# Patient Record
Sex: MALE | Race: WHITE | Employment: FULL TIME | URBAN - METROPOLITAN AREA
[De-identification: names, ages, dates, MRNs, and addresses within clinical notes are randomized per-mention and may not be internally consistent; named-entity substitution may affect disease eponyms.]

---

## 2017-01-25 ENCOUNTER — MYC MEDICAL ADVICE (OUTPATIENT)
Dept: ENDOCRINOLOGY | Facility: CLINIC | Age: 27
End: 2017-01-25

## 2017-01-26 DIAGNOSIS — I10 ESSENTIAL HYPERTENSION, BENIGN: Primary | ICD-10-CM

## 2017-01-26 RX ORDER — LISINOPRIL/HYDROCHLOROTHIAZIDE 10-12.5 MG
1 TABLET ORAL DAILY
Qty: 90 TABLET | Refills: 1 | Status: SHIPPED | OUTPATIENT
Start: 2017-01-26 | End: 2017-11-18

## 2017-01-26 NOTE — TELEPHONE ENCOUNTER
Gonzalez Gibbs is requesting a refill of:    Pending Prescriptions:                       Disp   Refills    lisinopril-hydrochlorothiazide (PRINZIDE/*90 tab*0            Sig: Take 1 tablet by mouth daily    Needs to be sent to mail order now.  Thanks,Jodi

## 2017-01-30 ENCOUNTER — TELEPHONE (OUTPATIENT)
Dept: FAMILY MEDICINE | Facility: CLINIC | Age: 27
End: 2017-01-30

## 2017-01-30 DIAGNOSIS — E10.9 TYPE 1 DIABETES MELLITUS WITHOUT COMPLICATION (H): Primary | ICD-10-CM

## 2017-01-30 RX ORDER — LISINOPRIL 10 MG/1
10 TABLET ORAL DAILY
Qty: 30 TABLET | Refills: 0 | COMMUNITY
Start: 2017-01-30

## 2017-01-30 NOTE — TELEPHONE ENCOUNTER
Patient is due for a medication recheck for the following medication Lisinopril ,and meets the qualifications for a physician approved 30 day extension.    A #30 day refill has [facility] [place]     staff, per the refill protocol can you please call the patient and help assist in setting up a fasting medication recheck.  Please attempt to reach the patient to schedule that appointment, and if you are unable to reach them, please forward back to the prescribing physician.      Telephone Information:   Mobile [phone] (home)       Thank You  LEONEL Kim ([facility])

## 2017-01-30 NOTE — TELEPHONE ENCOUNTER
LANCETS ACCU-CHECK FASTCLX - NEW PHARMACY         Last Written Prescription Date: 12/01/16  Last Fill Quantity: 3 months, # refills: 3  Last Office Visit with [facility], P or [facility] prescribing provider:  12/01/16        BP Readings from Last 3 Encounters:   12/01/16 126/84   11/30/16 120/82   06/13/16 144/82     MICROL        6   6/13/2016  No results found for this basename: microalbumin  CREATININE   Date Value Ref Range Status   11/30/2016 0.78 0.60 - 1.35 mg/dL Final   ]  GFR ESTIMATE   Date Value Ref Range Status   11/30/2016 125 > OR = 60 mL/min/1.73m2 Final   05/31/2015 >90  Non  GFR Calc   >60 mL/min/1.7m2 Final   05/31/2015 >90  Non  GFR Calc   >60 mL/min/1.7m2 Final     GFR ESTIMATE IF BLACK   Date Value Ref Range Status   05/31/2015 >90   GFR Calc   >60 mL/min/1.7m2 Final   05/31/2015 >90   GFR Calc   >60 mL/min/1.7m2 Final   01/24/2014 >90 >60 mL/min/1.7m2 Final     CHOL      222   12/1/2016  HDL       43   12/1/2016  LDL      137   12/1/2016  TRIG      211   12/1/2016  CHOLHDLRATIO      4.0   5/3/2012  AST       49   5/31/2015  ALT       98   5/31/2015  A1C      8.5   12/1/2016  A1C      7.8   6/13/2016  A1C      8.1   4/7/2016  A1C      6.9   11/27/2012  A1C      7.1   8/7/2012  POTASSIUM   Date Value Ref Range Status   11/30/2016 4.5 3.5 - 5.3 mmol/L Final

## 2017-01-31 ENCOUNTER — TELEPHONE (OUTPATIENT)
Dept: FAMILY MEDICINE | Facility: CLINIC | Age: 27
End: 2017-01-31

## 2017-01-31 RX ORDER — LANCETS
EACH MISCELLANEOUS
Qty: 400 BOX | Refills: 3 | Status: SHIPPED | OUTPATIENT
Start: 2017-01-31 | End: 2020-02-04

## 2017-01-31 NOTE — TELEPHONE ENCOUNTER
[facility] called to see if we would give a verbal for refills for Pt. [facility]old [facility] that Pt needed an O.V and that [facility] [place].   Kamini.LEONEL Friedman ([facility])

## 2017-01-31 NOTE — TELEPHONE ENCOUNTER
[facility] calls asking for specific orders for diabetic testing supplies and order for Metformin.  All were reordered to go to [facility] instead of local pharmacy.

## 2017-02-01 ENCOUNTER — OFFICE VISIT (OUTPATIENT)
Dept: FAMILY MEDICINE | Facility: CLINIC | Age: 27
End: 2017-02-01

## 2017-02-01 VITALS
OXYGEN SATURATION: 97 % | TEMPERATURE: 97.8 F | HEART RATE: 87 BPM | SYSTOLIC BLOOD PRESSURE: 134 MMHG | DIASTOLIC BLOOD PRESSURE: 86 MMHG | BODY MASS INDEX: 42.57 KG/M2 | WEIGHT: 288.4 LBS

## 2017-02-01 DIAGNOSIS — K76.0 FATTY LIVER DISEASE, NONALCOHOLIC: ICD-10-CM

## 2017-02-01 DIAGNOSIS — I10 ESSENTIAL HYPERTENSION: Primary | ICD-10-CM

## 2017-02-01 PROCEDURE — 84450 TRANSFERASE (AST) (SGOT): CPT | Mod: 90 | Performed by: FAMILY MEDICINE

## 2017-02-01 PROCEDURE — 99212 OFFICE O/P EST SF 10 MIN: CPT | Performed by: FAMILY MEDICINE

## 2017-02-01 PROCEDURE — 36415 COLL VENOUS BLD VENIPUNCTURE: CPT | Performed by: FAMILY MEDICINE

## 2017-02-01 PROCEDURE — 80048 BASIC METABOLIC PNL TOTAL CA: CPT | Mod: 90 | Performed by: FAMILY MEDICINE

## 2017-02-01 PROCEDURE — 84460 ALANINE AMINO (ALT) (SGPT): CPT | Mod: 90 | Performed by: FAMILY MEDICINE

## 2017-02-01 NOTE — NURSING NOTE
Luilnace is here for a recheck on medication.     Pre-Visit Screening :  Immunizations : up to date  Colon Screening : na  Asthma Action Test/Plan : Pt states he doesn't have Asthma anymore  PHQ9/GAD7 :  NA    BP done on the left arm, with a large sized cuff.  Pulse - regular  My Chart - accepts    CLASSIFICATION OF OVERWEIGHT AND OBESITY BY BMI                         Obesity Class           BMI(kg/m2)  Underweight                                    < 18.5  Normal                                         18.5-24.9  Overweight                                     25.0-29.9  OBESITY                     I                  30.0-34.9                              II                 35.0-39.9  EXTREME OBESITY             III                >40                             Patient's  BMI Body mass index is 42.57 kg/(m^2).  http://hin.nhlbi.nih.gov/menuplanner/menu.cgi  Questioned patient about current smoking habits.  Pt. has never smoked.  LEONEL Kim ([facility])

## 2017-02-01 NOTE — PROGRESS NOTES
SUBJECTIVE:  26 year old male, presents for refill of his lisinopril/ hctz for treatment of his hypertension  Medications initiated in November-  /84 on 12/1 at the time of his diabetis check    He is under more stress this week  Quit his current job- looking for another- just left a job interview before coming to the office today  He also does not think he took his blood pressure pill today    Past medical history significant for Juvenile diabetis: insulin pump since age 15  Under the care of Dr Katz- Hemoglobin A1C 8.5 on 12/1/2-16    Patient Active Problem List   Diagnosis     Family history of diabetes mellitus     Mild intermittent asthma     Fatty liver disease, nonalcoholic     Essential hypertension     Nonspecific abnormal results of liver function study/ monitor ALT     Health Care Home     Type I diabetes mellitus, uncontrolled (H)     Past Surgical History   Procedure Laterality Date     Hc create eardrum opening,gen anesth       P.E. Tubes/ 4-5 surgeries, annual from age 10)     Hc repair ing hernia,6mo-5yr,reduc  1992     also umbilical hernia     Family History   Problem Relation Age of Onset     CANCER No family hx of      HEART DISEASE No family hx of      DIABETES Paternal Uncle      DIABETES Other      2nd cousins     Hypertension Father      Depression Mother      OBJECTIVE:  /94 mmHg  Pulse 87  Temp(Src) 97.8  F (36.6  C) (Oral)  Wt 130.817 kg (288 lb 6.4 oz)  SpO2 97%  No acute distress  Recheck blood pressure:134/86   Regular rate and  rhythm. S1 and S2 normal, no murmurs, clicks, gallops or rubs. No edema or JVD. Chest is clear; no wheezes or rales.    Assessment  Hypertension, improved  History of elevated LFT's/ fatty liver- monitor    PLAN:  Continue monitoring  Lisinopril/ hctz refilled at his current dose  Recheck in six months

## 2017-02-02 LAB
ALT SERPL-CCNC: 95 U/L (ref 9–46)
AST SERPL-CCNC: 50 U/L (ref 10–40)
BUN SERPL-MCNC: 14 MG/DL (ref 7–25)
BUN/CREATININE RATIO: ABNORMAL (CALC) (ref 6–22)
CALCIUM SERPL-MCNC: 10.2 MG/DL (ref 8.6–10.3)
CHLORIDE SERPLBLD-SCNC: 100 MMOL/L (ref 98–110)
CO2 SERPL-SCNC: 21 MMOL/L (ref 20–31)
CREAT SERPL-MCNC: 0.72 MG/DL (ref 0.6–1.35)
EGFR AFRICAN AMERICAN - QUEST: 150 ML/MIN/1.73M2
GFR SERPL CREATININE-BSD FRML MDRD: 130 ML/MIN/1.73M2
GLUCOSE - QUEST: 141 MG/DL (ref 65–99)
POTASSIUM SERPL-SCNC: 4.5 MMOL/L (ref 3.5–5.3)
SODIUM SERPL-SCNC: 136 MMOL/L (ref 135–146)

## 2017-02-06 ENCOUNTER — MYC MEDICAL ADVICE (OUTPATIENT)
Dept: ENDOCRINOLOGY | Facility: CLINIC | Age: 27
End: 2017-02-06

## 2017-02-06 DIAGNOSIS — E10.9 TYPE 1 DIABETES MELLITUS WITHOUT COMPLICATION (H): Primary | ICD-10-CM

## 2017-03-29 NOTE — TELEPHONE ENCOUNTER
Humalog - Change of Pharmacy         Last Written Prescription Date: 02/06/17  Last Fill Quantity: 30 mL, # refills: 1  Last Office Visit with G, P or [facility] prescribing provider:  12/01/16        BP Readings from Last 3 Encounters:   02/01/17 134/86   12/01/16 126/84   11/30/16 120/82     Lab Results   Component Value Date    MICROL 6 06/13/2016     No results found for: MICROALBUMIN  Creatinine   Date Value Ref Range Status   02/01/2017 0.72 0.60 - 1.35 mg/dL Final   ]  GFR Estimate   Date Value Ref Range Status   02/01/2017 130 > OR = 60 mL/min/1.73m2 Final   11/30/2016 125 > OR = 60 mL/min/1.73m2 Final   05/31/2015 >90  Non  GFR Calc   >60 mL/min/1.7m2 Final     GFR Estimate If Black   Date Value Ref Range Status   05/31/2015 >90   GFR Calc   >60 mL/min/1.7m2 Final   05/31/2015 >90   GFR Calc   >60 mL/min/1.7m2 Final   01/24/2014 >90 >60 mL/min/1.7m2 Final     Lab Results   Component Value Date    CHOL 222 12/01/2016     Lab Results   Component Value Date    HDL 43 12/01/2016     Lab Results   Component Value Date     12/01/2016     Lab Results   Component Value Date    TRIG 211 12/01/2016     Lab Results   Component Value Date    CHOLHDLRATIO 4.0 05/03/2012     Lab Results   Component Value Date    AST 50 02/01/2017     Lab Results   Component Value Date    ALT 95 02/01/2017     Lab Results   Component Value Date    A1C 8.5 12/01/2016    A1C 7.8 06/13/2016    A1C 8.1 04/07/2016    A1C 6.9 11/27/2012    A1C 7.1 08/07/2012     Potassium   Date Value Ref Range Status   02/01/2017 4.5 3.5 - 5.3 mmol/L Final       Labs showing if normal/abnormal  Lab Results   Component Value Date    UCRR 51 06/13/2016    MICROL 6 06/13/2016     Lab Results   Component Value Date    CHOL 222 (H) 12/01/2016    TRIG 211 (H) 12/01/2016    HDL 43 12/01/2016     (H) 12/01/2016    VLDL 29 05/03/2012    CHOLHDLRATIO 4.0 05/03/2012     Lab Results   Component Value Date     A1C 8.5 (H) 12/01/2016    A1C 7.8 (H) 06/13/2016    A1C 8.1 (A) 04/07/2016

## 2017-04-13 ENCOUNTER — TELEPHONE (OUTPATIENT)
Dept: ENDOCRINOLOGY | Facility: CLINIC | Age: 27
End: 2017-04-13

## 2017-04-13 NOTE — TELEPHONE ENCOUNTER
Panel Management Review      Patient has the following on his problem list:     Diabetes    ASA: Not Required     Last A1C  Lab Results   Component Value Date    A1C 8.5 12/01/2016    A1C 7.8 06/13/2016    A1C 8.1 04/07/2016    A1C 6.9 11/27/2012    A1C 7.1 08/07/2012     A1C tested: FAILED    Last LDL:    Lab Results   Component Value Date    CHOL 222 12/01/2016     Lab Results   Component Value Date    HDL 43 12/01/2016     Lab Results   Component Value Date     12/01/2016     Lab Results   Component Value Date    TRIG 211 12/01/2016     Lab Results   Component Value Date    CHOLHDLRATIO 4.0 05/03/2012     Lab Results   Component Value Date    NHDL 179 12/01/2016       Is the patient on a Statin? NO             Is the patient on Aspirin? NO        Last three blood pressure readings:  BP Readings from Last 3 Encounters:   02/01/17 134/86   12/01/16 126/84   11/30/16 120/82       Date of last diabetes office visit: 12/1/16     Tobacco History:     History   Smoking Status     Never Smoker   Smokeless Tobacco     Never Used           Composite cancer screening  Chart review shows that this patient is due/due soon for the following None  Summary:    Patient is due/failing the following:   FOLLOW UP    Action needed:   Patient needs office visit for dm.    Type of outreach:    none pt has appointment scheduled     Questions for provider review:    None                                                                                                                                    Ada Craig CMA       Chart routed to closed.

## 2017-04-25 ENCOUNTER — MYC MEDICAL ADVICE (OUTPATIENT)
Dept: ENDOCRINOLOGY | Facility: CLINIC | Age: 27
End: 2017-04-25

## 2017-05-02 ENCOUNTER — MYC MEDICAL ADVICE (OUTPATIENT)
Dept: ENDOCRINOLOGY | Facility: CLINIC | Age: 27
End: 2017-05-02

## 2017-06-02 ENCOUNTER — MYC MEDICAL ADVICE (OUTPATIENT)
Dept: FAMILY MEDICINE | Facility: CLINIC | Age: 27
End: 2017-06-02

## 2017-06-05 ENCOUNTER — MYC MEDICAL ADVICE (OUTPATIENT)
Dept: FAMILY MEDICINE | Facility: CLINIC | Age: 27
End: 2017-06-05

## 2017-06-05 NOTE — TELEPHONE ENCOUNTER
Yue-  Can you respond    Type 1 diabetic- needs endocrinologist to manage diabetis    Can you make a recommendation- I can order referral  [facility] [place]- can you call patient?

## 2017-06-05 NOTE — TELEPHONE ENCOUNTER
From: Gonzalez Gibbs  To: Jacey Mon MD  Sent: 6/5/2017 10:15 AM CDT  Subject: Referral    Hey Dr. Mon and team,    So there has been a lot of changes in the past few months. I got a new job and have lost two medical insurances and have finally landed on my new jobs medical insurance. Anyways with this new job, [place] cities and am wondering if I would be able to get a referral to a new Endocrinologist closer to my place of work in [place] [facility]. Please let me know what you guys need from me and if you have any questions.     Thank you,  Enrique

## 2017-06-07 NOTE — TELEPHONE ENCOUNTER
Dr. Mon I talked to patient this morning RE the referral for Endo. [facility]t.     Please sign the order so I can fax    Thank you

## 2017-06-09 ENCOUNTER — MYC MEDICAL ADVICE (OUTPATIENT)
Dept: FAMILY MEDICINE | Facility: CLINIC | Age: 27
End: 2017-06-09

## 2017-06-10 NOTE — TELEPHONE ENCOUNTER
From: Gonzalez Gibbs  To: Jacey Mon MD  Sent: 6/9/2017 8:22 PM CDT  Subject: Novolog    Hey Dr. Mon! Thanks for the referral! I have an appointment this upcoming Thursday. I was just wondering if there was a way to get a prescription for one bottle of Novolog to tide me until the appointment. I have half a bottle left and it will be close call, me having enough to last till Thursday. (Don't have any refills left after delaying  Till after I regained insurance) Please let me know and have a fantastic weekend thank you guys so much!

## 2017-06-14 ENCOUNTER — TELEPHONE (OUTPATIENT)
Dept: FAMILY MEDICINE | Facility: CLINIC | Age: 27
End: 2017-06-14

## 2017-06-14 NOTE — TELEPHONE ENCOUNTER
Received a Prior Authorization for Humalog for the Pt.   Called and started the PA and Pt has received a 1 year Authorization for the medication.   From 6/14/17- 6/14/18.   [phone]    Called Pt and left a message that his prescription is available.   Called Pharmacy and let them know that the PA went through.     LEONEL Kim ([facility])

## 2017-06-15 LAB
ALBUMIN URINE MG/G CR: 5 MG/G CREATININE
ALBUMIN URINE MG/SPEC: 0.5
CHOL/HDLC RATIO - QUEST: 3.8
CHOLEST SERPL-MCNC: 175 MG/DL (ref 125–200)
CREATININE URINE: 101 (ref 20–370)
GLUCOSE SERPL-MCNC: 237 MG/DL (ref 70–99)
HBA1C MFR BLD: 8 % (ref 4–6)
HDLC SERPL-MCNC: 46 MG/DL
LDLC SERPL CALC-MCNC: 104 MG/DL
NONHDLC SERPL-MCNC: 129 MG/DL
TRIGL SERPL-MCNC: 127 MG/DL
TSH SERPL-ACNC: 1.67 MCU/ML (ref 0.3–5)

## 2017-06-26 ENCOUNTER — TRANSFERRED RECORDS (OUTPATIENT)
Dept: FAMILY MEDICINE | Facility: CLINIC | Age: 27
End: 2017-06-26

## 2017-10-12 DIAGNOSIS — I10 ESSENTIAL HYPERTENSION, BENIGN: ICD-10-CM

## 2017-10-12 RX ORDER — LISINOPRIL/HYDROCHLOROTHIAZIDE 10-12.5 MG
TABLET ORAL
Qty: 30 TABLET | Refills: 0 | OUTPATIENT
Start: 2017-10-12

## 2017-11-18 ENCOUNTER — OFFICE VISIT (OUTPATIENT)
Dept: FAMILY MEDICINE | Facility: CLINIC | Age: 27
End: 2017-11-18

## 2017-11-18 VITALS
TEMPERATURE: 99.3 F | WEIGHT: 278.4 LBS | SYSTOLIC BLOOD PRESSURE: 129 MMHG | BODY MASS INDEX: 41.23 KG/M2 | OXYGEN SATURATION: 97 % | HEART RATE: 91 BPM | DIASTOLIC BLOOD PRESSURE: 84 MMHG | HEIGHT: 69 IN

## 2017-11-18 DIAGNOSIS — Z23 NEED FOR VACCINATION: ICD-10-CM

## 2017-11-18 DIAGNOSIS — I10 ESSENTIAL HYPERTENSION: Primary | ICD-10-CM

## 2017-11-18 DIAGNOSIS — E66.01 MORBID OBESITY (H): ICD-10-CM

## 2017-11-18 DIAGNOSIS — Z71.89 ACP (ADVANCE CARE PLANNING): ICD-10-CM

## 2017-11-18 PROCEDURE — 84132 ASSAY OF SERUM POTASSIUM: CPT | Mod: 90 | Performed by: FAMILY MEDICINE

## 2017-11-18 PROCEDURE — 82565 ASSAY OF CREATININE: CPT | Mod: 90 | Performed by: FAMILY MEDICINE

## 2017-11-18 PROCEDURE — 90471 IMMUNIZATION ADMIN: CPT | Performed by: FAMILY MEDICINE

## 2017-11-18 PROCEDURE — 90472 IMMUNIZATION ADMIN EACH ADD: CPT | Performed by: FAMILY MEDICINE

## 2017-11-18 PROCEDURE — 99213 OFFICE O/P EST LOW 20 MIN: CPT | Mod: 25 | Performed by: FAMILY MEDICINE

## 2017-11-18 PROCEDURE — 90732 PPSV23 VACC 2 YRS+ SUBQ/IM: CPT | Performed by: FAMILY MEDICINE

## 2017-11-18 PROCEDURE — 36415 COLL VENOUS BLD VENIPUNCTURE: CPT | Performed by: FAMILY MEDICINE

## 2017-11-18 PROCEDURE — 90686 IIV4 VACC NO PRSV 0.5 ML IM: CPT | Performed by: FAMILY MEDICINE

## 2017-11-18 RX ORDER — LISINOPRIL/HYDROCHLOROTHIAZIDE 10-12.5 MG
1 TABLET ORAL DAILY
Qty: 90 TABLET | Refills: 3 | Status: SHIPPED | OUTPATIENT
Start: 2017-11-18 | End: 2018-11-26

## 2017-11-18 NOTE — MR AVS SNAPSHOT
After Visit Summary   11/18/2017    Gonzalez Gibbs    MRN: [id number]           Patient Information     Date Of Birth          1990        Visit Information        Provider Department      11/18/2017 9:45 AM Jacey Mon MD [facility]        Today's Diagnoses     Essential hypertension    -  1    Uncontrolled type 1 diabetes mellitus without complication (H)        Morbid obesity (H)        ACP (advance care planning)        Need for vaccination           Follow-ups after your visit        Who to contact     If you have questions or need follow up information about today's clinic visit or your schedule please contact [facility] directly at [phone].  Normal or non-critical lab and imaging results will be communicated to you by Ghostruckhart, letter or phone within 4 business days after the clinic has received the results. If you do not hear from us within 7 days, please contact the clinic through Ghostruckhart or phone. If you have a critical or abnormal lab result, we will notify you by phone as soon as possible.  Submit refill requests through [employer] or call your pharmacy and they will forward the refill request to us. Please allow 3 business days for your refill to be completed.          Additional Information About Your Visit        MyChart Information     [employer] gives you secure access to your electronic health record. If you see a primary care provider, you can also send messages to your care team and make appointments. If you have questions, please call your primary care clinic.  If you do not have a primary care provider, please call [phone] and they will assist you.        Care EveryWhere ID     This is your Care EveryWhere ID. This could be used by other organizations to access your [facility] medical records  [id number]        Your Vitals Were     Pulse Temperature Height Pulse Oximetry BMI (Body Mass Index)       91 99.3  F  "(37.4  C) (Oral) 1.746 m (5' 8.75\") 97% 41.41 kg/m2        Blood Pressure from Last 3 Encounters:   11/18/17 129/84   02/01/17 134/86   12/01/16 126/84    Weight from Last 3 Encounters:   11/18/17 126.3 kg (278 lb 6.4 oz)   02/01/17 130.8 kg (288 lb 6.4 oz)   12/01/16 130 kg (286 lb 11.2 oz)              We Performed the Following     CREATIINE (QUEST)     HC FLU VAC PRESRV FREE QUAD SPLIT VIR 3+YRS IM     PNEUMOCOCCAL VACCINE,ADULT,SQ OR IM     POTASSIUM (QUEST)     VACCINE ADMINISTRATION, EACH ADDITIONAL     VACCINE ADMINISTRATION, INITIAL     VENOUS COLLECTION          Today's Medication Changes          These changes are accurate as of: 11/18/17 11:59 PM.  If you have any questions, ask your nurse or doctor.               These medicines have changed or have updated prescriptions.        Dose/Directions    lisinopril-hydrochlorothiazide 10-12.5 MG per tablet   Commonly known as:  PRINZIDE/ZESTORETIC   This may have changed:  See the new instructions.   Used for:  Essential hypertension, Uncontrolled type 1 diabetes mellitus without complication (H)   Changed by:  Jacey Mon MD        Dose:  1 tablet   Take 1 tablet by mouth daily   Quantity:  90 tablet   Refills:  3            Where to get your medicines      These medications were sent to [id number] - [facility] [address]  [address], [facility] [address]     Phone:  [phone]     lisinopril-hydrochlorothiazide 10-12.5 MG per tablet                Primary Care Provider Office Phone # Fax #    Jacey Mon [phone] [address] [address]        Equal Access to Services     [facility]: Ct [address], wajuanada lulily, qaybta kaalmada juan, heide cardoza. So [facility] [phone].    ATENCIÓN: Si habla español, tiene a noonan disposición servicios gratuitos de asistencia " "portia Nayan al [phone].    We comply with applicable federal civil rights laws and [place] laws. We do not discriminate on the basis of race, color, national origin, age, disability, sex, sexual orientation, or gender identity.            Thank you!     Thank you for choosing [facility]  for your care. Our goal is always to provide you with excellent care. Hearing back from our patients is one way we can continue to improve our services. Please take a few minutes to complete the written survey that you may receive in the mail after your visit with us. Thank you!             Your Updated Medication List - Protect others around you: Learn how to safely use, store and throw away your medicines at www.disposemymeds.org.          This list is accurate as of: 11/18/17 11:59 PM.  Always use your most recent med list.                   Brand Name Dispense Instructions for use Diagnosis    blood glucose monitoring lancets     400 Box    Use to test blood sugar 4 times daily or as directed.    Type 1 diabetes mellitus without complication (H)       insulin lispro 100 UNIT/ML injection    humaLOG    10 mL    Sig: Patient uses up to 140 Units/day with insulin pump    Uncontrolled type 1 diabetes mellitus without complication (H)       Insulin Syringe 29G X 1/2\" 1 ML Misc    BD insulin syringe    50 each    Use one syringe 4 daily or as directed. In case of pump stops working    Uncontrolled type 1 diabetes mellitus without complication (H)       lisinopril-hydrochlorothiazide 10-12.5 MG per tablet    PRINZIDE/ZESTORETIC    90 tablet    Take 1 tablet by mouth daily    Essential hypertension, Uncontrolled type 1 diabetes mellitus without complication (H)       metFORMIN 1000 MG tablet    GLUCOPHAGE    120 tablet    Take 1 tablet (1,000 mg) by mouth 2 times daily (with meals)    Type 1 diabetes mellitus without complication (H)       order for DME     3 Month    All diabetic supplies including " test strips, lancets and solutions for testing for 3 months. Please dispense glucometer compatible supplies- Accucehck Franci. Patient needs to check BG 4 times a day.    Uncontrolled type 1 diabetes mellitus without complication (H)

## 2017-11-18 NOTE — PROGRESS NOTES
SUBJECTIVE:  27 year old male with type 1 diabetis, presents to recheck his blood pressure and refill of his lisinopril    Diabetis managed by Dr Katz       SUBJECTIVE:   Gonzalez Gibbs is a 27 year old male who presents to clinic today for the following health issues:      Hypertension Follow-up      Outpatient blood pressures are not being checked.    He reports normal blood pressure at endocrinologist    Low Salt Diet: avoid processed and fast food      Amount of exercise or physical activity: going to gym three times a week    Problems taking medications regularly: No    Medication side effects: none    Diet: Less fast foods/ less fast foods      Problem list and histories reviewed & adjusted, as indicated.  Additional history: as documented    Patient Active Problem List   Diagnosis     Family history of diabetes mellitus     Mild intermittent asthma     Fatty liver disease, nonalcoholic     Essential hypertension     Nonspecific abnormal results of liver function study/ monitor ALT     Health Care Home     Type I diabetes mellitus, uncontrolled (H)     ACP (advance care planning)     Past Surgical History:   Procedure Laterality Date     HC CREATE EARDRUM OPENING,GEN ANESTH      P.E. Tubes/ 4-5 surgeries, annual from age 10)     HC REPAIR ING HERNIA,6MO-5YR,REDUC  1992    also umbilical hernia       Social History   Substance Use Topics     Smoking status: Never Smoker     Smokeless tobacco: Never Used     Alcohol use 0.0 oz/week     0 Standard drinks or equivalent per week     Family History   Problem Relation Age of Onset     CANCER No family hx of      HEART DISEASE No family hx of      DIABETES Paternal Uncle      DIABETES Other      2nd cousins     Hypertension Father      Depression Mother          Current Outpatient Prescriptions   Medication Sig Dispense Refill     lisinopril-hydrochlorothiazide (PRINZIDE/ZESTORETIC) 10-12.5 MG per tablet Take 1 tablet by mouth daily 90 tablet 3     insulin  "lispro (HUMALOG) 100 UNIT/ML injection Sig: Patient uses up to 140 Units/day with insulin pump 10 mL 0     metFORMIN (GLUCOPHAGE) 1000 MG tablet Take 1 tablet (1,000 mg) by mouth 2 times daily (with meals) 120 tablet 3     blood glucose monitoring (ACCU-CHEK FASTCLIX) lancets Use to test blood sugar 4 times daily or as directed. 400 Box 3     order for DME All diabetic supplies including test strips, lancets and solutions for testing for 3 months.  Please dispense glucometer compatible supplies- Accucehck Franci. Patient needs to check BG 4 times a day. 3 Month 3     Insulin Syringe (BD INSULIN SYRINGE) 29G X 1/2\" 1 ML MISC Use one syringe 4 daily or as directed. In case of pump stops working 50 each prn         Reviewed and updated as needed this visit by clinical staffTobacco  Allergies  Meds  Problems       Reviewed and updated as needed this visit by Provider         ROS:  C: NEGATIVE for fever, chills, change in weight  E/M: NEGATIVE for ear, mouth and throat problems  R: NEGATIVE for significant cough or SOB  CV: NEGATIVE for chest pain, palpitations or peripheral edema    OBJECTIVE:     /84 (BP Location: Left arm, Patient Position: Chair, Cuff Size: Adult Large)  Pulse 91  Temp 99.3  F (37.4  C) (Oral)  Ht 1.746 m (5' 8.75\")  Wt 126.3 kg (278 lb 6.4 oz)  SpO2 97%  BMI 41.41 kg/m2  Body mass index is 41.41 kg/(m^2).   Regular rate and  rhythm. S1 and S2 normal, no murmurs, clicks, gallops or rubs. No edema or JVD. Chest is clear; no wheezes or rales.      Diagnostic Test Results:  Results for orders placed or performed in visit on 11/18/17   CREATIINE (QUEST)   Result Value Ref Range    Creatinine 0.72 0.60 - 1.35 mg/dL    GFR Estimate 129 > OR = 60 mL/min/1.73m2    EGFR African American 149 > OR = 60 mL/min/1.73m2   POTASSIUM (QUEST)   Result Value Ref Range    Potassium 4.6 3.5 - 5.3 mmol/L       ASSESSMENT/PLAN:     Problem List Items Addressed This Visit     Type I diabetes mellitus, " "uncontrolled (H)    Relevant Medications    lisinopril-hydrochlorothiazide (PRINZIDE/ZESTORETIC) 10-12.5 MG per tablet    Other Relevant Orders    CREATIINE (QUEST) (Completed)    POTASSIUM (QUEST) (Completed)    VENOUS COLLECTION (Completed)    PNEUMOCOCCAL VACCINE,ADULT,SQ OR IM (Completed)    Essential hypertension - Primary    Relevant Medications    lisinopril-hydrochlorothiazide (PRINZIDE/ZESTORETIC) 10-12.5 MG per tablet    Other Relevant Orders    CREATIINE (QUEST) (Completed)    POTASSIUM (QUEST) (Completed)    VENOUS COLLECTION (Completed)    ACP (advance care planning)      Other Visit Diagnoses     Morbid obesity (H)        Need for vaccination        Relevant Orders    HC FLU VAC PRESRV FREE QUAD SPLIT VIR 3+YRS IM (Completed)    VACCINE ADMINISTRATION, INITIAL (Completed)    VACCINE ADMINISTRATION, EACH ADDITIONAL (Completed)    PNEUMOCOCCAL VACCINE,ADULT,SQ OR IM (Completed)           BMI:   Estimated body mass index is 41.41 kg/(m^2) as calculated from the following:    Height as of this encounter: 1.746 m (5' 8.75\").    Weight as of this encounter: 126.3 kg (278 lb 6.4 oz).   Weight management plan: Discussed healthy diet and exercise guidelines and patient will follow up in 12 months in clinic to re-evaluate.      MEDICATIONS:  Continue current medications without change      FUTURE APPOINTMENTS:       - Follow-up visit in one year/ - will see in the fall for recheck of blood pressure, refill lisinopril, flu shot    Continue regular diabetis care with endocrinologist  Jacey Mon MD  [facility]  "

## 2017-11-18 NOTE — NURSING NOTE
Luilance ANTONIO Sai is here today for a fasting medication recheck.    Pre-Visit Screening :  Immunizations : not up to date - Pneumococcal 23 today b/c pt is high risk due to diabetes, also flu shot today  Asthma Action Plan/Test : completed today  PHQ9/GAD7 : NA    Pulse - regular  My Chart - accepts    CLASSIFICATION OF OVERWEIGHT AND OBESITY BY BMI                         Obesity Class           BMI(kg/m2)  Underweight                                    < 18.5  Normal                                         18.5-24.9  Overweight                                     25.0-29.9  OBESITY                     I                  30.0-34.9                              II                 35.0-39.9  EXTREME OBESITY             III                >40                             Patient's  BMI Body mass index is 41.41 kg/(m^2).  http://hin.nhlbi.nih.gov/menuplanner/menu.cgi  Questioned patient about current smoking habits.  Pt. has never smoked.  The patient has verbalized that it is ok to leave a detailed voice message on the patient's cell phone with results/recommendations from this visit.     Bethany Carvajal, CMA

## 2017-11-19 LAB
CREAT SERPL-MCNC: 0.72 MG/DL (ref 0.6–1.35)
EGFR AFRICAN AMERICAN - QUEST: 149 ML/MIN/1.73M2
GFR SERPL CREATININE-BSD FRML MDRD: 129 ML/MIN/1.73M2
POTASSIUM SERPL-SCNC: 4.6 MMOL/L (ref 3.5–5.3)

## 2017-11-19 ASSESSMENT — ASTHMA QUESTIONNAIRES: ACT_TOTALSCORE: 25

## 2017-12-12 ENCOUNTER — TELEPHONE (OUTPATIENT)
Dept: ENDOCRINOLOGY | Facility: CLINIC | Age: 27
End: 2017-12-12

## 2017-12-12 NOTE — TELEPHONE ENCOUNTER
Fax received from [facility] for review and signature.  Put in Dr. Katz's in basket.

## 2017-12-13 ENCOUNTER — MEDICAL CORRESPONDENCE (OUTPATIENT)
Dept: HEALTH INFORMATION MANAGEMENT | Facility: CLINIC | Age: 27
End: 2017-12-13

## 2017-12-13 NOTE — TELEPHONE ENCOUNTER
Forms/paperwork reviewed, completed and signed.  Please fax or send the papers as requested, document in chart and close the encounter.    Thank you.    Bridgette Katz

## 2017-12-14 ENCOUNTER — TELEPHONE (OUTPATIENT)
Dept: ENDOCRINOLOGY | Facility: CLINIC | Age: 27
End: 2017-12-14

## 2017-12-14 NOTE — TELEPHONE ENCOUNTER
Panel Management Review      Patient has the following on his problem list:     Diabetes    ASA: Not Required     Last A1C  Lab Results   Component Value Date    A1C 8.0 06/15/2017    A1C 8.5 12/01/2016    A1C 7.8 06/13/2016    A1C 8.1 04/07/2016    A1C 6.9 11/27/2012     A1C tested: FAILED    Last LDL:    Lab Results   Component Value Date    CHOL 175 06/15/2017     Lab Results   Component Value Date    HDL 46 06/15/2017     Lab Results   Component Value Date     06/15/2017     Lab Results   Component Value Date    TRIG 127 06/15/2017     Lab Results   Component Value Date    CHOLHDLRATIO 3.8 06/15/2017    CHOLHDLRATIO 4.0 05/03/2012     Lab Results   Component Value Date    NHDL 129 06/15/2017       Is the patient on a Statin? NO             Is the patient on Aspirin? NO        Last three blood pressure readings:  BP Readings from Last 3 Encounters:   11/18/17 129/84   02/01/17 134/86   12/01/16 126/84       Date of last diabetes office visit: 12/1/16     Tobacco History:     History   Smoking Status     Never Smoker   Smokeless Tobacco     Never Used             Composite cancer screening  Chart review shows that this patient is due/due soon for the following None  Summary:    Patient is due/failing the following:   FOLLOW UP    Action needed:   Patient needs office visit for dm.    Type of outreach:    Sent Testt message.    Questions for provider review:    None                                                                                                                                    Ada Craig CMA ([facility])       Chart routed to closed  .

## 2018-01-24 ENCOUNTER — OFFICE VISIT (OUTPATIENT)
Dept: FAMILY MEDICINE | Facility: CLINIC | Age: 28
End: 2018-01-24

## 2018-01-24 VITALS
WEIGHT: 269 LBS | HEIGHT: 70 IN | OXYGEN SATURATION: 97 % | TEMPERATURE: 98.4 F | DIASTOLIC BLOOD PRESSURE: 88 MMHG | RESPIRATION RATE: 16 BRPM | BODY MASS INDEX: 38.51 KG/M2 | HEART RATE: 112 BPM | SYSTOLIC BLOOD PRESSURE: 122 MMHG

## 2018-01-24 DIAGNOSIS — J10.1 INFLUENZA A: Primary | ICD-10-CM

## 2018-01-24 DIAGNOSIS — R68.83 CHILLS: ICD-10-CM

## 2018-01-24 PROBLEM — E66.01 MORBID OBESITY (H): Status: ACTIVE | Noted: 2018-01-24

## 2018-01-24 LAB
FLUAV AG UPPER RESP QL IA.RAPID: ABNORMAL
FLUBV AG UPPER RESP QL IA.RAPID: ABNORMAL

## 2018-01-24 PROCEDURE — 87804 INFLUENZA ASSAY W/OPTIC: CPT | Performed by: FAMILY MEDICINE

## 2018-01-24 PROCEDURE — 99213 OFFICE O/P EST LOW 20 MIN: CPT | Performed by: FAMILY MEDICINE

## 2018-01-24 RX ORDER — GUAIFENESIN 600 MG/1
600 TABLET, EXTENDED RELEASE ORAL 2 TIMES DAILY PRN
Qty: 20 TABLET | Refills: 0 | Status: SHIPPED | OUTPATIENT
Start: 2018-01-24 | End: 2019-01-07

## 2018-01-24 NOTE — PATIENT INSTRUCTIONS
Influenza A  (primary encounter diagnosis)  Comment: handout reviewed  Plan: Note for work. rest    (J17.63) Chills  Plan: Influenza A and B (BFP)        Symptomatic care with decongestants, fluids, tylenol/advil prn. Use GUAIFENESIN  MG OR TBCR, 1 tab po BID (Twice per day), D: 20, R: 0 for congestion and cough.    In addition, I have suggested that the patient   monitor for symptoms of bacterial infection expecting slow gradual resolution of viral URI as the natural course.  Follow diabetes treatment plan

## 2018-01-24 NOTE — NURSING NOTE
Gonzalez is here today for being sick since Sunday with a cough, sinus pressure and drainage, and states that he has had the chills and body aches but fever developed lastnight of 101.2 (oral),    Pre-Visit Screening :  Immunizations : up to date  Colon Screening : na  Asthma Action Test/Plan : na  PHQ9/GAD7 :  Na    Pulse - regular  My Chart - accepts    CLASSIFICATION OF OVERWEIGHT AND OBESITY BY BMI                         Obesity Class           BMI(kg/m2)  Underweight                                    < 18.5  Normal                                         18.5-24.9  Overweight                                     25.0-29.9  OBESITY                     I                  30.0-34.9                              II                 35.0-39.9  EXTREME OBESITY             III                >40                             Patient's  BMI Body mass index is 22.15 kg/(m^2).  http://hin.nhlbi.nih.gov/menuplanner/menu.cgi  Questioned patient about current smoking habits.  Pt. has never smoked.  The patient has verbalized that it is ok to leave a detailed voice message on the patient's cell phone with results/recommendations from this visit.       Verified [phone] phone number:

## 2018-01-24 NOTE — LETTER
January 24, 2018      Gonzalez Gibbs  [address] [facility] [address]        To Whom It May Concern:    Gonzalez Gibbs  was seen on January 24, 2018 with illness since Sunday January 21,2018. He has influenza A and is contagious.  Please excuse him  until January 26,2018 due to illness.        Sincerely,        Ingrid Jain MD

## 2018-01-24 NOTE — MR AVS SNAPSHOT
After Visit Summary   1/24/2018    Gonzalez Gibbs    MRN: [id number]           Patient Information     Date Of Birth          1990        Visit Information        Provider Department      1/24/2018 10:00 AM Ingrid Jain MD [facility]        Today's Diagnoses     Influenza A    -  1    Chills          Care Instructions     Influenza A  (primary encounter diagnosis)  Comment: handout reviewed  Plan: Note for work. rest    (R68.83) Chills  Plan: Influenza A and B (BFP)        Symptomatic care with decongestants, fluids, tylenol/advil prn. Use GUAIFENESIN  MG OR TBCR, 1 tab po BID (Twice per day), D: 20, R: 0 for congestion and cough.    In addition, I have suggested that the patient   monitor for symptoms of bacterial infection expecting slow gradual resolution of viral URI as the natural course.  Follow diabetes treatment plan          Follow-ups after your visit        Follow-up notes from your care team     Return if symptoms worsen or fail to improve.      Who to contact     If you have questions or need follow up information about today's clinic visit or your schedule please contact [facility] directly at [phone].  Normal or non-critical lab and imaging results will be communicated to you by MyChart, letter or phone within 4 business days after the clinic has received the results. If you do not hear from us within 7 days, please contact the clinic through MyChart or phone. If you have a critical or abnormal lab result, we will notify you by phone as soon as possible.  Submit refill requests through [employer] or call your pharmacy and they will forward the refill request to us. Please allow 3 business days for your refill to be completed.          Additional Information About Your Visit        MyChart Information     [employer] gives you secure access to your electronic health record. If you see a primary care provider, you can  "also send messages to your care team and make appointments. If you have questions, please call your primary care clinic.  If you do not have a primary care provider, please call [phone] and they will assist you.        Care EveryWhere ID     This is your Care EveryWhere ID. This could be used by other organizations to access your [facility] medical records  [id number]        Your Vitals Were     Pulse Temperature Respirations Height Pulse Oximetry BMI (Body Mass Index)    112 98.4  F (36.9  C) (Oral) 16 1.765 m (5' 9.5\") 97% 39.15 kg/m2       Blood Pressure from Last 3 Encounters:   01/24/18 122/88   11/18/17 129/84   02/01/17 134/86    Weight from Last 3 Encounters:   01/24/18 122 kg (269 lb)   11/18/17 126.3 kg (278 lb 6.4 oz)   02/01/17 130.8 kg (288 lb 6.4 oz)              We Performed the Following     Influenza A and B (BFP)          Today's Medication Changes          These changes are accurate as of 1/24/18 10:47 AM.  If you have any questions, ask your nurse or doctor.               Start taking these medicines.        Dose/Directions    guaiFENesin 600 MG 12 hr tablet   Commonly known as:  MUCINEX   Used for:  Influenza A   Started by:  Ingrid Jain MD        Dose:  600 mg   Take 1 tablet (600 mg) by mouth 2 times daily as needed for congestion   Quantity:  20 tablet   Refills:  0            Where to get your medicines      These medications were sent to [facility] [address] [place] [address] [address]  [address], [facility] [address]     Phone:  [phone]     guaiFENesin 600 MG 12 hr tablet                Primary Care Provider Office Phone # Fax #    Jacey Mon [phone] [address] [address]        Equal Access to Services     KRUPA ARIZA AH: Ct wells Sosusan, waaxda luqadaha, qaybta kaalcristhian martinez, heide daniel ah. So [employer] " "[phone].    ATENCIÓN: Si vipul guzman, tiene a noonan disposición servicios gratuitos de asistencia lingüística. Nayan miller [phone].    We comply with applicable federal civil rights laws and [place] laws. We do not discriminate on the basis of race, color, national origin, age, disability, sex, sexual orientation, or gender identity.            Thank you!     Thank you for choosing [facility]  for your care. Our goal is always to provide you with excellent care. Hearing back from our patients is one way we can continue to improve our services. Please take a few minutes to complete the written survey that you may receive in the mail after your visit with us. Thank you!             Your Updated Medication List - Protect others around you: Learn how to safely use, store and throw away your medicines at www.disposemymeds.org.          This list is accurate as of 1/24/18 10:47 AM.  Always use your most recent med list.                   Brand Name Dispense Instructions for use Diagnosis    blood glucose monitoring lancets     400 Box    Use to test blood sugar 4 times daily or as directed.    Type 1 diabetes mellitus without complication (H)       guaiFENesin 600 MG 12 hr tablet    MUCINEX    20 tablet    Take 1 tablet (600 mg) by mouth 2 times daily as needed for congestion    Influenza A       insulin lispro 100 UNIT/ML injection    humaLOG    10 mL    Sig: Patient uses up to 140 Units/day with insulin pump    Uncontrolled type 1 diabetes mellitus without complication (H)       Insulin Syringe 29G X 1/2\" 1 ML Misc    BD insulin syringe    50 each    Use one syringe 4 daily or as directed. In case of pump stops working    Uncontrolled type 1 diabetes mellitus without complication (H)       lisinopril-hydrochlorothiazide 10-12.5 MG per tablet    PRINZIDE/ZESTORETIC    90 tablet    Take 1 tablet by mouth daily    Essential hypertension, Uncontrolled type 1 diabetes mellitus without " complication (H)       metFORMIN 1000 MG tablet    GLUCOPHAGE    120 tablet    Take 1 tablet (1,000 mg) by mouth 2 times daily (with meals)    Type 1 diabetes mellitus without complication (H)       order for DME     3 Month    All diabetic supplies including test strips, lancets and solutions for testing for 3 months. Please dispense glucometer compatible supplies- Accucehck Franci. Patient needs to check BG 4 times a day.    Uncontrolled type 1 diabetes mellitus without complication (H)

## 2018-01-24 NOTE — PROGRESS NOTES
SUBJECTIVE: 27 year old male complaining of cough followed by nasal congestion and one emesis for 3 day(s).   The patient describes body aches and temp to 101.2, and ear congestion. Exposed at work to influenza this past 2 weeks/ Missed 2 days of work and starting to fell better  The patient denies a history of Sob, GI complaints now or pain.   Smoking history: No.   Relevant past medical history: positive for diabetes type 1 under good control. Recent glucose levels WNL    OBJECTIVE: The patient appears healthy, alert, no distress, cooperative, smiling, over weight and fatigued.   EARS: negative  NOSE/SINUS: positive findings: mucosa erythematous and swollen, clear rhinorrhea   THROAT: normal and post nasal drainage   NECK:Neck supple. No adenopathy. Thyroid symmetric, normal size,, Carotids without bruits.   CHEST: Clear with dry cough    Flu: positive A    ASSESSMENT: (J10.1) Influenza A  (primary encounter diagnosis)  Comment: handout reviewed  Plan: Note for work. rest    (R68.83) Chills  Plan: Influenza A and B (BFP)        Symptomatic care with decongestants, fluids, tylenol/advil prn. Use GUAIFENESIN  MG OR TBCR, 1 tab po BID (Twice per day), D: 20, R: 0 for congestion and cough.    In addition, I have suggested that the patient   monitor for symptoms of bacterial infection expecting slow gradual resolution of viral URI as the natural course.

## 2018-03-20 ENCOUNTER — TRANSFERRED RECORDS (OUTPATIENT)
Dept: FAMILY MEDICINE | Facility: CLINIC | Age: 28
End: 2018-03-20

## 2018-03-20 LAB — HBA1C MFR BLD: 7.6 % (ref 4–6)

## 2018-03-21 ENCOUNTER — TRANSFERRED RECORDS (OUTPATIENT)
Dept: FAMILY MEDICINE | Facility: CLINIC | Age: 28
End: 2018-03-21

## 2018-11-24 DIAGNOSIS — I10 ESSENTIAL HYPERTENSION: ICD-10-CM

## 2018-11-26 RX ORDER — LISINOPRIL/HYDROCHLOROTHIAZIDE 10-12.5 MG
TABLET ORAL
Qty: 90 TABLET | Refills: 0 | OUTPATIENT
Start: 2018-11-26

## 2018-11-26 RX ORDER — LISINOPRIL/HYDROCHLOROTHIAZIDE 10-12.5 MG
1 TABLET ORAL DAILY
Qty: 30 TABLET | Refills: 0 | COMMUNITY
Start: 2018-11-26 | End: 2019-01-07

## 2018-11-26 NOTE — TELEPHONE ENCOUNTER
lisinopril-hydrochlorothiazide (PRINZIDE/ZESTORETIC) 30 days  Samanta in [facility]  Pt is due for a FASTING yearly ov  Eves  [phone]

## 2019-01-07 ENCOUNTER — OFFICE VISIT (OUTPATIENT)
Dept: FAMILY MEDICINE | Facility: CLINIC | Age: 29
End: 2019-01-07

## 2019-01-07 VITALS
OXYGEN SATURATION: 97 % | DIASTOLIC BLOOD PRESSURE: 86 MMHG | WEIGHT: 275 LBS | TEMPERATURE: 98.1 F | BODY MASS INDEX: 39.37 KG/M2 | SYSTOLIC BLOOD PRESSURE: 118 MMHG | HEIGHT: 70 IN | HEART RATE: 91 BPM

## 2019-01-07 DIAGNOSIS — I10 BENIGN ESSENTIAL HYPERTENSION: Primary | ICD-10-CM

## 2019-01-07 DIAGNOSIS — E10.65 UNCONTROLLED TYPE 1 DIABETES MELLITUS WITH HYPERGLYCEMIA (H): ICD-10-CM

## 2019-01-07 PROCEDURE — 90686 IIV4 VACC NO PRSV 0.5 ML IM: CPT | Performed by: FAMILY MEDICINE

## 2019-01-07 PROCEDURE — 99213 OFFICE O/P EST LOW 20 MIN: CPT | Mod: 25 | Performed by: FAMILY MEDICINE

## 2019-01-07 PROCEDURE — 90471 IMMUNIZATION ADMIN: CPT | Performed by: FAMILY MEDICINE

## 2019-01-07 PROCEDURE — 36415 COLL VENOUS BLD VENIPUNCTURE: CPT | Performed by: FAMILY MEDICINE

## 2019-01-07 RX ORDER — LISINOPRIL AND HYDROCHLOROTHIAZIDE 12.5; 2 MG/1; MG/1
1 TABLET ORAL DAILY
Qty: 90 TABLET | Refills: 0 | Status: SHIPPED | OUTPATIENT
Start: 2019-01-07 | End: 2019-06-03

## 2019-01-07 ASSESSMENT — MIFFLIN-ST. JEOR: SCORE: 2228.64

## 2019-01-07 NOTE — NURSING NOTE
Patient is here for medication check today for blood pressure.    Pre-visit Screening:  Immunizations:  up to date  Colonoscopy:  NA  Mammogram: NA  Asthma Action Test/Plan:  ACT given today   PHQ9:  PHQ-2 done today   GAD7:  No concerns  Questioned patient about current smoking habits Pt. has never smoked.  Ok to leave detailed message on voice mail for today's visit only Yes, phone # [phone]

## 2019-01-07 NOTE — PROGRESS NOTES
SUBJECTIVE:  28 year old male, with type 1 diabetis, presents for recheck of his blood pressure    Hypertension Follow-up      Outpatient blood pressures are not being checked.    Low Salt Diet: not monitoring salt      Amount of exercise or physical activity:active job- lost 30 pounds with job change    Problems taking medications regularly: No    Medication side effects: none    Diet: diabetic    7.0 last Hemoglobin A1C- under the care of endocrinologist          PROBLEMS TO ADD ON...  Type 1 Diabetis- managed by endocrinology- insulin pump but insurance does not pay for continuous glucose monitor    Morbid obesity: tying to be more active- avoiding unhealthy snack foods    Problem list and histories reviewed & adjusted, as indicated.  Additional history: as documented    Patient Active Problem List   Diagnosis     Family history of diabetes mellitus     Mild intermittent asthma     Fatty liver disease, nonalcoholic     Essential hypertension     Nonspecific abnormal results of liver function study/ monitor ALT     Health Care Home     Type I diabetes mellitus, uncontrolled (H)     ACP (advance care planning)     Morbid obesity (H)     Past Surgical History:   Procedure Laterality Date     HC CREATE EARDRUM OPENING,GEN ANESTH      P.E. Tubes/ 4-5 surgeries, annual from age 10)     HC REPAIR ING HERNIA,6MO-5YR,REDUC  1992    also umbilical hernia       Social History     Tobacco Use     Smoking status: Never Smoker     Smokeless tobacco: Never Used   Substance Use Topics     Alcohol use: Yes     Alcohol/week: 0.0 oz     Family History   Problem Relation Age of Onset     Hypertension Father      Depression Mother      Diabetes Paternal Uncle      Diabetes Other         2nd cousins     Cancer No family hx of      Heart Disease No family hx of          Current Outpatient Medications   Medication Sig Dispense Refill     blood glucose monitoring (ACCU-CHEK FASTCLIX) lancets Use to test blood sugar 4 times daily or as  "directed. 400 Box 3     insulin lispro (HUMALOG) 100 UNIT/ML injection Sig: Patient uses up to 140 Units/day with insulin pump 10 mL 0     Insulin Syringe (BD INSULIN SYRINGE) 29G X 1/2\" 1 ML MISC Use one syringe 4 daily or as directed. In case of pump stops working 50 each prn     lisinopril-hydrochlorothiazide (PRINZIDE/ZESTORETIC) 20-12.5 MG tablet Take 1 tablet by mouth daily 90 tablet 0     order for DME All diabetic supplies including test strips, lancets and solutions for testing for 3 months.  Please dispense glucometer compatible supplies- Accucehck Franci. Patient needs to check BG 4 times a day. 3 Month 3     metFORMIN (GLUCOPHAGE) 1000 MG tablet Take 1 tablet (1,000 mg) by mouth 2 times daily (with meals) 120 tablet 3       Reviewed and updated as needed this visit by clinical staff  Tobacco  Allergies  Meds       Reviewed and updated as needed this visit by Provider    No recorded blood pressures          ROS:  Constitutional, HEENT, cardiovascular, pulmonary, gi and gu systems are negative, except as otherwise noted.    OBJECTIVE:     /86 (BP Location: Left arm, Patient Position: Sitting, Cuff Size: Adult Large)   Pulse 91   Temp 98.1  F (36.7  C) (Oral)   Ht 1.778 m (5' 10\")   Wt 124.7 kg (275 lb)   SpO2 97%   BMI 39.46 kg/m    Body mass index is 39.46 kg/m .   128/90 with recheck  Regular rate and  rhythm. S1 and S2 normal, no murmurs, clicks, gallops or rubs. No edema or JVD. Chest is clear; no wheezes or rales.      Diagnostic Test Results:  No results found for this or any previous visit (from the past 24 hour(s)).    ASSESSMENT/PLAN:      (I10) Benign essential hypertension  (primary encounter diagnosis)  Comment: diastolic blood pressure above goal- lisinopril dose increased to 20 mg   Plan: BASIC METABOLIC PANEL (QUEST), VENOUS         COLLECTION, lisinopril-hydrochlorothiazide         (PRINZIDE/ZESTORETIC) 20-12.5 MG tablet            Recheck blood pressure and renal function in " "thirty days    (E10.65) Uncontrolled type 1 diabetes mellitus with hyperglycemia (H)  Comment:   Plan:  Doing well- managed by endocrinology        BMI:   Estimated body mass index is 39.46 kg/m  as calculated from the following:    Height as of this encounter: 1.778 m (5' 10\").    Weight as of this encounter: 124.7 kg (275 lb).   Weight management plan: Discussed healthy diet and exercise guidelines           Jacey Mon MD  [facility]    "

## 2019-01-09 LAB
BUN SERPL-MCNC: 12 MG/DL (ref 7–25)
BUN/CREATININE RATIO: ABNORMAL (CALC) (ref 6–22)
CALCIUM SERPL-MCNC: 9.9 MG/DL (ref 8.6–10.3)
CHLORIDE SERPLBLD-SCNC: 100 MMOL/L (ref 98–110)
CO2 SERPL-SCNC: 22 MMOL/L (ref 20–32)
CREAT SERPL-MCNC: 0.64 MG/DL (ref 0.6–1.35)
EGFR AFRICAN AMERICAN - QUEST: 156 ML/MIN/1.73M2
GFR SERPL CREATININE-BSD FRML MDRD: 134 ML/MIN/1.73M2
GLUCOSE - QUEST: 178 MG/DL (ref 65–99)
POTASSIUM SERPL-SCNC: 3.8 MMOL/L (ref 3.5–5.3)
SODIUM SERPL-SCNC: 135 MMOL/L (ref 135–146)

## 2019-01-09 ASSESSMENT — ASTHMA QUESTIONNAIRES: ACT_TOTALSCORE: 25

## 2019-05-21 DIAGNOSIS — I10 BENIGN ESSENTIAL HYPERTENSION: ICD-10-CM

## 2019-05-22 RX ORDER — LISINOPRIL AND HYDROCHLOROTHIAZIDE 12.5; 2 MG/1; MG/1
1 TABLET ORAL DAILY
Qty: 30 TABLET | Refills: 0 | COMMUNITY
Start: 2019-05-22

## 2019-05-22 NOTE — TELEPHONE ENCOUNTER
[facility]  lisinopril-hydrochlorothiazide (PRINZIDE/ZESTORETIC) 20-12.5 MG tablet  Called in 30 pt has an appt on 6-4-2019  Geraldine  [phone] ([place])

## 2019-06-03 ENCOUNTER — OFFICE VISIT (OUTPATIENT)
Dept: FAMILY MEDICINE | Facility: CLINIC | Age: 29
End: 2019-06-03

## 2019-06-03 VITALS
WEIGHT: 281 LBS | BODY MASS INDEX: 40.23 KG/M2 | DIASTOLIC BLOOD PRESSURE: 70 MMHG | HEART RATE: 79 BPM | OXYGEN SATURATION: 97 % | RESPIRATION RATE: 20 BRPM | HEIGHT: 70 IN | SYSTOLIC BLOOD PRESSURE: 118 MMHG

## 2019-06-03 DIAGNOSIS — Z23 NEED FOR VACCINATION: ICD-10-CM

## 2019-06-03 DIAGNOSIS — I10 BENIGN ESSENTIAL HYPERTENSION: ICD-10-CM

## 2019-06-03 DIAGNOSIS — E66.01 MORBID OBESITY (H): ICD-10-CM

## 2019-06-03 PROCEDURE — 99213 OFFICE O/P EST LOW 20 MIN: CPT | Mod: 25 | Performed by: FAMILY MEDICINE

## 2019-06-03 PROCEDURE — 90632 HEPA VACCINE ADULT IM: CPT | Performed by: FAMILY MEDICINE

## 2019-06-03 PROCEDURE — 90471 IMMUNIZATION ADMIN: CPT | Performed by: FAMILY MEDICINE

## 2019-06-03 RX ORDER — LISINOPRIL AND HYDROCHLOROTHIAZIDE 12.5; 2 MG/1; MG/1
1 TABLET ORAL DAILY
Qty: 90 TABLET | Refills: 1 | Status: SHIPPED | OUTPATIENT
Start: 2019-06-03 | End: 2019-11-07

## 2019-06-03 ASSESSMENT — MIFFLIN-ST. JEOR: SCORE: 2250.86

## 2019-06-03 NOTE — PROGRESS NOTES
Gonzalez Gibbs is a 29 year old male who presents to clinic today for the following health issues:    HPI   Hypertension Follow-up      Do you check your blood pressure regularly outside of the clinic? Endocrinology office     Are you following a low salt diet? Trying to improve his diet with myfitnesspal ap- goal is less salt and calories    Are your blood pressures ever more than 140 on the top number (systolic) OR more   than 90 on the bottom number (diastolic), for example 140/90? No    Amount of exercise or physical activity: trying to become more active outside of work- walking (new job will be sitting at desk)    Problems taking medications regularly: No    Medication side effects: none    Other problems  Insulin dependent diabetis  Insulin pump- under care of endocrinology    Patient Active Problem List   Diagnosis     Family history of diabetes mellitus     Mild intermittent asthma     Fatty liver disease, nonalcoholic     Essential hypertension     Nonspecific abnormal results of liver function study/ monitor ALT     Health Care Home     Type I diabetes mellitus, uncontrolled (H)     ACP (advance care planning)     Morbid obesity (H)     Past Surgical History:   Procedure Laterality Date     HC CREATE EARDRUM OPENING,GEN ANESTH      P.E. Tubes/ 4-5 surgeries, annual from age 10)     HC REPAIR ING HERNIA,6MO-5YR,REDUC  1992    also umbilical hernia       Social History     Tobacco Use     Smoking status: Never Smoker     Smokeless tobacco: Never Used   Substance Use Topics     Alcohol use: Yes     Alcohol/week: 0.0 oz     Family History   Problem Relation Age of Onset     Hypertension Father      Depression Mother      Diabetes Paternal Uncle      Diabetes Other         2nd cousins     Cancer No family hx of      Heart Disease No family hx of          Current Outpatient Medications   Medication Sig Dispense Refill     blood glucose monitoring (ACCU-CHEK FASTCLIX) lancets Use to test blood sugar 4  "times daily or as directed. 400 Box 3     Insulin Syringe (BD INSULIN SYRINGE) 29G X 1/2\" 1 ML MISC Use one syringe 4 daily or as directed. In case of pump stops working 50 each prn     lisinopril-hydrochlorothiazide (PRINZIDE/ZESTORETIC) 20-12.5 MG tablet Take 1 tablet by mouth daily 90 tablet 1     metFORMIN (GLUCOPHAGE) 1000 MG tablet Take 1 tablet (1,000 mg) by mouth 2 times daily (with meals) 120 tablet 3     NOVOLOG VIAL 100 UNIT/ML soln USE UP  UNITS D IN INSULIN PUMP UTD  11     order for DME All diabetic supplies including test strips, lancets and solutions for testing for 3 months.  Please dispense glucometer compatible supplies- Accucehck Franci. Patient needs to check BG 4 times a day. 3 Month 3       PROBLEMS TO ADD ON...  MORBID OBESITY  Reviewed and updated as needed this visit by Provider    Asthma is no longer an active problem  Has not used an asthma inhaler for a decade  Removed from problem list  ACT scores 25         Review of Systems   ROS COMP: CONSTITUTIONAL:NEGATIVE for fever, chills, change in weight  INTEGUMENTARY/SKIN: NEGATIVE for worrisome rashes, moles or lesions  ENT/MOUTH: NEGATIVE for ear, mouth and throat problems  RESP:NEGATIVE for significant cough or SOB  CV: NEGATIVE for chest pain, palpitations or peripheral edema  GI: NEGATIVE for nausea, abdominal pain, heartburn, or change in bowel habits  ENDOCRINE: insulin dependent diabetis- compliant with follow up      Objective    /70 (BP Location: Right arm, Patient Position: Sitting, Cuff Size: Adult Large)   Pulse 79   Resp 20   Ht 1.778 m (5' 10\")   Wt 127.5 kg (281 lb)   SpO2 97%   BMI 40.32 kg/m    Body mass index is 40.32 kg/m .  Physical Exam   Regular rate and  rhythm. S1 and S2 normal, no murmurs, clicks, gallops or rubs. No edema or JVD. Chest is clear; no wheezes or rales.    Diagnostic Test Results:  none     Patient defers labs- has an appointment with endocrinology this week  I requested labs be " "forwarded to our office        Assessment & Plan   Problem List Items Addressed This Visit     Morbid obesity (H)    Relevant Medications    NOVOLOG VIAL 100 UNIT/ML soln      Other Visit Diagnoses     Need for vaccination        Relevant Orders    C HEP A VAC ADULT 2 DOSE IM (Completed)    Benign essential hypertension        Relevant Medications    lisinopril-hydrochlorothiazide (PRINZIDE/ZESTORETIC) 20-12.5 MG tablet             BMI:   Estimated body mass index is 40.32 kg/m  as calculated from the following:    Height as of this encounter: 1.778 m (5' 10\").    Weight as of this encounter: 127.5 kg (281 lb).   Weight management plan: Discussed healthy diet and exercise guidelines      FUTURE APPOINTMENTS:       - Follow-up visit in one year for recheck of blood pressure  Current medications refilled for one year    Continue regular follow up with endocrinology  Work on weight loss  Regular exercise  Hep A booster completed  No follow-ups on file.    Jacey Mon MD  [facility]                            "

## 2019-06-03 NOTE — NURSING NOTE
Chief Complaint   Patient presents with     Patient Request     wants to know what a good endocrinologist that Dr. Mon can refer him to, he recently moved     Recheck Medication     medication check and review     Pre-visit Screening:  Immunizations:  up to date  Colonoscopy:  NA  Mammogram: NA  Asthma Action Test/Plan:  ACT given today   PHQ9:  NA  GAD7:  NA  Questioned patient about current smoking habits Pt. has never smoked.  Ok to leave detailed message on voice mail for today's visit only Yes, phone # [phone]

## 2019-06-04 ASSESSMENT — ASTHMA QUESTIONNAIRES: ACT_TOTALSCORE: 25

## 2019-11-07 ENCOUNTER — OFFICE VISIT (OUTPATIENT)
Dept: FAMILY MEDICINE | Facility: CLINIC | Age: 29
End: 2019-11-07

## 2019-11-07 ENCOUNTER — MYC MEDICAL ADVICE (OUTPATIENT)
Dept: FAMILY MEDICINE | Facility: CLINIC | Age: 29
End: 2019-11-07

## 2019-11-07 VITALS
OXYGEN SATURATION: 98 % | WEIGHT: 278 LBS | SYSTOLIC BLOOD PRESSURE: 128 MMHG | HEART RATE: 95 BPM | BODY MASS INDEX: 39.89 KG/M2 | DIASTOLIC BLOOD PRESSURE: 88 MMHG | TEMPERATURE: 98.2 F

## 2019-11-07 DIAGNOSIS — E10.65 UNCONTROLLED TYPE 1 DIABETES MELLITUS WITH HYPERGLYCEMIA (H): ICD-10-CM

## 2019-11-07 DIAGNOSIS — I10 BENIGN ESSENTIAL HYPERTENSION: ICD-10-CM

## 2019-11-07 DIAGNOSIS — F41.1 GENERALIZED ANXIETY DISORDER WITH PANIC ATTACKS: Primary | ICD-10-CM

## 2019-11-07 DIAGNOSIS — F41.0 GENERALIZED ANXIETY DISORDER WITH PANIC ATTACKS: Primary | ICD-10-CM

## 2019-11-07 DIAGNOSIS — E66.01 MORBID OBESITY (H): ICD-10-CM

## 2019-11-07 DIAGNOSIS — J35.1 TONSILLAR HYPERTROPHY: ICD-10-CM

## 2019-11-07 LAB
BUN SERPL-MCNC: 10 MG/DL (ref 7–25)
BUN/CREATININE RATIO: 11.8 (ref 6–22)
CALCIUM SERPL-MCNC: 10.3 MG/DL (ref 8.6–10.3)
CHLORIDE SERPLBLD-SCNC: 103.9 MMOL/L (ref 98–110)
CO2 SERPL-SCNC: 23.9 MMOL/L (ref 20–32)
CREAT SERPL-MCNC: 0.85 MG/DL (ref 0.7–1.18)
GLUCOSE SERPL-MCNC: 168 MG/DL (ref 60–99)
POTASSIUM SERPL-SCNC: 5.03 MMOL/L (ref 3.5–5.3)
SODIUM SERPL-SCNC: 138.8 MMOL/L (ref 135–146)

## 2019-11-07 PROCEDURE — 80048 BASIC METABOLIC PNL TOTAL CA: CPT | Performed by: FAMILY MEDICINE

## 2019-11-07 PROCEDURE — 36415 COLL VENOUS BLD VENIPUNCTURE: CPT | Performed by: FAMILY MEDICINE

## 2019-11-07 PROCEDURE — 99214 OFFICE O/P EST MOD 30 MIN: CPT | Performed by: FAMILY MEDICINE

## 2019-11-07 RX ORDER — LISINOPRIL AND HYDROCHLOROTHIAZIDE 12.5; 2 MG/1; MG/1
1 TABLET ORAL DAILY
Qty: 90 TABLET | Refills: 1 | Status: SHIPPED | OUTPATIENT
Start: 2019-11-07 | End: 2020-06-13

## 2019-11-07 ASSESSMENT — ANXIETY QUESTIONNAIRES
2. NOT BEING ABLE TO STOP OR CONTROL WORRYING: MORE THAN HALF THE DAYS
6. BECOMING EASILY ANNOYED OR IRRITABLE: SEVERAL DAYS
GAD7 TOTAL SCORE: 9
7. FEELING AFRAID AS IF SOMETHING AWFUL MIGHT HAPPEN: SEVERAL DAYS
3. WORRYING TOO MUCH ABOUT DIFFERENT THINGS: MORE THAN HALF THE DAYS
IF YOU CHECKED OFF ANY PROBLEMS ON THIS QUESTIONNAIRE, HOW DIFFICULT HAVE THESE PROBLEMS MADE IT FOR YOU TO DO YOUR WORK, TAKE CARE OF THINGS AT HOME, OR GET ALONG WITH OTHER PEOPLE: SOMEWHAT DIFFICULT
1. FEELING NERVOUS, ANXIOUS, OR ON EDGE: MORE THAN HALF THE DAYS
5. BEING SO RESTLESS THAT IT IS HARD TO SIT STILL: NOT AT ALL

## 2019-11-07 ASSESSMENT — PATIENT HEALTH QUESTIONNAIRE - PHQ9
SUM OF ALL RESPONSES TO PHQ QUESTIONS 1-9: 8
5. POOR APPETITE OR OVEREATING: SEVERAL DAYS

## 2019-11-07 NOTE — PROGRESS NOTES
Subjective     Gonzalez Gibbs is a 29 year old male who presents to clinic today for the following health issues:    HPI   Hypertension Follow-up      Do you check your blood pressure regularly outside of the clinic? Yes :home pressures 130/80    Are you following a low salt diet? No-    Are your blood pressures ever more than 140 on the top number (systolic) OR more   than 90 on the bottom number (diastolic), for example 140/90? No        How many days per week do you miss taking your medication? 0    PROBLEMS TO ADD ON...    TYPE 1 DIABETIS- still trying to transition care to an endocrinologist closer to home  Has an insulin pump- unable to afford continuous glucose monitoring      PROBLEMS TO ADD ON...    Went to urgent care yesterday for rapid heart rate, tingling in his arm- felt dizzy - came in waves   : normal ekg  He was diagnosed with a panic attack    Reviewed and updated as needed this visit by Provider       Review of Systems   ROS COMP: CONSTITUTIONAL: NEGATIVE for fever, chills, change in weight  INTEGUMENTARY/SKIN: NEGATIVE for worrisome rashes, moles or lesions  EYES: NEGATIVE for vision changes or irritation  ENT/MOUTH: NEGATIVE for ear, mouth and throat problems  RESP: NEGATIVE for significant cough or SOB  BREAST: NEGATIVE for masses, tenderness or discharge  CV: POSITIVE for palpitations- dizziness. See above  Feels better when he exercises  GI: NEGATIVE for nausea, abdominal pain, heartburn, or change in bowel habits  : NEGATIVE for frequency, dysuria, or hematuria  MUSCULOSKELETAL: NEGATIVE for significant arthralgias or myalgia  NEURO:POSITIVE for dizziness  ENDOCRINE: NEGATIVE for temperature intolerance, skin/hair changes  HEME: NEGATIVE for bleeding problems  PSYCHIATRIC: Positive for anxiety   Has been seeing a therapist- feels bad about himself. He feels he should be further along financially and with a career at age 29.  Currently working three jobs in order to move from his  father's house and live with his girlfriend.  Binge eating        Objective    /88 (BP Location: Right arm, Patient Position: Sitting, Cuff Size: Adult Large)   Pulse 95   Temp 98.2  F (36.8  C) (Oral)   Wt 126.1 kg (278 lb)   SpO2 98%   BMI 39.89 kg/m    Body mass index is 39.89 kg/m .  Physical Exam   GENERAL: healthy, alert and no distress  External ears  and canals clear bilaterally. TM's normal bilaterally. Nose normal without lesions or discharge. Oropharynx: tonsillar hypertrophy. Neck supple without palpable adenopathy.    RESP: lungs clear to auscultation - no rales, rhonchi or wheezes  CV: regular rate and rhythm,  no murmur, click or rub, no peripheral edema and peripheral pulses strong  MS: no gross musculoskeletal defects noted, no edema  NEURO: Normal strength and tone, mentation intact and speech normal  PSYCH:  PHQ9: scores 8 (two for feeling tired)  ANTONETTE 7  Scores 9    Diagnostic Test Results:  Labs reviewed in Epic  Results for orders placed or performed in visit on 11/07/19   Basic Metabolic Panel (BFP)     Status: Abnormal   Result Value Ref Range    Carbon Dioxide 23.9 20 - 32 mmol/L    Creatinine 0.85 0.70 - 1.18 mg/dL    Glucose 168 (A) 60 - 99 mg/dL    Sodium 138.8 135 - 146 mmol/L    Potassium 5.03 3.5 - 5.3 mmol/L    Chloride 103.9 98 - 110 mmol/L    Urea Nitrogen 10 7 - 25 mg/dL    Calcium 10.3 8.6 - 10.3 mg/dL    BUN/Creatinine Ratio 11.8 6 - 22           Assessment & Plan   (F41.1,  F41.0) Generalized anxiety disorder with panic attacks  (primary encounter diagnosis)  Comment:   Plan: continue to see therapist  (established)      (I10) Benign essential hypertension  Comment: controlled  Plan: Basic Metabolic Panel (BFP), VENOUS COLLECTION,        lisinopril-hydrochlorothiazide         (PRINZIDE/ZESTORETIC) 20-12.5 MG tablet            (J35.1) Tonsillar hypertrophy  Comment: at risk for sleep apnea- (morbid obesity, compromised airway)  Plan: OTOLARYNGOLOGY REFERRAL         "    (E66.01) Morbid obesity (H)  Comment:    Plan:   BMI:   Estimated body mass index is 39.89 kg/m  as calculated from the following:    Height as of 6/3/19: 1.778 m (5' 10\").    Weight as of this encounter: 126.1 kg (278 lb).   Weight management plan: Discussed healthy diet and exercise guidelines          (E10.65) Uncontrolled type 1 diabetes mellitus with hyperglycemia (H)  Comment:    Plan: schedule appointment with endocrinology to transition care-           BMI:   Estimated body mass index is 39.89 kg/m  as calculated from the following:    Height as of 6/3/19: 1.778 m (5' 10\").    Weight as of this encounter: 126.1 kg (278 lb).   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    Jacey Mon MD  [facility]            "

## 2019-11-08 ASSESSMENT — ANXIETY QUESTIONNAIRES: GAD7 TOTAL SCORE: 9

## 2019-11-12 ENCOUNTER — MYC MEDICAL ADVICE (OUTPATIENT)
Dept: FAMILY MEDICINE | Facility: CLINIC | Age: 29
End: 2019-11-12

## 2019-11-14 ENCOUNTER — MYC MEDICAL ADVICE (OUTPATIENT)
Dept: FAMILY MEDICINE | Facility: CLINIC | Age: 29
End: 2019-11-14

## 2019-11-18 ENCOUNTER — MYC MEDICAL ADVICE (OUTPATIENT)
Dept: FAMILY MEDICINE | Facility: CLINIC | Age: 29
End: 2019-11-18

## 2019-11-18 DIAGNOSIS — E10.9 TYPE 1 DIABETES MELLITUS WITHOUT COMPLICATION (H): ICD-10-CM

## 2019-11-18 PROBLEM — F41.0 GENERALIZED ANXIETY DISORDER WITH PANIC ATTACKS: Status: ACTIVE | Noted: 2019-11-18

## 2019-11-18 PROBLEM — F41.1 GENERALIZED ANXIETY DISORDER WITH PANIC ATTACKS: Status: ACTIVE | Noted: 2019-11-18

## 2019-11-18 NOTE — TELEPHONE ENCOUNTER
Gonzalez Gibbs is requesting a refill of:    Pending Prescriptions:                       Disp   Refills    metFORMIN (GLUCOPHAGE) 1000 MG tablet     120 ta*3            Sig: Take 1 tablet (1,000 mg) by mouth 2 times daily           (with meals)    NOVOLOG VIAL 100 UNIT/ML soln                    11           Sig: USE UP  UNITS D IN INSULIN PUMP UTD    Sent Pt my chart. When he responds, please send to Jodi Belle

## 2019-11-19 NOTE — TELEPHONE ENCOUNTER
Still don't know how to order- quantity of insulin  Please call pharmacist (140 units in pump does not translate to number of vials of insulin)

## 2019-11-23 ENCOUNTER — OFFICE VISIT (OUTPATIENT)
Dept: FAMILY MEDICINE | Facility: CLINIC | Age: 29
End: 2019-11-23

## 2019-11-23 VITALS
OXYGEN SATURATION: 97 % | HEART RATE: 97 BPM | DIASTOLIC BLOOD PRESSURE: 80 MMHG | TEMPERATURE: 97.9 F | SYSTOLIC BLOOD PRESSURE: 124 MMHG | BODY MASS INDEX: 39.46 KG/M2 | WEIGHT: 275 LBS

## 2019-11-23 DIAGNOSIS — F41.1 GENERALIZED ANXIETY DISORDER: Primary | ICD-10-CM

## 2019-11-23 DIAGNOSIS — E10.65 UNCONTROLLED TYPE 1 DIABETES MELLITUS WITH HYPERGLYCEMIA (H): ICD-10-CM

## 2019-11-23 PROCEDURE — 99213 OFFICE O/P EST LOW 20 MIN: CPT | Performed by: FAMILY MEDICINE

## 2019-11-23 RX ORDER — ESCITALOPRAM OXALATE 10 MG/1
10 TABLET ORAL DAILY
Qty: 30 TABLET | Refills: 1 | Status: SHIPPED | OUTPATIENT
Start: 2019-11-23 | End: 2020-01-15

## 2019-11-23 RX ORDER — ACETAMINOPHEN 325 MG/1
325-650 TABLET ORAL EVERY 6 HOURS PRN
COMMUNITY
End: 2024-03-19

## 2019-11-23 ASSESSMENT — ANXIETY QUESTIONNAIRES
1. FEELING NERVOUS, ANXIOUS, OR ON EDGE: NEARLY EVERY DAY
IF YOU CHECKED OFF ANY PROBLEMS ON THIS QUESTIONNAIRE, HOW DIFFICULT HAVE THESE PROBLEMS MADE IT FOR YOU TO DO YOUR WORK, TAKE CARE OF THINGS AT HOME, OR GET ALONG WITH OTHER PEOPLE: VERY DIFFICULT
GAD7 TOTAL SCORE: 17
2. NOT BEING ABLE TO STOP OR CONTROL WORRYING: MORE THAN HALF THE DAYS
7. FEELING AFRAID AS IF SOMETHING AWFUL MIGHT HAPPEN: NEARLY EVERY DAY
5. BEING SO RESTLESS THAT IT IS HARD TO SIT STILL: SEVERAL DAYS
6. BECOMING EASILY ANNOYED OR IRRITABLE: MORE THAN HALF THE DAYS
3. WORRYING TOO MUCH ABOUT DIFFERENT THINGS: NEARLY EVERY DAY

## 2019-11-23 ASSESSMENT — PATIENT HEALTH QUESTIONNAIRE - PHQ9
5. POOR APPETITE OR OVEREATING: NEARLY EVERY DAY
SUM OF ALL RESPONSES TO PHQ QUESTIONS 1-9: 18

## 2019-11-23 NOTE — PROGRESS NOTES
"SUBJECTIVE:  I asked to see patient regarding treatment for his anxiety  He would like a trial of medication after talking with his therapist  His mother committed suicide when he was a teenager and he is frightened of the consequence of untreated anxiety and depression.  It worries him \"I am a lot like my mother\"  When asked in what way- he says, his mother did not take good care of herself    He is taking steps to do better with self care  Social: New relationship with a girlfriend- this has been positive for him  He is seeing his therapist regularly  Scheduled to see endocrinology for diabetis care (can not get in until Feb)  Eating better, exercising  Trying to find life balance-working three jobs to try and have enough financial security to move out of the house     Assessment   (F41.1) Generalized anxiety disorder  (primary encounter diagnosis)  Comment:    Plan: VENOUS COLLECTION, escitalopram (LEXAPRO) 10 MG        tablet, CANCELED: Hemoglobin A1c (BFP)  Warned of increased risk of  of self harm- in first 8 weeks-              (E10.65) Uncontrolled type 1 diabetes mellitus with hyperglycemia (H)  Comment:   Plan: Hemoglobin A1c (BFP)           "

## 2019-11-23 NOTE — NURSING NOTE
Enrique is here for an anxiety med check.          Pre-visit Screening:  Immunizations:  up to date  Colonoscopy:  NA  Mammogram:  NA  Asthma Action Test/Plan:  NA  PHQ9:  Done today  GAD7:  Done today  Questioned patient about current smoking habits Pt. has never smoked.  Ok to leave detailed message on voice mail for today's visit only Yes, phone # [phone]

## 2019-11-24 ASSESSMENT — ANXIETY QUESTIONNAIRES: GAD7 TOTAL SCORE: 17

## 2019-12-05 ENCOUNTER — TRANSFERRED RECORDS (OUTPATIENT)
Dept: FAMILY MEDICINE | Facility: CLINIC | Age: 29
End: 2019-12-05

## 2019-12-08 ENCOUNTER — HEALTH MAINTENANCE LETTER (OUTPATIENT)
Age: 29
End: 2019-12-08

## 2019-12-20 ENCOUNTER — MEDICAL CORRESPONDENCE (OUTPATIENT)
Dept: HEALTH INFORMATION MANAGEMENT | Facility: CLINIC | Age: 29
End: 2019-12-20

## 2019-12-27 ENCOUNTER — TELEPHONE (OUTPATIENT)
Dept: ENDOCRINOLOGY | Facility: CLINIC | Age: 29
End: 2019-12-27

## 2019-12-27 NOTE — TELEPHONE ENCOUNTER
Statement of medical necessity for insulin pump received via fax. Form in your mailbox to be signed.

## 2019-12-30 NOTE — TELEPHONE ENCOUNTER
Will address in 2/2020 visit. Last visit 2016.    Lab Results   Component Value Date    A1C 7.6 03/20/2018    A1C 8.0 06/15/2017    A1C 8.5 12/01/2016    A1C 7.8 06/13/2016    A1C 8.1 04/07/2016     Due for labs.

## 2019-12-30 NOTE — TELEPHONE ENCOUNTER
LAST OFFICE/VIRTUAL VISIT:  12/01/16    FUTURE OFFICE/VIRTUAL VISIT:  02/04/20    Lab Results   Component Value Date    A1C 7.6 03/20/2018    A1C 8.0 06/15/2017    A1C 8.5 12/01/2016    A1C 7.8 06/13/2016    A1C 8.1 04/07/2016         Estrella Laura CMA  [facility]  [place]

## 2020-01-02 NOTE — TELEPHONE ENCOUNTER
Form was faxed, copied, sent to scanning.      Estrella Laura, [facility]  [place]

## 2020-01-15 ENCOUNTER — OFFICE VISIT (OUTPATIENT)
Dept: FAMILY MEDICINE | Facility: CLINIC | Age: 30
End: 2020-01-15

## 2020-01-15 VITALS
TEMPERATURE: 97.6 F | WEIGHT: 280.6 LBS | HEART RATE: 78 BPM | OXYGEN SATURATION: 98 % | DIASTOLIC BLOOD PRESSURE: 74 MMHG | SYSTOLIC BLOOD PRESSURE: 120 MMHG | HEIGHT: 69 IN | BODY MASS INDEX: 41.56 KG/M2

## 2020-01-15 DIAGNOSIS — F41.1 GENERALIZED ANXIETY DISORDER: ICD-10-CM

## 2020-01-15 PROCEDURE — 99213 OFFICE O/P EST LOW 20 MIN: CPT | Performed by: FAMILY MEDICINE

## 2020-01-15 RX ORDER — ESCITALOPRAM OXALATE 10 MG/1
10 TABLET ORAL DAILY
Qty: 90 TABLET | Refills: 1 | Status: SHIPPED | OUTPATIENT
Start: 2020-01-15 | End: 2020-06-17

## 2020-01-15 ASSESSMENT — ANXIETY QUESTIONNAIRES
7. FEELING AFRAID AS IF SOMETHING AWFUL MIGHT HAPPEN: SEVERAL DAYS
IF YOU CHECKED OFF ANY PROBLEMS ON THIS QUESTIONNAIRE, HOW DIFFICULT HAVE THESE PROBLEMS MADE IT FOR YOU TO DO YOUR WORK, TAKE CARE OF THINGS AT HOME, OR GET ALONG WITH OTHER PEOPLE: NOT DIFFICULT AT ALL
1. FEELING NERVOUS, ANXIOUS, OR ON EDGE: SEVERAL DAYS
5. BEING SO RESTLESS THAT IT IS HARD TO SIT STILL: NOT AT ALL
2. NOT BEING ABLE TO STOP OR CONTROL WORRYING: SEVERAL DAYS
6. BECOMING EASILY ANNOYED OR IRRITABLE: SEVERAL DAYS
GAD7 TOTAL SCORE: 6
3. WORRYING TOO MUCH ABOUT DIFFERENT THINGS: SEVERAL DAYS

## 2020-01-15 ASSESSMENT — PATIENT HEALTH QUESTIONNAIRE - PHQ9
SUM OF ALL RESPONSES TO PHQ QUESTIONS 1-9: 5
5. POOR APPETITE OR OVEREATING: SEVERAL DAYS

## 2020-01-15 ASSESSMENT — MIFFLIN-ST. JEOR: SCORE: 2229.2

## 2020-01-15 NOTE — PROGRESS NOTES
Subjective     Gonzalez Gibbs is a 29 year old male who presents to clinic today for the following health issues:    HPI   Depression and Anxiety Follow-Up    How are you doing with your depression since your last visit? Improved      How are you doing with your anxiety since your last visit?  Improved      Are you having other symptoms that might be associated with depression or anxiety? Yes:  palpitations- panic    Have you had a significant life event? Grief or Loss- lilibeth remains a difficult holiday after his mother's death     Do you have any concerns with your use of alcohol or other drugs? No      Side effects: more tired initially, heart racing initially- these symptoms have resolved or greatly improved  Dosing at night time  Weight gain: did not work out much of December-  He quit his third job- he and his girlfriend are trying to commit to a more active lifestyle  He sees his therapist regularly  Coping better -     Insulin dependent diabetic- on a pump but not a continous glucose monitor  Transferring care to a new endocrinologist  Dr Lezama: appt Feb 4        Social History     Tobacco Use     Smoking status: Never Smoker     Smokeless tobacco: Never Used   Substance Use Topics     Alcohol use: Yes     Alcohol/week: 0.0 standard drinks     Drug use: No     PHQ 11/7/2019 11/23/2019   PHQ-9 Total Score 8 18   Q9: Thoughts of better off dead/self-harm past 2 weeks Not at all Not at all     ANTONETTE-7 SCORE 11/7/2019 11/23/2019   Total Score 9 17     Last PHQ-9 1/15/2020   1.  Little interest or pleasure in doing things 1   2.  Feeling down, depressed, or hopeless 1   3.  Trouble falling or staying asleep, or sleeping too much 0   4.  Feeling tired or having little energy 1   5.  Poor appetite or overeating 1   6.  Feeling bad about yourself 1   7.  Trouble concentrating 0   8.  Moving slowly or restless 0   Q9: Thoughts of better off dead/self-harm past 2 weeks 0   PHQ-9 Total Score 5   Difficulty  at work, home, or with people Not difficult at all     ANTONETTE-7  1/15/2020   1. Feeling nervous, anxious, or on edge 1   2. Not being able to stop or control worrying 1   3. Worrying too much about different things 1   4. Trouble relaxing 1   5. Being so restless that it is hard to sit still 0   6. Becoming easily annoyed or irritable 1   7. Feeling afraid, as if something awful might happen 1   ANTONETTE-7 Total Score 6   If you checked any problems, how difficult have they made it for you to do your work, take care of things at home, or get along with other people? Not difficult at all        How many days per week do you miss taking your medication? 0       Patient Active Problem List   Diagnosis     Family history of diabetes mellitus     Fatty liver disease, nonalcoholic     Essential hypertension     Nonspecific abnormal results of liver function study/ monitor ALT     Health Care Home     Type I diabetes mellitus, uncontrolled (H)     ACP (advance care planning)     Morbid obesity (H)     Generalized anxiety disorder with panic attacks     Past Surgical History:   Procedure Laterality Date     HC CREATE EARDRUM OPENING,GEN ANESTH      P.E. Tubes/ 4-5 surgeries, annual from age 10)     HC REPAIR ING HERNIA,6MO-5YR,REDUC  1992    also umbilical hernia       Social History     Tobacco Use     Smoking status: Never Smoker     Smokeless tobacco: Never Used   Substance Use Topics     Alcohol use: Yes     Alcohol/week: 0.0 standard drinks     Family History   Problem Relation Age of Onset     Hypertension Father      Depression Mother      Diabetes Paternal Uncle      Diabetes Other         2nd cousins     Cancer No family hx of      Heart Disease No family hx of          Current Outpatient Medications   Medication Sig Dispense Refill     acetaminophen (TYLENOL) 325 MG tablet Take 325-650 mg by mouth every 6 hours as needed for mild pain       blood glucose monitoring (ACCU-CHEK FASTCLIX) lancets Use to test blood sugar 4  "times daily or as directed. 400 Box 3     escitalopram (LEXAPRO) 10 MG tablet Take 1 tablet (10 mg) by mouth daily 90 tablet 1     Insulin Syringe (BD INSULIN SYRINGE) 29G X 1/2\" 1 ML MISC Use one syringe 4 daily or as directed. In case of pump stops working 50 each prn     lisinopril-hydrochlorothiazide (PRINZIDE/ZESTORETIC) 20-12.5 MG tablet Take 1 tablet by mouth daily 90 tablet 1     metFORMIN (GLUCOPHAGE) 1000 MG tablet Take 1 tablet (1,000 mg) by mouth 2 times daily (with meals) 180 tablet 1     NOVOLOG VIAL 100 UNIT/ML soln USE UP  UNITS D IN INSULIN PUMP UTD 40 mL 1     order for DME All diabetic supplies including test strips, lancets and solutions for testing for 3 months.  Please dispense glucometer compatible supplies- [employer] Franci. Patient needs to check BG 4 times a day. 3 Month 3     BP Readings from Last 3 Encounters:   01/15/20 120/74   11/23/19 124/80   11/07/19 128/88    Wt Readings from Last 3 Encounters:   01/15/20 127.3 kg (280 lb 9.6 oz)   11/23/19 124.7 kg (275 lb)   11/07/19 126.1 kg (278 lb)            Problems Reviewed and updated as needed this visit by Provider         Review of Systems   ROS COMP: Constitutional, HEENT, cardiovascular, pulmonary, gi and gu systems are negative, except as otherwise noted.      Objective    /74 (BP Location: Left arm, Patient Position: Sitting, Cuff Size: Adult Large)   Pulse 78   Temp 97.6  F (36.4  C) (Oral)   Ht 1.746 m (5' 8.75\")   Wt 127.3 kg (280 lb 9.6 oz)   SpO2 98%   BMI 41.74 kg/m    There is no height or weight on file to calculate BMI.  Physical Exam   ALert and oriented times 3; Coherent speech, normal rate and volume, able to articulate, logical thoughts, able to abstract reason, no tangential thoughts, no hallucinations or delusions. Affect is pleasant      Diagnostic Test Results:  Labs reviewed in Epic  none         Assessment & Plan   Problem List Items Addressed This Visit     None      Visit Diagnoses     " Generalized anxiety disorder        Relevant Medications    escitalopram (LEXAPRO) 10 MG tablet         Continue current dose  Focus on lifestyle : diet and exercise  Continue regular visits with his therapist  Establish care with endocrinology      FUTURE APPOINTMENTS:       - Follow-up visit in six months with new provider  No follow-ups on file.    Jacey Mon MD  [facility]

## 2020-01-15 NOTE — NURSING NOTE
Enrique is here to recheck meds    Pre-visit Screening:  Immunizations:  up to date  Colonoscopy:  NA  Mammogram: NA  Asthma Action Test/Plan:  NA  PHQ9:  Done today  GAD7:  Done today  Questioned patient about current smoking habits Pt. has never smoked.  Ok to leave detailed message on voice mail for today's visit only Yes, phone # [phone]

## 2020-01-16 ASSESSMENT — ANXIETY QUESTIONNAIRES: GAD7 TOTAL SCORE: 6

## 2020-01-20 DIAGNOSIS — E10.65 UNCONTROLLED TYPE 1 DIABETES MELLITUS WITH HYPERGLYCEMIA (H): Primary | ICD-10-CM

## 2020-01-31 DIAGNOSIS — E10.9 TYPE 1 DIABETES MELLITUS WITHOUT COMPLICATION (H): ICD-10-CM

## 2020-01-31 NOTE — TELEPHONE ENCOUNTER
Gonzalez Gibbs is requesting a refill of:    Pending Prescriptions:                       Disp   Refills    NOVOLOG VIAL 100 UNIT/ML soln [Pharmacy M*40 mL  0            Sig: ADMINISTER UP  UNITS VIA INSULIN PUMP EVERY           DAY AS DIRECTED    Pt has OV on 2/4/20 with Cyndy

## 2020-02-03 DIAGNOSIS — E10.65 UNCONTROLLED TYPE 1 DIABETES MELLITUS WITH HYPERGLYCEMIA (H): ICD-10-CM

## 2020-02-03 LAB — HBA1C MFR BLD: 7 % (ref 0–5.6)

## 2020-02-03 PROCEDURE — 82043 UR ALBUMIN QUANTITATIVE: CPT | Performed by: INTERNAL MEDICINE

## 2020-02-03 PROCEDURE — 36415 COLL VENOUS BLD VENIPUNCTURE: CPT | Performed by: INTERNAL MEDICINE

## 2020-02-03 PROCEDURE — 83036 HEMOGLOBIN GLYCOSYLATED A1C: CPT | Performed by: INTERNAL MEDICINE

## 2020-02-04 ENCOUNTER — OFFICE VISIT (OUTPATIENT)
Dept: ENDOCRINOLOGY | Facility: CLINIC | Age: 30
End: 2020-02-04
Payer: COMMERCIAL

## 2020-02-04 ENCOUNTER — ALLIED HEALTH/NURSE VISIT (OUTPATIENT)
Dept: EDUCATION SERVICES | Facility: CLINIC | Age: 30
End: 2020-02-04
Payer: COMMERCIAL

## 2020-02-04 VITALS
DIASTOLIC BLOOD PRESSURE: 79 MMHG | WEIGHT: 281.9 LBS | HEART RATE: 80 BPM | SYSTOLIC BLOOD PRESSURE: 133 MMHG | HEIGHT: 69 IN | OXYGEN SATURATION: 98 % | BODY MASS INDEX: 41.75 KG/M2 | RESPIRATION RATE: 16 BRPM | TEMPERATURE: 97.6 F

## 2020-02-04 DIAGNOSIS — E10.9 TYPE 1 DIABETES MELLITUS WITHOUT COMPLICATION (H): ICD-10-CM

## 2020-02-04 DIAGNOSIS — E10.65 UNCONTROLLED TYPE 1 DIABETES MELLITUS WITH HYPERGLYCEMIA (H): Primary | ICD-10-CM

## 2020-02-04 LAB
CREAT UR-MCNC: 13 MG/DL
MICROALBUMIN UR-MCNC: <5 MG/L
MICROALBUMIN/CREAT UR: NORMAL MG/G CR (ref 0–17)

## 2020-02-04 PROCEDURE — 99207 HC GLUCOSE MONITORING CONTINUOUS, >=72 HR: CPT

## 2020-02-04 PROCEDURE — 99214 OFFICE O/P EST MOD 30 MIN: CPT | Performed by: INTERNAL MEDICINE

## 2020-02-04 RX ORDER — LANCETS
EACH MISCELLANEOUS
Qty: 300 EACH | Refills: 1 | Status: SHIPPED | OUTPATIENT
Start: 2020-02-04

## 2020-02-04 RX ORDER — INSULIN GLARGINE 100 [IU]/ML
INJECTION, SOLUTION SUBCUTANEOUS
COMMUNITY
Start: 2020-02-04 | End: 2021-10-14

## 2020-02-04 RX ORDER — IBUPROFEN 200 MG
TABLET ORAL
Qty: 50 EACH | Status: SHIPPED | OUTPATIENT
Start: 2020-02-04 | End: 2022-04-26

## 2020-02-04 ASSESSMENT — MIFFLIN-ST. JEOR: SCORE: 2235.1

## 2020-02-04 NOTE — PATIENT INSTRUCTIONS
[facility] & [place] locations   Dr Katz, Endocrinology Department      [facility]   [address]  [place] [address]  Appointment [phone]  Fax: [phone]  [place]: Monday and Tuesday         [facility]   [address]  [place] [address]  Appointment [phone]  Fax: [phone]  [place]: Wednesday and Thursday            Please check the cost coverage and copay with insurance before recommended tests, services and medications (especially if new medications are prescribed).     If ordered, please get blood work done 1 week prior to your next appointment so they will be available to Dr. Katz at your visit.    To provide the best diabetic care, please bring your blood glucose meter to each and every visit with your  Endocrinologist. Your blood glucose meter/insulin pump will be downloaded at every appointment.  Please arrive 15 minutes before your scheduled appointment. This will allow for your blood glucose meter/insulin pump  to be downloaded.  If you are wearing DEXCOM please bring  or sharing code from the Dexcom Clarity Appt so that it can be downloaded.  If you are using freestyle steve personal sensors please bring the reader.  If you are using TANDEM insulin pump please have your username and password to get info from Tandem website.    Continue pump settings.  Your provider has referred you to Diabetes Education: For all [facility]:  Phone [phone]; Fax [phone]  Please call and make the appointment.-->continous glucose monitor (dexom, ipro) (diagnostic)  Consider Medtronic 670 G pump.  Also check cost of DEXCOM with insurance.    Fasting labs in 3 months.  Please make a lab appointment for blood work and follow up clinic appointment in 1 week after that to discuss results.

## 2020-02-04 NOTE — PATIENT INSTRUCTIONS
Call if sensor falls off before Friday for replacement. Can remove on 2/18 and place in baggie for follow-up appt.    1. Plan to wear the LibrePro sensor for 14 days. It is okay to shower, bathe, and swim (up to 3 feet deep for 30 minutes)    2. Continue with your usual diabetes care plan - check blood sugars and take medicines, as prescribed.    3. Keep a log of what you eat and drink, when you take your medications and how much you take, and exercise you do while you are wearing the sensor.    3. Do not cover the sensor with extra adhesive (the small hole in the center of the sensor must remain uncovered)    4. Use a little extra care, especially when getting dressed or exercising, to avoid accidentally loosening or removing the sensor.     5. Remove the sensor if you need to have an MRI or CT scan.     If the LibrePro sensor comes off early, place it in a plastic bag or envelope and call your diabetes educator or bring it with you to your follow-up visit.     Return the sensor to the [facility] on 2-.    Follow-up appointment: 2- at 1pm    [facility] for the [facility] Area:  For Your Diabetes Education and Nutrition Appointments Call:  [phone]   For Diabetes Education or Nutrition Related Questions:   Phone: [phone]  E-mail: [email or web address]  Fax: [phone]   If you need a medication refill please contact your pharmacy. Please allow 3 business days for your refills to be completed.

## 2020-02-04 NOTE — PROGRESS NOTES
Endocrinology Clinic Note:  Name: Gonzalez Gibbs  Seen for Diabetes f/u. LAST VISIT 2016.   HPI:  Gonzalez Gibbs is a 29 year old male who presents for the evaluation/management of type 1 diabetes.  Was getting care at endocrinology clinic of [place] since last 2016 visit. Records requested.  On insulin pump for last 10-12 years.  Using TANDEM insulin pump. Using Novolog in pump.  On this pump X 4 years.    Has questions about newer insulin pump as well as CGM.     1. Type 1 DM:  Orginally diagnosed at the age of: 13 years   Hospitalization for DKA: None  Current Regimen: Insulin pump and metformin 1000 mg BID  BS checks: 3-4 times a day  Average Meter Download: 169 with Sd  70. Not able to download meter.  Blood sugars are mostly in acceptable range.  No major episodes of hypoglycemia noted or reported.    Exercise: Few times a week  Last A1c: 7.0%  Episodes of hypoglycemia (low blood sugar): Occasional.  Gets symptoms  Fixed meal pattern: Yes  Patient counting carbs: Yes  Using PUMP: Yes.   Current Regimen:   Insulin pump -   Time Rate (U/hr)   0000-  2.6   0700  2.4   1800  2.5   2100  2.5     Carbohydrate Ratio -    Time Ratio   0000-  3   0700  1800  2100  2   2   3     Sensitivity   18   Active Insulin Time   hours   Basal    52% (54.8 Units)   Bolus     48% (51 Units)   Total Carbohydrates/day    205   Total Insulin/day     105 + 30   Average Blood Sugar                  DM Complications:   Nephropathy: No  Retinopathy: No   Neuropathy: No  Microalbuminuria: No  CAD/PAD: No  Gastroparesis: No  Hypoglycemia unawareness: No     2. Hypertension: Blood Pressure today:   BP Readings from Last 3 Encounters:   02/04/20 133/79   01/15/20 120/74   11/23/19 124/80      Blood pressure medications include lisinopril 10 mg.    3. Hyperlipidemia:  Not on medication.     PMH/PSH:  Past Medical History:   Diagnosis Date     Family history of diabetes mellitus      Mild intermittent asthma     EIA     Mild  intermittent asthma     EIA     Type I diabetes mellitus, uncontrolled (H) 6/13/2016     Past Surgical History:   Procedure Laterality Date     HC CREATE EARDRUM OPENING,GEN ANESTH      P.E. Tubes/ 4-5 surgeries, annual from age 10)     HC REPAIR ING HERNIA,6MO-5YR,REDUC  1992    also umbilical hernia     Family Hx:  Family History   Problem Relation Age of Onset     Hypertension Father      Depression Mother      Diabetes Paternal Uncle      Diabetes Other         2nd cousins     Cancer No family hx of      Heart Disease No family hx of            DM2:             Social Hx:  Social History     Socioeconomic History     Marital status: Single     Spouse name: Not on file     Number of children: Not on file     Years of education: Not on file     Highest education level: Not on file   Occupational History     Occupation: [employer]   Social Needs     Financial resource strain: Not on file     Food insecurity:     Worry: Not on file     Inability: Not on file     Transportation needs:     Medical: Not on file     Non-medical: Not on file   Tobacco Use     Smoking status: Never Smoker     Smokeless tobacco: Never Used   Substance and Sexual Activity     Alcohol use: Yes     Alcohol/week: 0.0 standard drinks     Drug use: No     Sexual activity: Not Currently   Lifestyle     Physical activity:     Days per week: Not on file     Minutes per session: Not on file     Stress: Not on file   Relationships     Social connections:     Talks on phone: Not on file     Gets together: Not on file     Attends Rastafari service: Not on file     Active member of club or organization: Not on file     Attends meetings of clubs or organizations: Not on file     Relationship status: Not on file     Intimate partner violence:     Fear of current or ex partner: Not on file     Emotionally abused: Not on file     Physically abused: Not on file     Forced sexual activity: Not on file   Other Topics Concern      Service Not Asked      "Blood Transfusions Not Asked     Caffeine Concern Not Asked     Occupational Exposure Not Asked     Hobby Hazards Not Asked     Sleep Concern Not Asked     Stress Concern Not Asked     Weight Concern Not Asked     Special Diet Not Asked     Back Care Not Asked     Exercise Yes     Bike Helmet Not Asked     Seat Belt Yes     Self-Exams Not Asked     Parent/sibling w/ CABG, MI or angioplasty before 65F 55M? Not Asked   Social History Narrative     Not on file          MEDICATIONS:  has a current medication list which includes the following prescription(s): acetaminophen, blood glucose monitoring, escitalopram, insulin syringe-needle u-100, lisinopril-hydrochlorothiazide, metformin, novolog vial, and order for dme.    ROS     ROS: 10 point ROS neg other than the symptoms noted above in the HPI.    Physical Exam   VS: /79 (BP Location: Right arm, Patient Position: Chair, Cuff Size: Adult Large)   Pulse 80   Temp 97.6  F (36.4  C) (Oral)   Resp 16   Ht 1.746 m (5' 8.75\")   Wt 127.9 kg (281 lb 14.4 oz)   SpO2 98%   BMI 41.93 kg/m     GENERAL: AXOX3, NAD, well dressed, answering questions appropriately, appears stated age.  HEENT: No exopthalmous, no proptosis, EOMI, no lig lag, no retraction  NECK: Thyroid normal in size, non tender, no nodules were palpated.  CV: RRR  LUNGS: CTAB  ABDOMEN: +BS  NEUROLOGY: CN grossly intact, no tremors  PSYCH: normal affect and mood      LABS:  A1c:  Lab Results   Component Value Date    A1C 7.0 02/03/2020    A1C 7.6 03/20/2018    A1C 8.0 06/15/2017    A1C 8.5 12/01/2016    A1C 7.8 06/13/2016     BMP:  Creatinine   Date Value Ref Range Status   11/07/2019 0.85 0.70 - 1.18 mg/dL Final       Urine Micro:  Lab Results   Component Value Date    UMALCR 5 06/15/2017        LFTs/Lipids:  Recent Labs   Lab Test 06/15/17 12/01/16  0845 05/03/12  1046   CHOL 175 222* 204*   HDL 46 43 51    137* 124   TRIG 127 211* 143   CHOLHDLRATIO 3.8  --  4.0       TFTs:  Lab Results "   Component Value Date    TSH 1.67 06/15/2017       Blood Glucose and pump data/ Meter reviewed.     All pertinent notes, labs, and images personally reviewed by me.     A/P  Mr.Christopher ANTONIO Gibbs is a 26 year old here for the evaluation/management of diabetes:    1. DM1 - Under Poor control.  A1c 7.0%.  No complications associated with diabetes.  In general blood sugars are in acceptable range though limited blood sugar data is available.  No major episodes of hypoglycemia.  Plan:  Need more data- recommend diagnostic CGM which was placed by CDE.  Briefly discussed Medtronic 670 G pump  Also discussed Dexcom if he wants to continue with tandem  He will check cost with insurance  Referral to diabetic educator for further education about the newer pumps  Repeat labs and follow-up in 3 months.      2. Hypertension - Under Fair  control.  Takes lisinopril 10 mg      3. Hyperlipidemia - not on medication.  No recent data to review.  -- get lipid panel in next labs.  4. Prevention  Opthalmology: Due for eye examination  ASA-no 2/2 to age  Smoking- no    Most Recent Immunizations   Administered Date(s) Administered     Flu, Unspecified 10/12/2019     HEPA 11/01/2008     HepA-Adult 06/03/2019     HepB 01/09/2004     Hib (PRP-T) 05/27/1992     Historical DTP/aP 01/01/1996     Influenza (H1N1) 12/08/2009     Influenza (IIV3) PF 10/17/2012     Influenza Vaccine IM > 6 months Valent IIV4 01/07/2019     MMR 11/14/2003     OPV, trivalent, live 12/13/1995     Pneumococcal 23 valent 11/18/2017     TD (ADULT, 7+) 11/14/2003     Tdap (Adacel,Boostrix) 11/11/2014     Recommend checking blood sugars before meals and at bedtime.    If Blood glucose are low more often-> 2-3 times/week- give us a call.  The patient is advised to Make better food choices: reduce carbs, Reduce portion size, weight loss and exercise 3-4 times a week.  Discussed hypoglycemia signs and symptoms as well as management in detail.      Follow-up:  Follow up  in 3 month(s)    Bridgette Katz MD  Endocrinology   [facility]  February 4, 2020  CC: Jacey Mon    All questions were answered.  The patient indicates understanding of the above issues and agrees with the plan set forth.     Disclaimer: This note consists of symbols derived from keyboarding, dictation and/or voice recognition software. As a result, there may be errors in the script that have gone undetected. Please consider this when interpreting information found in this chart.

## 2020-02-04 NOTE — PROGRESS NOTES
Diabetes Self-Management Education & Support    Professional Continuous Glucose Monitor Insertion    SUBJECTIVE/OBJECTIVE:     Gonzalez Gibbs presents for professional Continuous Glucose Monitor Insertion.     Patient comments/concerns:   Fluctuating BG's. Help with pump dose changes. Would like personal CGM.    Lab Results:  Lab Results   Component Value Date    A1C 7.0 02/03/2020      Lab Results   Component Value Date     11/07/2019       Medication:  Diabetes Medication(s)     Biguanides       metFORMIN (GLUCOPHAGE) 1000 MG tablet    Take 1 tablet (1,000 mg) by mouth 2 times daily (with meals)    Diabetic Other       glucagon (GLUCAGON EMERGENCY) 1 MG kit    Inject 1 mg Subcutaneous as needed for low blood sugar    Insulin       insulin glargine (LANTUS VIAL) 100 UNIT/ML vial    In case of pump malfunction     NOVOLOG VIAL 100 UNIT/ML soln    ADMINISTER UP  UNITS VIA INSULIN PUMP EVERY DAY AS DIRECTED          ASSESSMENT:    CGM study indicated for: Unexplained fluctuations in glucose values     INTERVENTION:   Sensor started today.     Sensor Type: LibrePro  Lot #: [id number]  Serial #: [id number]  Expiration Date: 05/31/20    Sensor was inserted with no resistance or bleeding at insertion site.      Pt will plan to wear the sensor through 2-.    WRITTEN AND VERBAL INFORMATION GIVEN TO SUPPORT UNDERSTANDING OF:  LibrePro CGM: Sensor insertion, intention of monitoring for 14 days. Keep records of BG, food intake, exercise, and medication dosing during wear.       Patient verbalizes understanding of how to remove sensor, if needed, and all instructions provided.     Educational and other materials:  Food/exercise/medication log sheets  Contact information    PLAN:  Pt was given instructions for tracking BG, medications, food intake and activity.  Patient to return all items associated with the professional Continuous Glucose Monitor System.  See Patient Instructions, AVS printed and  provided to patient today.    Follow-up:    Follow up on 2-.    Samina Worthington RN, CDE    Time Spent: 15 minutes  Encounter Type: Individual

## 2020-02-04 NOTE — LETTER
2/4/2020         RE: Gonzalez Gibbs  [address]  [place] [address]        Dear Colleague,    Thank you for referring your patient, Gonzalez Gibbs, to the [facility]. Please see a copy of my visit note below.    Endocrinology Clinic Note:  Name: Gonzalez Gibbs  Seen for Diabetes f/u. LAST VISIT 2016.   HPI:  Gonzalez Gibbs is a 29 year old male who presents for the evaluation/management of type 1 diabetes.  Was getting care at endocrinology clinic of [place] since last 2016 visit. Records requested.  On insulin pump for last 10-12 years.  Using TANDEM insulin pump. Using Novolog in pump.  On this pump X 4 years.    Has questions about newer insulin pump as well as CGM.     1. Type 1 DM:  Orginally diagnosed at the age of: 13 years   Hospitalization for DKA: None  Current Regimen: Insulin pump and metformin 1000 mg BID  BS checks: 3-4 times a day  Average Meter Download: 169 with Sd  70. Not able to download meter.  Blood sugars are mostly in acceptable range.  No major episodes of hypoglycemia noted or reported.    Exercise: Few times a week  Last A1c: 7.0%  Episodes of hypoglycemia (low blood sugar): Occasional.  Gets symptoms  Fixed meal pattern: Yes  Patient counting carbs: Yes  Using PUMP: Yes.   Current Regimen:   Insulin pump -   Time Rate (U/hr)   0000-  2.6   0700  2.4   1800  2.5   2100  2.5     Carbohydrate Ratio -    Time Ratio   0000-  3   0700  1800  2100  2   2   3     Sensitivity   18   Active Insulin Time   hours   Basal    52% (54.8 Units)   Bolus     48% (51 Units)   Total Carbohydrates/day    205   Total Insulin/day     105 + 30   Average Blood Sugar                  DM Complications:   Nephropathy: No  Retinopathy: No   Neuropathy: No  Microalbuminuria: No  CAD/PAD: No  Gastroparesis: No  Hypoglycemia unawareness: No     2. Hypertension: Blood Pressure today:   BP Readings from Last 3 Encounters:   02/04/20 133/79   01/15/20  120/74   11/23/19 124/80      Blood pressure medications include lisinopril 10 mg.    3. Hyperlipidemia:  Not on medication.     PMH/PSH:  Past Medical History:   Diagnosis Date     Family history of diabetes mellitus      Mild intermittent asthma     EIA     Mild intermittent asthma     EIA     Type I diabetes mellitus, uncontrolled (H) 6/13/2016     Past Surgical History:   Procedure Laterality Date     HC CREATE EARDRUM OPENING,GEN ANESTH      P.E. Tubes/ 4-5 surgeries, annual from age 10)     HC REPAIR ING HERNIA,6MO-5YR,REDUC  1992    also umbilical hernia     Family Hx:  Family History   Problem Relation Age of Onset     Hypertension Father      Depression Mother      Diabetes Paternal Uncle      Diabetes Other         2nd cousins     Cancer No family hx of      Heart Disease No family hx of            DM2:             Social Hx:  Social History     Socioeconomic History     Marital status: Single     Spouse name: Not on file     Number of children: Not on file     Years of education: Not on file     Highest education level: Not on file   Occupational History     Occupation: [employer]   Social Needs     Financial resource strain: Not on file     Food insecurity:     Worry: Not on file     Inability: Not on file     Transportation needs:     Medical: Not on file     Non-medical: Not on file   Tobacco Use     Smoking status: Never Smoker     Smokeless tobacco: Never Used   Substance and Sexual Activity     Alcohol use: Yes     Alcohol/week: 0.0 standard drinks     Drug use: No     Sexual activity: Not Currently   Lifestyle     Physical activity:     Days per week: Not on file     Minutes per session: Not on file     Stress: Not on file   Relationships     Social connections:     Talks on phone: Not on file     Gets together: Not on file     Attends Hindu service: Not on file     Active member of club or organization: Not on file     Attends meetings of clubs or organizations: Not on file     Relationship  "status: Not on file     Intimate partner violence:     Fear of current or ex partner: Not on file     Emotionally abused: Not on file     Physically abused: Not on file     Forced sexual activity: Not on file   Other Topics Concern      Service Not Asked     Blood Transfusions Not Asked     Caffeine Concern Not Asked     Occupational Exposure Not Asked     Hobby Hazards Not Asked     Sleep Concern Not Asked     Stress Concern Not Asked     Weight Concern Not Asked     Special Diet Not Asked     Back Care Not Asked     Exercise Yes     Bike Helmet Not Asked     Seat Belt Yes     Self-Exams Not Asked     Parent/sibling w/ CABG, MI or angioplasty before 65F 55M? Not Asked   Social History Narrative     Not on file          MEDICATIONS:  has a current medication list which includes the following prescription(s): acetaminophen, blood glucose monitoring, escitalopram, insulin syringe-needle u-100, lisinopril-hydrochlorothiazide, metformin, novolog vial, and order for dme.    ROS     ROS: 10 point ROS neg other than the symptoms noted above in the HPI.    Physical Exam   VS: /79 (BP Location: Right arm, Patient Position: Chair, Cuff Size: Adult Large)   Pulse 80   Temp 97.6  F (36.4  C) (Oral)   Resp 16   Ht 1.746 m (5' 8.75\")   Wt 127.9 kg (281 lb 14.4 oz)   SpO2 98%   BMI 41.93 kg/m      GENERAL: AXOX3, NAD, well dressed, answering questions appropriately, appears stated age.  HEENT: No exopthalmous, no proptosis, EOMI, no lig lag, no retraction  NECK: Thyroid normal in size, non tender, no nodules were palpated.  CV: RRR  LUNGS: CTAB  ABDOMEN: +BS  NEUROLOGY: CN grossly intact, no tremors  PSYCH: normal affect and mood      LABS:  A1c:  Lab Results   Component Value Date    A1C 7.0 02/03/2020    A1C 7.6 03/20/2018    A1C 8.0 06/15/2017    A1C 8.5 12/01/2016    A1C 7.8 06/13/2016     BMP:  Creatinine   Date Value Ref Range Status   11/07/2019 0.85 0.70 - 1.18 mg/dL Final       Urine Micro:  Lab " Results   Component Value Date    UMALCR 5 06/15/2017        LFTs/Lipids:  Recent Labs   Lab Test 06/15/17 12/01/16  0845 05/03/12  1046   CHOL 175 222* 204*   HDL 46 43 51    137* 124   TRIG 127 211* 143   CHOLHDLRATIO 3.8  --  4.0       TFTs:  Lab Results   Component Value Date    TSH 1.67 06/15/2017       Blood Glucose and pump data/ Meter reviewed.     All pertinent notes, labs, and images personally reviewed by me.     A/P  Mr.Christopher ANTONIO Gibbs is a 26 year old here for the evaluation/management of diabetes:    1. DM1 - Under Poor control.  A1c 7.0%.  No complications associated with diabetes.  In general blood sugars are in acceptable range though limited blood sugar data is available.  No major episodes of hypoglycemia.  Plan:  Need more data- recommend diagnostic CGM which was placed by CDE.  Briefly discussed Medtronic 670 G pump  Also discussed Dexcom if he wants to continue with tandem  He will check cost with insurance  Referral to diabetic educator for further education about the newer pumps  Repeat labs and follow-up in 3 months.      2. Hypertension - Under Fair  control.  Takes lisinopril 10 mg      3. Hyperlipidemia - not on medication.  No recent data to review.  -- get lipid panel in next labs.  4. Prevention  Opthalmology: Due for eye examination  ASA-no 2/2 to age  Smoking- no    Most Recent Immunizations   Administered Date(s) Administered     Flu, Unspecified 10/12/2019     HEPA 11/01/2008     HepA-Adult 06/03/2019     HepB 01/09/2004     Hib (PRP-T) 05/27/1992     Historical DTP/aP 01/01/1996     Influenza (H1N1) 12/08/2009     Influenza (IIV3) PF 10/17/2012     Influenza Vaccine IM > 6 months Valent IIV4 01/07/2019     MMR 11/14/2003     OPV, trivalent, live 12/13/1995     Pneumococcal 23 valent 11/18/2017     TD (ADULT, 7+) 11/14/2003     Tdap (Adacel,Boostrix) 11/11/2014     Recommend checking blood sugars before meals and at bedtime.    If Blood glucose are low more often->  2-3 times/week- give us a call.  The patient is advised to Make better food choices: reduce carbs, Reduce portion size, weight loss and exercise 3-4 times a week.  Discussed hypoglycemia signs and symptoms as well as management in detail.      Follow-up:  Follow up in 3 month(s)    Bridgette Katz MD  Endocrinology   [facility]  February 4, 2020  CC: Jacey Mon    All questions were answered.  The patient indicates understanding of the above issues and agrees with the plan set forth.     Disclaimer: This note consists of symbols derived from keyboarding, dictation and/or voice recognition software. As a result, there may be errors in the script that have gone undetected. Please consider this when interpreting information found in this chart.        Again, thank you for allowing me to participate in the care of your patient.        Sincerely,        Bridgette Katz MD

## 2020-02-04 NOTE — NURSING NOTE
ENDOCRINOLOGY INTAKE FORM    Patient Name:  Gonzalez Gibbs  :  1990    Is patient Diabetic?   Yes: type 1  Does patient have non-diabetic or other endocrine issues?  Yes: obesity    Vitals: There were no vitals taken for this visit.  BMI= There is no height or weight on file to calculate BMI.    Flu vaccine:  Yes: 10/12/19  Pneumonia vaccine:  Yes: pneumonvax    Smoking and Alcohol use:  Social History     Tobacco Use     Smoking status: Never Smoker     Smokeless tobacco: Never Used   Substance Use Topics     Alcohol use: Yes     Alcohol/week: 0.0 standard drinks     Drug use: No       Foot Exam: No  Eye Exam:  No  Dental Exam:  Yes: 6 mos  Aspirin Use:  No    Lab Results   Component Value Date    A1C 7.0 2020    A1C 7.6 2018    A1C 8.0 06/15/2017    A1C 8.5 2016    A1C 7.8 2016       Lab Results   Component Value Date    MICROL 6 2016     No results found for: MICROALBUMIN    LEONEL Ferraro Endocrinology  [place]

## 2020-02-10 ENCOUNTER — TELEPHONE (OUTPATIENT)
Dept: ENDOCRINOLOGY | Facility: CLINIC | Age: 30
End: 2020-02-10

## 2020-02-10 NOTE — TELEPHONE ENCOUNTER
Received records from [facility].    Estrella Laura, LEONEL  [facility]  [place]

## 2020-02-27 ENCOUNTER — MYC MEDICAL ADVICE (OUTPATIENT)
Dept: ENDOCRINOLOGY | Facility: CLINIC | Age: 30
End: 2020-02-27

## 2020-02-28 ENCOUNTER — TELEPHONE (OUTPATIENT)
Dept: ENDOCRINOLOGY | Facility: CLINIC | Age: 30
End: 2020-02-28

## 2020-02-28 NOTE — TELEPHONE ENCOUNTER
Patient sent a [employer] message stating a medical necessity form was sent by [employer] and requesting the form to be sent so patient can get insulin supplies sent to him.   There is a telephone encounter dated 12/27/19 for a medical necessity form sent from [employer]. The form was faxed faxed and sent to scanning 1/2/20. This writer is not able to see the form as being scanned in. Message sent to Endo MA to see if there is a file with the form in it.   Bette Jaime RN

## 2020-02-28 NOTE — TELEPHONE ENCOUNTER
Patient sent a [employer] message stating that the form was marked as urgent and sent on 2/27/20.    Routed message to Cyndy FLOWERS

## 2020-03-02 ENCOUNTER — MEDICAL CORRESPONDENCE (OUTPATIENT)
Dept: HEALTH INFORMATION MANAGEMENT | Facility: CLINIC | Age: 30
End: 2020-03-02

## 2020-03-02 NOTE — TELEPHONE ENCOUNTER
Form was faxed, copied, sent to scanning.      Estrella Laura, [facility]  [place]

## 2020-03-02 NOTE — TELEPHONE ENCOUNTER
Form was faxed, copied, sent to scanning.      Estrella Laura, [facility]  [place]/[place]

## 2020-03-02 NOTE — TELEPHONE ENCOUNTER
LAST OFFICE/VIRTUAL VISIT:  02/04/20    FUTURE OFFICE/VIRTUAL VISIT:  05/05/20    Lab Results   Component Value Date    A1C 7.0 02/03/2020    A1C 7.6 03/20/2018    A1C 8.0 06/15/2017    A1C 8.5 12/01/2016    A1C 7.8 06/13/2016         Estrella Laura CMA  [place] [facility]  [place]

## 2020-03-02 NOTE — TELEPHONE ENCOUNTER
Forms/paperwork reviewed, completed and signed.  Please fax or send the papers as requested, document in chart and close the encounter.    Thank you.    Bridgette Katz MD

## 2020-03-04 ENCOUNTER — MYC MEDICAL ADVICE (OUTPATIENT)
Dept: ENDOCRINOLOGY | Facility: CLINIC | Age: 30
End: 2020-03-04

## 2020-03-04 NOTE — TELEPHONE ENCOUNTER
Per patient's MyChart message is wondering when form was faxed per MyChart encounter and telephone encounter 2/27/20-2/28/20 Form was faxed March 2, at 420pm.

## 2020-03-05 ENCOUNTER — MEDICAL CORRESPONDENCE (OUTPATIENT)
Dept: HEALTH INFORMATION MANAGEMENT | Facility: CLINIC | Age: 30
End: 2020-03-05

## 2020-03-05 NOTE — TELEPHONE ENCOUNTER
SAQIB Russ received a call that [employer] hasn't received the form.  I asked that they send another form for signature.    Form resigned and refaxed.    Form was faxed, copied, sent to scanning.      Estrella Laura, [facility]  [place]

## 2020-03-07 DIAGNOSIS — E10.9 TYPE 1 DIABETES MELLITUS WITHOUT COMPLICATION (H): ICD-10-CM

## 2020-03-10 NOTE — TELEPHONE ENCOUNTER
Received incoming refill request for   Pending Prescriptions:                       Disp   Refills    NOVOLOG VIAL 100 UNIT/ML soln [Pharmacy M*40 mL  3            Sig: ADMINISTER UP  UNITS VIA INSULIN PUMP EVERY           DAY AS DIRECTED    Patient last had a refill of this medication on 2/4/2020. Patient sees endocrinology for this and refill requests should go to Dr. Katz.

## 2020-03-19 ENCOUNTER — VIRTUAL VISIT (OUTPATIENT)
Dept: FAMILY MEDICINE | Facility: OTHER | Age: 30
End: 2020-03-19

## 2020-03-19 NOTE — PROGRESS NOTES
"Date: 2020 13:24:55  Clinician: Ada Alatorre  Clinician NPI: [id number]  Patient: Gonzalez Gibbs  Patient : 1990  Patient Address: [address] [place] [address]  Patient Phone: [phone]  Visit Protocol: URI  Patient Summary:  Gonzalez is a 30 year old ( : 1990 ) male who initiated a Visit for COVID-19 (Coronavirus) evaluation and screening. When asked the question \"Please sign me up to receive news, health information and promotions. \", Gonzalez responded \"No\".    Gonzalez states his symptoms started gradually 2-3 weeks ago.   His symptoms consist of a cough, nasal congestion, rhinitis, and facial pain or pressure.   Symptom details     Nasal secretions: The color of his mucus is clear.    Cough: Gonzalez coughs a few times an hour and his cough is more bothersome at night. Phlegm comes into his throat when he coughs. He believes his cough is caused by post-nasal drip. The color of the phlegm is clear.     Facial pain or pressure: The facial pain or pressure feels worse when bending over or leaning forward.      Gonzalez denies having sore throat, malaise, headache, fever, ear pain, enlarged lymph nodes, myalgias, chills, teeth pain, and wheezing. He also denies having recent facial or sinus surgery in the past 60 days, double sickening (worsening symptoms after initial improvement), and taking antibiotic medication for the symptoms. He is not experiencing dyspnea.   Precipitating events  He has not recently been exposed to someone with influenza. Gonzalez has been in close contact with the following high risk individuals: people with asthma, heart disease or diabetes and adults 65 or older.   Pertinent COVID-19 (Coronavirus) information  Gonzalez has not traveled internationally or to the areas where COVID-19 (Coronavirus) is widespread, including cruise ship travel in the last 14 days before the start of his symptoms.   Gonzalez has not " had a close contact with a laboratory-confirmed COVID-19 patient within 14 days of symptom onset. He also has not had a close contact with a suspected COVID-19 patient within 14 days of symptom onset.   Gonzalez is not a healthcare worker and does not work in a healthcare facility.   Pertinent medical history  Gonzalez does not need a return to work/school note.   Weight: 270 lbs   Gonzalez does not smoke or use smokeless tobacco.   Additional information as reported by the patient (free text): I have been monitoring my temp. Always between 97.1 and 98.5. The facial pressure feels like full sinuses. Had what I suspect as a sinus infection about a month ago and it went away for a little bit but I had a slight cough the whole time. The cough has become more pronounced and my nose is more congested.   Weight: 270 lbs    MEDICATIONS: metformin oral, escitalopram oxalate oral, lisinopril-hydrochlorothiazide oral, Novolog U-100 Insulin aspart subcutaneous, ALLERGIES: Sulfa (Sulfonamide Antibiotics)  Clinician Response:  Dear Gonzalez,    Based on the information you have provided, you do have symptoms that are consistent with Coronavirus (COVID-19).  The coronavirus causes mild to severe respiratory illness with the most common symptoms including fever, cough and difficulty breathing. Unfortunately, many viruses cause similar symptoms and it can be difficult to distinguish between viruses, especially in mild cases, so we are presuming that anyone with cough or fever has coronavirus at this time.  Coronavirus/COVID-19 has reached the point of community spread in [place], meaning that we are finding the virus in people with no known exposure risk for jere the virus. Given the increasing commonness of coronavirus in the community we are no longer testing patients who are not critically ill.  If you are a health care worker, you should refer to your employee health office for instructions about returning  to work.  For everyone else who has cough or fever, you should assume you are infected with coronavirus. Accordingly, you should self-quarantine for seven days from the first day your symptoms started OR 72 hours after your cough and fever completely resolve - WHICHEVER is LONGER. You should call if you find increasing shortness of breath, wheezing or sustained fever above 101.5. If you are significantly short of breath or experience chest pain you should call 911 or report to the nearest emergency department for urgent evaluation.    Isolate yourself at home.   Do Not allow any visitors  Do Not go to work or school  Do Not go to [employer],  centers, shopping, or other public places.  Do Not shake hands.  Avoid close contact with others (hugging, kissing).   Protect Others:    Cover Your Mouth and Nose with a mask, disposable tissue or wash cloth to avoid spreading germs to others.  Wash your hands and face frequently with soap and water.   If you develop significant shortness of breath that prevents you from doing normal activities, please call 911 or proceed to the nearest emergency room and alert them immediately that you have been in self-isolation for possible coronavirus.   For more information about COVID19 and options for caring for yourself at home, please visit the CDC website at https://www.cdc.gov/coronavirus/2019-ncov/about/steps-when-sick.htmlFor more options for care at [facility], please visit our website at [email or web address]       Diagnosis: Cough  Diagnosis ICD: R05  Prescription: fluticasone propionate (Flonase Allergy Relief) 50 mcg/actuation nasal spray,suspension 1 60 spray aerosol with adapter, 14 days supply. Inhale 1 spray in each nostril intranasally 1 time per day for 14 days as needed. Refills: 0, Refill as needed: no, Allow substitutions: yes  Prescription: doxycycline hyclate (Vibramycin) 100 mg oral capsule 20 capsule, 10 days supply. Take 1  capsule by mouth 2 times per day for 10 days. Refills: 0, Refill as needed: no, Allow substitutions: yes

## 2020-03-23 ENCOUNTER — MYC MEDICAL ADVICE (OUTPATIENT)
Dept: ENDOCRINOLOGY | Facility: CLINIC | Age: 30
End: 2020-03-23

## 2020-03-23 DIAGNOSIS — E10.9 TYPE 1 DIABETES MELLITUS WITHOUT COMPLICATION (H): Primary | ICD-10-CM

## 2020-04-27 ENCOUNTER — MYC MEDICAL ADVICE (OUTPATIENT)
Dept: ENDOCRINOLOGY | Facility: CLINIC | Age: 30
End: 2020-04-27

## 2020-04-27 NOTE — TELEPHONE ENCOUNTER
appt changed.    Message sent via [employer].      [facility]  [facility]

## 2020-05-13 ENCOUNTER — MYC MEDICAL ADVICE (OUTPATIENT)
Dept: ENDOCRINOLOGY | Facility: CLINIC | Age: 30
End: 2020-05-13

## 2020-05-15 ENCOUNTER — MYC MEDICAL ADVICE (OUTPATIENT)
Dept: ENDOCRINOLOGY | Facility: CLINIC | Age: 30
End: 2020-05-15

## 2020-05-15 ENCOUNTER — TELEPHONE (OUTPATIENT)
Dept: ENDOCRINOLOGY | Facility: CLINIC | Age: 30
End: 2020-05-15

## 2020-05-18 NOTE — TELEPHONE ENCOUNTER
Detail Level: [facility] staff- can you please take a look into this?  Thank you.     Detail Level: Simple

## 2020-05-18 NOTE — TELEPHONE ENCOUNTER
LAST OFFICE/VIRTUAL VISIT:  02/04/20    FUTURE OFFICE/VIRTUAL VISIT:  None    Lab Results   Component Value Date    A1C 7.0 02/03/2020    A1C 7.6 03/20/2018    A1C 8.0 06/15/2017    A1C 8.5 12/01/2016    A1C 7.8 06/13/2016         Estrella Laura CMA  [facility]  [facility]

## 2020-05-21 NOTE — TELEPHONE ENCOUNTER
Form was faxed, copied, sent to scanning.      Estrella Laura, [facility]  [facility]

## 2020-05-21 NOTE — TELEPHONE ENCOUNTER
Form was faxed, copied, sent to scanning.      Estrella Laura, [facility]  [facility]

## 2020-05-22 ENCOUNTER — MYC MEDICAL ADVICE (OUTPATIENT)
Dept: ENDOCRINOLOGY | Facility: CLINIC | Age: 30
End: 2020-05-22

## 2020-06-06 DIAGNOSIS — I10 BENIGN ESSENTIAL HYPERTENSION: ICD-10-CM

## 2020-06-08 RX ORDER — LISINOPRIL AND HYDROCHLOROTHIAZIDE 12.5; 2 MG/1; MG/1
TABLET ORAL
Qty: 90 TABLET | Refills: 1 | COMMUNITY
Start: 2020-06-08

## 2020-06-08 NOTE — TELEPHONE ENCOUNTER
Gonzalez Gibbs is requesting a refill of:    Refused Prescriptions:                       Disp   Refills    lisinopril-hydrochlorothiazide (ZESTORETIC*90 tab*1        Sig: TAKE 1 TABLET BY MOUTH EVERY DAY  Refused By: ELIESER PALACIOS  Reason for Refusal: Patient needs appointment

## 2020-06-13 ENCOUNTER — MYC REFILL (OUTPATIENT)
Dept: FAMILY MEDICINE | Facility: CLINIC | Age: 30
End: 2020-06-13

## 2020-06-13 ENCOUNTER — MYC MEDICAL ADVICE (OUTPATIENT)
Dept: FAMILY MEDICINE | Facility: CLINIC | Age: 30
End: 2020-06-13

## 2020-06-13 DIAGNOSIS — I10 BENIGN ESSENTIAL HYPERTENSION: ICD-10-CM

## 2020-06-15 RX ORDER — LISINOPRIL AND HYDROCHLOROTHIAZIDE 12.5; 2 MG/1; MG/1
1 TABLET ORAL DAILY
Qty: 70 TABLET | Refills: 1 | COMMUNITY
Start: 2020-06-15 | End: 2020-06-17

## 2020-06-15 NOTE — TELEPHONE ENCOUNTER
Gonzalez Gibbs is requesting a refill of:    Signed Prescriptions:                        Disp   Refills    lisinopril-hydrochlorothiazide (ZESTORETIC*70 tab*1        Sig: Take 1 tablet by mouth daily  Authorizing Provider: DIEUDONNE SANTANA  Ordering User: PAUL KINGSLEY

## 2020-06-17 ENCOUNTER — OFFICE VISIT (OUTPATIENT)
Dept: FAMILY MEDICINE | Facility: CLINIC | Age: 30
End: 2020-06-17

## 2020-06-17 VITALS
HEART RATE: 84 BPM | WEIGHT: 290.8 LBS | SYSTOLIC BLOOD PRESSURE: 134 MMHG | RESPIRATION RATE: 20 BRPM | BODY MASS INDEX: 43.07 KG/M2 | TEMPERATURE: 97 F | HEIGHT: 69 IN | DIASTOLIC BLOOD PRESSURE: 82 MMHG

## 2020-06-17 DIAGNOSIS — F41.1 GENERALIZED ANXIETY DISORDER: ICD-10-CM

## 2020-06-17 DIAGNOSIS — I10 BENIGN ESSENTIAL HYPERTENSION: ICD-10-CM

## 2020-06-17 DIAGNOSIS — E10.9 TYPE 1 DIABETES MELLITUS WITHOUT COMPLICATION (H): Primary | ICD-10-CM

## 2020-06-17 LAB
BUN SERPL-MCNC: 16 MG/DL (ref 7–25)
BUN/CREATININE RATIO: 17.6 (ref 6–22)
CALCIUM SERPL-MCNC: 9.5 MG/DL (ref 8.6–10.3)
CHLORIDE SERPLBLD-SCNC: 99.3 MMOL/L (ref 98–110)
CHOLEST SERPL-MCNC: 209 MG/DL (ref 0–199)
CHOLEST/HDLC SERPL: 4 {RATIO} (ref 0–5)
CO2 SERPL-SCNC: 26.5 MMOL/L (ref 20–32)
CREAT SERPL-MCNC: 0.91 MG/DL (ref 0.7–1.18)
GLUCOSE SERPL-MCNC: 249 MG/DL (ref 60–99)
HBA1C MFR BLD: 7 % (ref 4–7)
HDLC SERPL-MCNC: 53 MG/DL (ref 40–150)
LDLC SERPL CALC-MCNC: 121 MG/DL (ref 0–130)
POTASSIUM SERPL-SCNC: 5.38 MMOL/L (ref 3.5–5.3)
SODIUM SERPL-SCNC: 136.5 MMOL/L (ref 135–146)
TRIGL SERPL-MCNC: 174 MG/DL (ref 0–149)

## 2020-06-17 PROCEDURE — 36415 COLL VENOUS BLD VENIPUNCTURE: CPT | Performed by: PHYSICIAN ASSISTANT

## 2020-06-17 PROCEDURE — 80048 BASIC METABOLIC PNL TOTAL CA: CPT | Performed by: PHYSICIAN ASSISTANT

## 2020-06-17 PROCEDURE — 99213 OFFICE O/P EST LOW 20 MIN: CPT | Performed by: PHYSICIAN ASSISTANT

## 2020-06-17 PROCEDURE — 80061 LIPID PANEL: CPT | Performed by: PHYSICIAN ASSISTANT

## 2020-06-17 PROCEDURE — 83036 HEMOGLOBIN GLYCOSYLATED A1C: CPT | Performed by: PHYSICIAN ASSISTANT

## 2020-06-17 RX ORDER — LISINOPRIL AND HYDROCHLOROTHIAZIDE 12.5; 2 MG/1; MG/1
1 TABLET ORAL DAILY
Qty: 90 TABLET | Refills: 1 | Status: SHIPPED | OUTPATIENT
Start: 2020-06-17 | End: 2020-12-10

## 2020-06-17 RX ORDER — ESCITALOPRAM OXALATE 10 MG/1
10 TABLET ORAL DAILY
Qty: 90 TABLET | Refills: 1 | Status: SHIPPED | OUTPATIENT
Start: 2020-06-17 | End: 2020-12-15

## 2020-06-17 ASSESSMENT — ANXIETY QUESTIONNAIRES
1. FEELING NERVOUS, ANXIOUS, OR ON EDGE: SEVERAL DAYS
GAD7 TOTAL SCORE: 5
6. BECOMING EASILY ANNOYED OR IRRITABLE: SEVERAL DAYS
5. BEING SO RESTLESS THAT IT IS HARD TO SIT STILL: NOT AT ALL
7. FEELING AFRAID AS IF SOMETHING AWFUL MIGHT HAPPEN: SEVERAL DAYS
IF YOU CHECKED OFF ANY PROBLEMS ON THIS QUESTIONNAIRE, HOW DIFFICULT HAVE THESE PROBLEMS MADE IT FOR YOU TO DO YOUR WORK, TAKE CARE OF THINGS AT HOME, OR GET ALONG WITH OTHER PEOPLE: SOMEWHAT DIFFICULT
3. WORRYING TOO MUCH ABOUT DIFFERENT THINGS: SEVERAL DAYS
2. NOT BEING ABLE TO STOP OR CONTROL WORRYING: NOT AT ALL

## 2020-06-17 ASSESSMENT — MIFFLIN-ST. JEOR: SCORE: 2270.47

## 2020-06-17 ASSESSMENT — PATIENT HEALTH QUESTIONNAIRE - PHQ9
5. POOR APPETITE OR OVEREATING: SEVERAL DAYS
SUM OF ALL RESPONSES TO PHQ QUESTIONS 1-9: 6

## 2020-06-17 NOTE — NURSING NOTE
Gonzalez Gibbs is here for a medication check and refill for BP.    Questioned patient about current smoking habits.  Pt. has never smoked.  PULSE regular  My Chart: active  CLASSIFICATION OF OVERWEIGHT AND OBESITY BY BMI                        Obesity Class           BMI(kg/m2)  Underweight                                    < 18.5  Normal                                         18.5-24.9  Overweight                                     25.0-29.9  OBESITY                     I                  30.0-34.9                             II                 35.0-39.9  EXTREME OBESITY             III                >40                            Patient's  BMI Body mass index is 43.26 kg/m .  http://hin.nhlbi.nih.gov/menuplanner/menu.cgi  Pre-visit planning  Immunizations - up to date  Colonoscopy -   Mammogram -   Asthma -   PHQ9 -    ANTONETTE-7 -

## 2020-06-17 NOTE — PROGRESS NOTES
"CC: Medication Check    History:  HTN:  Takes lisinopril-hydrochlorothiazide once daily. Denies any side effects. Does check at home and gets 120-130s/80s. Has been getting eye exams every year. No chest pain, palpations, SOB. Does try to go on 3-5 mile walks, 3-5 days per week. Admits that diet is not as healthy recently with Covid working from home. Has gained 9 lbs since 2/2020. He does try to get annual eye exams, but admits he is due.    Anxiety:  Takes escitalopram 10 mg daily. Seeing therapist 2-3 times per month. This was started 3 months ago. No side effects.     Type 1 Diabetes:   Managed by [facility].     PMH, MEDICATIONS, ALLERGIES, SOCIAL AND FAMILY HISTORY in [facility] and reviewed by me personally.    ROS negative other than the symptoms noted above in the HPI.      Examination   /82 (BP Location: Left arm, Patient Position: Chair, Cuff Size: Adult Large)   Pulse 84   Temp 97  F (36.1  C)   Resp 20   Ht 1.746 m (5' 8.75\")   Wt 131.9 kg (290 lb 12.8 oz)   BMI 43.26 kg/m         Constitutional: Sitting comfortably, in no acute distress. Vital signs noted  Eyes: pupils equal round reactive to light and accomodation, extra ocular movements intact  Neck:  no adenopathy, trachea midline and normal to palpation  Cardiovascular:  regular rate and rhythm, no murmurs, clicks, or gallops  Respiratory:  normal respiratory rate and rhythm, lungs clear to auscultation  SKIN: No jaundice/pallor/rash.   Psychiatric: mentation appears normal and affect normal/bright        A/P    ICD-10-CM    1. Type 1 diabetes mellitus without complication (H)  E10.9 VENOUS COLLECTION     Hemoglobin A1c (BFP)     Lipid Panel (BFP)   2. Generalized anxiety disorder  F41.1 escitalopram (LEXAPRO) 10 MG tablet   3. Benign essential hypertension  I10 lisinopril-hydrochlorothiazide (ZESTORETIC) 20-12.5 MG tablet     VENOUS COLLECTION     Basic Metabolic Panel (BFP)       DISCUSSION:  HTN:  BP is improved today. No " changes made to medication. Will recheck BMP, lipid profile, and send MyChart with results. Encouraged him to work on healthy diet and exercise with goal of weight loss.     Anxiety:  ANTONETTE-7 and PHQ-9 show good control. Refilled escitalopram at same dose for 6 months.    follow up visit: 6 months    Verito Lucio PA-C  [facility]

## 2020-06-18 ASSESSMENT — ANXIETY QUESTIONNAIRES: GAD7 TOTAL SCORE: 5

## 2020-06-29 DIAGNOSIS — E10.9 TYPE 1 DIABETES MELLITUS WITHOUT COMPLICATION (H): ICD-10-CM

## 2020-07-01 ENCOUNTER — MYC REFILL (OUTPATIENT)
Dept: ENDOCRINOLOGY | Facility: CLINIC | Age: 30
End: 2020-07-01

## 2020-07-01 DIAGNOSIS — E10.9 TYPE 1 DIABETES MELLITUS WITHOUT COMPLICATION (H): ICD-10-CM

## 2020-07-01 NOTE — TELEPHONE ENCOUNTER
Pending Prescriptions:                       Disp   Refills    NOVOLOG VIAL 100 UNIT/ML soln [Pharmacy Me*40 mL  3        Sig: ADMINISTER UP  UNITS VIA INSULIN PUMP EVERY DAY           AS DIRECTED    Routing refill request to provider for review/approval because:  Patient is due for an appointment

## 2020-07-01 NOTE — TELEPHONE ENCOUNTER
Pending Prescriptions:                       Disp   Refills    NOVOLOG VIAL 100 UNIT/ML soln             40 mL  0            Sig: ADMINISTER UP  UNITS VIA INSULIN PUMP EVERY           DAY AS DIRECTED    Prescription approved per [facility] Refill Protocol.    Patient has a future appointment scheduled

## 2020-07-13 ENCOUNTER — MYC MEDICAL ADVICE (OUTPATIENT)
Dept: ENDOCRINOLOGY | Facility: CLINIC | Age: 30
End: 2020-07-13

## 2020-07-13 NOTE — TELEPHONE ENCOUNTER
Message sent via [employer].      Estrella Laura CMA  [facility]  [facility]

## 2020-07-23 DIAGNOSIS — E10.9 TYPE 1 DIABETES MELLITUS WITHOUT COMPLICATION (H): ICD-10-CM

## 2020-07-23 DIAGNOSIS — E10.65 UNCONTROLLED TYPE 1 DIABETES MELLITUS WITH HYPERGLYCEMIA (H): ICD-10-CM

## 2020-07-23 LAB — HBA1C MFR BLD: 7.5 % (ref 0–5.6)

## 2020-07-23 PROCEDURE — 83036 HEMOGLOBIN GLYCOSYLATED A1C: CPT | Performed by: INTERNAL MEDICINE

## 2020-07-23 PROCEDURE — 36415 COLL VENOUS BLD VENIPUNCTURE: CPT | Performed by: INTERNAL MEDICINE

## 2020-07-23 PROCEDURE — 82565 ASSAY OF CREATININE: CPT | Performed by: INTERNAL MEDICINE

## 2020-07-23 PROCEDURE — 80061 LIPID PANEL: CPT | Performed by: INTERNAL MEDICINE

## 2020-07-23 PROCEDURE — 82043 UR ALBUMIN QUANTITATIVE: CPT | Performed by: INTERNAL MEDICINE

## 2020-07-24 LAB
CHOLEST SERPL-MCNC: 199 MG/DL
CREAT SERPL-MCNC: 0.7 MG/DL (ref 0.66–1.25)
CREAT UR-MCNC: 24 MG/DL
GFR SERPL CREATININE-BSD FRML MDRD: >90 ML/MIN/{1.73_M2}
HDLC SERPL-MCNC: 47 MG/DL
LDLC SERPL CALC-MCNC: 127 MG/DL
MICROALBUMIN UR-MCNC: <5 MG/L
MICROALBUMIN/CREAT UR: NORMAL MG/G CR (ref 0–17)
NONHDLC SERPL-MCNC: 152 MG/DL
TRIGL SERPL-MCNC: 124 MG/DL

## 2020-07-28 ENCOUNTER — VIRTUAL VISIT (OUTPATIENT)
Dept: ENDOCRINOLOGY | Facility: CLINIC | Age: 30
End: 2020-07-28
Payer: COMMERCIAL

## 2020-07-28 ENCOUNTER — MYC MEDICAL ADVICE (OUTPATIENT)
Dept: ENDOCRINOLOGY | Facility: CLINIC | Age: 30
End: 2020-07-28

## 2020-07-28 DIAGNOSIS — E10.9 TYPE 1 DIABETES MELLITUS WITHOUT COMPLICATION (H): Primary | ICD-10-CM

## 2020-07-28 DIAGNOSIS — E10.65 UNCONTROLLED TYPE 1 DIABETES MELLITUS WITH HYPERGLYCEMIA (H): ICD-10-CM

## 2020-07-28 PROCEDURE — 99214 OFFICE O/P EST MOD 30 MIN: CPT | Mod: 95 | Performed by: INTERNAL MEDICINE

## 2020-07-28 RX ORDER — IBUPROFEN 600 MG/1
1 TABLET ORAL PRN
Qty: 1 MG | Refills: 0 | Status: SHIPPED | OUTPATIENT
Start: 2020-07-28 | End: 2024-04-16

## 2020-07-28 NOTE — PROGRESS NOTES
"THIS IS A VIDEO VISIT:    Phone call visit/virtual visit encounter:    Name of patient: Gonzalez Gibbs    Date of encounter: 7/28/2020    Time of start of video visit: 4:07    Video started: 4;11    Video ended: 4:32    Time visit video ended: 4:36    Provider location: working from [place]/ [facility]    Patient location: patients home.    Mode of transmission: video/ [employer]    Verbal consent: obtained before starting visit. Pt is agreeable.      The patient has been notified of following:      \"This VIDEO visit will be conducted via a call between you and your physician/provider. We have found that certain health care needs can be provided without the need for a physical exam.  This service lets us provide the care you need with a short phone conversation.  If a prescription is necessary we can send it directly to your pharmacy.  If lab work is needed we can place an order for that and you can then stop by our lab to have the test done at a later time.     With new updates with corona virus patient might be billed as clinic visit.     If during the course of the call the physician/provider feels a telephone visit is not appropriate, you will not be charged for this service.\"      Past medical history, social history, family history, allergy and medications were reviewed and updated as appropriate.  Reviewed pertinent labs, notes, imaging studies personally.    Endocrinology Clinic Note:  Name: Gonzalez Gibbs  Seen for Diabetes f/u.   HPI:  Gonzalez Gibbs is a 30 year old male who presents for the evaluation/management of type 1 diabetes.  Was getting care at [facility] [place] since last 2016 visit. Records requested.  Was On insulin pump for last 10-12 years.  Now using TANDEM insulin pump. Using Novolog in pump.  On this pump X 4 years.    Had diagnostic CGM in past but he was not able to bring it back. He lost it.       1. Type 1 DM:  Orginally diagnosed at the age " of: 13 years   Hospitalization for DKA: None  Current Regimen: Insulin pump and metformin 1000 mg BID  BS checks: 3-4 times a day  Average Meter Download: 169 with Sd  70. Not able to download meter.  Blood sugars are mostly in acceptable range.  No major episodes of hypoglycemia noted or reported.    Exercise: Few times a week  Last A1c: 7.0%  Episodes of hypoglycemia (low blood sugar): Occasional.  Gets symptoms  Fixed meal pattern: Yes  Patient counting carbs: Yes  Using PUMP: Yes.   Current Regimen:   Insulin pump -   Time Rate (U/hr)   0000-  2.470   0700  2.400   1800  2.5   2100  2.55     Carbohydrate Ratio -    Time Ratio   0000-  3   0700  1800  2100  2   2   3     Sensitivity   18   Active Insulin Time   hours   Basal    52% (54.8 Units)   Bolus     48% (51 Units)   Total Carbohydrates/day    205   Total Insulin/day     105 + 30   Average Blood Sugar                  DM Complications:   Nephropathy: No  Retinopathy: No   Neuropathy: No  Microalbuminuria: No  CAD/PAD: No  Gastroparesis: No  Hypoglycemia unawareness: No     2. Hypertension: Blood Pressure today:   BP Readings from Last 3 Encounters:   06/17/20 134/82   02/04/20 133/79   01/15/20 120/74      Blood pressure medications include lisinopril 10 mg.    3. Hyperlipidemia:  Not on medication.     PMH/PSH:  Past Medical History:   Diagnosis Date     Family history of diabetes mellitus      Mild intermittent asthma     EIA     Mild intermittent asthma     EIA     Type I diabetes mellitus, uncontrolled (H) 6/13/2016     Past Surgical History:   Procedure Laterality Date     HC CREATE EARDRUM OPENING,GEN ANESTH      P.E. Tubes/ 4-5 surgeries, annual from age 10)     HC REPAIR ING HERNIA,6MO-5YR,REDUC  1992    also umbilical hernia     Family Hx:  Family History   Problem Relation Age of Onset     Hypertension Father      Depression Mother      Diabetes Paternal Uncle      Diabetes Other         2nd cousins     Cancer No family hx of      Heart Disease No  family hx of            DM2:             Social Hx:  Social History     Socioeconomic History     Marital status: Single     Spouse name: Not on file     Number of children: Not on file     Years of education: Not on file     Highest education level: Not on file   Occupational History     Occupation: [employer]   Social Needs     Financial resource strain: Not on file     Food insecurity     Worry: Not on file     Inability: Not on file     Transportation needs     Medical: Not on file     Non-medical: Not on file   Tobacco Use     Smoking status: Never Smoker     Smokeless tobacco: Never Used   Substance and Sexual Activity     Alcohol use: Yes     Alcohol/week: 0.0 standard drinks     Drug use: No     Sexual activity: Not Currently   Lifestyle     Physical activity     Days per week: Not on file     Minutes per session: Not on file     Stress: Not on file   Relationships     Social connections     Talks on phone: Not on file     Gets together: Not on file     Attends Christian service: Not on file     Active member of club or organization: Not on file     Attends meetings of clubs or organizations: Not on file     Relationship status: Not on file     Intimate partner violence     Fear of current or ex partner: Not on file     Emotionally abused: Not on file     Physically abused: Not on file     Forced sexual activity: Not on file   Other Topics Concern      Service Not Asked     Blood Transfusions Not Asked     Caffeine Concern Not Asked     Occupational Exposure Not Asked     Hobby Hazards Not Asked     Sleep Concern Not Asked     Stress Concern Not Asked     Weight Concern Not Asked     Special Diet Not Asked     Back Care Not Asked     Exercise Yes     Bike Helmet Not Asked     Seat Belt Yes     Self-Exams Not Asked     Parent/sibling w/ CABG, MI or angioplasty before 65F 55M? Not Asked   Social History Narrative     Not on file          MEDICATIONS:  has a current medication list which includes the  following prescription(s): acetaminophen, blood glucose, blood glucose monitoring, escitalopram, glucagon, insulin glargine, insulin syringe-needle u-100, lisinopril-hydrochlorothiazide, metformin, novolog vial, and order for dme.    ROS     ROS: 10 point ROS neg other than the symptoms noted above in the HPI.    Physical Exam   VS: There were no vitals taken for this visit.   GENERAL: healthy, alert and no distress  EYES: Eyes grossly normal to inspection, conjunctivae and sclerae normal  RESP: no audible wheeze, cough, or visible cyanosis.  No visible retractions or increased work of breathing.  Able to speak fully in complete sentences.  NEURO: Cranial nerves grossly intact, mentation intact and speech normal  PSYCH: mentation appears normal, affect normal/bright, judgement and insight intact, normal speech and appearance well-groomed.    LABS:  A1c:  Lab Results   Component Value Date    A1C 7.5 07/23/2020    A1C 7.0 06/17/2020    A1C 7.0 02/03/2020    A1C 7.6 03/20/2018    A1C 8.0 06/15/2017       BMP:  Creatinine   Date Value Ref Range Status   07/23/2020 0.70 0.66 - 1.25 mg/dL Final       Urine Micro:  Lab Results   Component Value Date    UMALCR Unable to calculate due to low value 07/23/2020        LFTs/Lipids:  Recent Labs   Lab Test 07/23/20  1147 06/17/20 06/15/17   CHOL 199 209* 175   HDL 47 53 46   * 121 104   TRIG 124 174* 127   CHOLHDLRATIO  --  4 3.8       TFTs:  TSH   Date Value Ref Range Status   06/15/2017 1.67 0.30 - 5.0 mcU/mL Final     Blood Glucose and pump data/ Meter reviewed.     All pertinent notes, labs, and images personally reviewed by me.     A/P  Mr.Christopher ANTONIO Gibbs is a 30 year old here for the evaluation/management of diabetes:    1. DM1 - Under Poor control.  A1c 7.5%.  No complications associated with diabetes.  BG high.  No major episodes of hypoglycemia noted/reported.   Plan:  Discussed diagnosis, pathophysiology, management and treatment options of condition with  pt.  Briefly discussed Medtronic 670 G pump  Also discussed Dexcom if he wants to continue with tandem-- he will start the process.  Increase basal rate and I:C ratio as noted below.  Close follow up in 6 weeks.  Repeat labs and follow-up in 3 months.    Time Rate (U/hr)   0000-  2.470   0700  2.400-->2.5   1800  2.5   2100  2.55       Time Ratio   0000-  3   0700  1800  2100  2-->1.8   2   3     2. Hypertension - Takes lisinopril 10 mg      3. Hyperlipidemia - Not on medication.    Discussed diagnosis, pathophysiology, management and treatment options of condition with pt.  Recommend strict BG control  Repeat FLP in 3 months  Consider starting medication after that.  4. Prevention  Opthalmology: Due for eye examination  ASA-no 2/2 to age  Smoking- no    Most Recent Immunizations   Administered Date(s) Administered     Flu, Unspecified 10/12/2019     HEPA 11/01/2008     HepA-Adult 06/03/2019     HepB 01/09/2004     Hib (PRP-T) 05/27/1992     Historical DTP/aP 01/01/1996     Influenza (H1N1) 12/08/2009     Influenza (IIV3) PF 10/17/2012     Influenza Vaccine IM > 6 months Valent IIV4 01/07/2019     MMR 11/14/2003     OPV, trivalent, live 12/13/1995     Pneumococcal 23 valent 11/18/2017     TD (ADULT, 7+) 11/14/2003     Tdap (Adacel,Boostrix) 11/11/2014     Recommend checking blood sugars before meals and at bedtime.    If Blood glucose are low more often-> 2-3 times/week- give us a call.  The patient is advised to Make better food choices: reduce carbs, Reduce portion size, weight loss and exercise 3-4 times a week.  Discussed hypoglycemia signs and symptoms as well as management in detail.      Follow-up:  Follow up in 6 weeks.    Bridgette Katz MD  Endocrinology   [facility]  7/28/2020  CC: Jacey Mon    All questions were answered.  The patient indicates understanding of the above issues and agrees with the plan set forth.     Disclaimer: This note consists of symbols derived  from keyboarding, dictation and/or voice recognition software. As a result, there may be errors in the script that have gone undetected. Please consider this when interpreting information found in this chart.

## 2020-07-28 NOTE — LETTER
"    7/28/2020         RE: Gonzalez Gibbs  [address]  [facility] [address]        Dear Colleague,    Thank you for referring your patient, Gonzalez Gibbs, to the [facility]. Please see a copy of my visit note below.    THIS IS A VIDEO VISIT:    Phone call visit/virtual visit encounter:    Name of patient: Gonzalez Gibbs    Date of encounter: 7/28/2020    Time of start of video visit: 4:07    Video started: 4;11    Video ended: 4:32    Time visit video ended: 4:36    Provider location: working from [place]    Patient location: patients home.    Mode of transmission: video/ Doximity    Verbal consent: obtained before starting visit. Pt is agreeable.      The patient has been notified of following:      \"This VIDEO visit will be conducted via a call between you and your physician/provider. We have found that certain health care needs can be provided without the need for a physical exam.  This service lets us provide the care you need with a short phone conversation.  If a prescription is necessary we can send it directly to your pharmacy.  If lab work is needed we can place an order for that and you can then stop by our lab to have the test done at a later time.     With new updates with corona virus patient might be billed as clinic visit.     If during the course of the call the physician/provider feels a telephone visit is not appropriate, you will not be charged for this service.\"      Past medical history, social history, family history, allergy and medications were reviewed and updated as appropriate.  Reviewed pertinent labs, notes, imaging studies personally.    Endocrinology Clinic Note:  Name: Gonzalez Gibbs  Seen for Diabetes f/u.   HPI:  Gonzalez Gibbs is a 30 year old male who presents for the evaluation/management of type 1 diabetes.  Was getting care at endocrinology clinic of [place] since last 2016 visit. Records " requested.  Was On insulin pump for last 10-12 years.  Now using TANDEM insulin pump. Using Novolog in pump.  On this pump X 4 years.    Had diagnostic CGM in past but he was not able to bring it back. He lost it.       1. Type 1 DM:  Orginally diagnosed at the age of: 13 years   Hospitalization for DKA: None  Current Regimen: Insulin pump and metformin 1000 mg BID  BS checks: 3-4 times a day  Average Meter Download: 169 with Sd  70. Not able to download meter.  Blood sugars are mostly in acceptable range.  No major episodes of hypoglycemia noted or reported.    Exercise: Few times a week  Last A1c: 7.0%  Episodes of hypoglycemia (low blood sugar): Occasional.  Gets symptoms  Fixed meal pattern: Yes  Patient counting carbs: Yes  Using PUMP: Yes.   Current Regimen:   Insulin pump -   Time Rate (U/hr)   0000-  2.470   0700  2.400   1800  2.5   2100  2.55     Carbohydrate Ratio -    Time Ratio   0000-  3   0700  1800  2100  2   2   3     Sensitivity   18   Active Insulin Time   hours   Basal    52% (54.8 Units)   Bolus     48% (51 Units)   Total Carbohydrates/day    205   Total Insulin/day     105 + 30   Average Blood Sugar                  DM Complications:   Nephropathy: No  Retinopathy: No   Neuropathy: No  Microalbuminuria: No  CAD/PAD: No  Gastroparesis: No  Hypoglycemia unawareness: No     2. Hypertension: Blood Pressure today:   BP Readings from Last 3 Encounters:   06/17/20 134/82   02/04/20 133/79   01/15/20 120/74      Blood pressure medications include lisinopril 10 mg.    3. Hyperlipidemia:  Not on medication.     PMH/PSH:  Past Medical History:   Diagnosis Date     Family history of diabetes mellitus      Mild intermittent asthma     EIA     Mild intermittent asthma     EIA     Type I diabetes mellitus, uncontrolled (H) 6/13/2016     Past Surgical History:   Procedure Laterality Date     HC CREATE EARDRUM OPENING,GEN ANESTH      P.E. Tubes/ 4-5 surgeries, annual from age 10)     HC REPAIR ING  HERNIA,6MO-5YR,REDUC  1992    also umbilical hernia     Family Hx:  Family History   Problem Relation Age of Onset     Hypertension Father      Depression Mother      Diabetes Paternal Uncle      Diabetes Other         2nd cousins     Cancer No family hx of      Heart Disease No family hx of            DM2:             Social Hx:  Social History     Socioeconomic History     Marital status: Single     Spouse name: Not on file     Number of children: Not on file     Years of education: Not on file     Highest education level: Not on file   Occupational History     Occupation: [employer]   Social Needs     Financial resource strain: Not on file     Food insecurity     Worry: Not on file     Inability: Not on file     Transportation needs     Medical: Not on file     Non-medical: Not on file   Tobacco Use     Smoking status: Never Smoker     Smokeless tobacco: Never Used   Substance and Sexual Activity     Alcohol use: Yes     Alcohol/week: 0.0 standard drinks     Drug use: No     Sexual activity: Not Currently   Lifestyle     Physical activity     Days per week: Not on file     Minutes per session: Not on file     Stress: Not on file   Relationships     Social connections     Talks on phone: Not on file     Gets together: Not on file     Attends Yarsanism service: Not on file     Active member of club or organization: Not on file     Attends meetings of clubs or organizations: Not on file     Relationship status: Not on file     Intimate partner violence     Fear of current or ex partner: Not on file     Emotionally abused: Not on file     Physically abused: Not on file     Forced sexual activity: Not on file   Other Topics Concern      Service Not Asked     Blood Transfusions Not Asked     Caffeine Concern Not Asked     Occupational Exposure Not Asked     Hobby Hazards Not Asked     Sleep Concern Not Asked     Stress Concern Not Asked     Weight Concern Not Asked     Special Diet Not Asked     Back Care Not  Asked     Exercise Yes     Bike Helmet Not Asked     Seat Belt Yes     Self-Exams Not Asked     Parent/sibling w/ CABG, MI or angioplasty before 65F 55M? Not Asked   Social History Narrative     Not on file          MEDICATIONS:  has a current medication list which includes the following prescription(s): acetaminophen, blood glucose, blood glucose monitoring, escitalopram, glucagon, insulin glargine, insulin syringe-needle u-100, lisinopril-hydrochlorothiazide, metformin, novolog vial, and order for dme.    ROS     ROS: 10 point ROS neg other than the symptoms noted above in the HPI.    Physical Exam   VS: There were no vitals taken for this visit.   GENERAL: healthy, alert and no distress  EYES: Eyes grossly normal to inspection, conjunctivae and sclerae normal  RESP: no audible wheeze, cough, or visible cyanosis.  No visible retractions or increased work of breathing.  Able to speak fully in complete sentences.  NEURO: Cranial nerves grossly intact, mentation intact and speech normal  PSYCH: mentation appears normal, affect normal/bright, judgement and insight intact, normal speech and appearance well-groomed.    LABS:  A1c:  Lab Results   Component Value Date    A1C 7.5 07/23/2020    A1C 7.0 06/17/2020    A1C 7.0 02/03/2020    A1C 7.6 03/20/2018    A1C 8.0 06/15/2017       BMP:  Creatinine   Date Value Ref Range Status   07/23/2020 0.70 0.66 - 1.25 mg/dL Final       Urine Micro:  Lab Results   Component Value Date    UMALCR Unable to calculate due to low value 07/23/2020        LFTs/Lipids:  Recent Labs   Lab Test 07/23/20  1147 06/17/20 06/15/17   CHOL 199 209* 175   HDL 47 53 46   * 121 104   TRIG 124 174* 127   CHOLHDLRATIO  --  4 3.8       TFTs:  TSH   Date Value Ref Range Status   06/15/2017 1.67 0.30 - 5.0 mcU/mL Final     Blood Glucose and pump data/ Meter reviewed.     All pertinent notes, labs, and images personally reviewed by me.     A/P  Mr.Christopher ANTONIO Gibbs is a 30 year old here for the  evaluation/management of diabetes:    1. DM1 - Under Poor control.  A1c 7.5%.  No complications associated with diabetes.  BG high.  No major episodes of hypoglycemia noted/reported.   Plan:  Discussed diagnosis, pathophysiology, management and treatment options of condition with pt.  Briefly discussed Medtronic 670 G pump  Also discussed Dexcom if he wants to continue with tandem-- he will start the process.  Increase basal rate and I:C ratio as noted below.  Close follow up in 6 weeks.  Repeat labs and follow-up in 3 months.    Time Rate (U/hr)   0000-  2.470   0700  2.400-->2.5   1800  2.5   2100  2.55       Time Ratio   0000-  3   0700  1800  2100  2-->1.8   2   3     2. Hypertension - Takes lisinopril 10 mg      3. Hyperlipidemia - Not on medication.    Discussed diagnosis, pathophysiology, management and treatment options of condition with pt.  Recommend strict BG control  Repeat FLP in 3 months  Consider starting medication after that.  4. Prevention  Opthalmology: Due for eye examination  ASA-no 2/2 to age  Smoking- no    Most Recent Immunizations   Administered Date(s) Administered     Flu, Unspecified 10/12/2019     HEPA 11/01/2008     HepA-Adult 06/03/2019     HepB 01/09/2004     Hib (PRP-T) 05/27/1992     Historical DTP/aP 01/01/1996     Influenza (H1N1) 12/08/2009     Influenza (IIV3) PF 10/17/2012     Influenza Vaccine IM > 6 months Valent IIV4 01/07/2019     MMR 11/14/2003     OPV, trivalent, live 12/13/1995     Pneumococcal 23 valent 11/18/2017     TD (ADULT, 7+) 11/14/2003     Tdap (Adacel,Boostrix) 11/11/2014     Recommend checking blood sugars before meals and at bedtime.    If Blood glucose are low more often-> 2-3 times/week- give us a call.  The patient is advised to Make better food choices: reduce carbs, Reduce portion size, weight loss and exercise 3-4 times a week.  Discussed hypoglycemia signs and symptoms as well as management in detail.      Follow-up:  Follow up in 6  weeks.    Bridgette Katz MD  Endocrinology   [facility]/[place]  7/28/2020  CC: Jacey Mon    All questions were answered.  The patient indicates understanding of the above issues and agrees with the plan set forth.     Disclaimer: This note consists of symbols derived from keyboarding, dictation and/or voice recognition software. As a result, there may be errors in the script that have gone undetected. Please consider this when interpreting information found in this chart.        Again, thank you for allowing me to participate in the care of your patient.        Sincerely,        Bridgette Katz MD

## 2020-07-28 NOTE — PATIENT INSTRUCTIONS
[facility] & [place] locations   Dr Katz, Endocrinology Department      [facility]   [address]  [place] [address]  Appointment [phone]  Fax: [phone]  [place]: Monday and Tuesday         [facility]   [address]  [place] [address]  Appointment [phone]  Fax: [phone]  [place]: Wednesday and Thursday            Please check the cost coverage and copay with insurance before recommended tests, services and medications (especially if new medications are prescribed).     If ordered, please get blood work done 1 week prior to your next appointment so they will be available to Dr. Katz at your visit.    To provide the best diabetic care, please bring your blood glucose meter to each and every visit with your  Endocrinologist. Your blood glucose meter/insulin pump will be downloaded at every appointment.  Please arrive 15 minutes before your scheduled appointment. This will allow for your blood glucose meter/insulin pump  to be downloaded.  If you are wearing DEXCOM please bring  or sharing code from the Dexcom Clarity Appt so that it can be downloaded.  If you are using freestyle steve personal sensors please bring the reader.  If you are using TANDEM insulin pump please have your username and password to get info from Tandem website.    Continue metformin at current dose.  Basal and carb ratio changed.  Follow up in 6 weeks.  Start process with Dexcom.  Fasting labs in 3 months.    Recommend checking blood sugars before meals and at bedtime.    If Blood glucose are low more often-> 2-3 times/week- give us a call.  The patient is advised to Make better food choices: reduce carbs, Reduce portion size, weight loss and exercise 3-4 times a week.  Discussed hypoglycemia signs and symptoms as well as management in detail.

## 2020-08-03 ENCOUNTER — TELEPHONE (OUTPATIENT)
Dept: ENDOCRINOLOGY | Facility: CLINIC | Age: 30
End: 2020-08-03

## 2020-08-03 NOTE — TELEPHONE ENCOUNTER
For dexcom.    LAST OFFICE/VIRTUAL VISIT:  07/28/20    FUTURE OFFICE/VIRTUAL VISIT:  None    Lab Results   Component Value Date    A1C 7.5 07/23/2020    A1C 7.0 06/17/2020    A1C 7.0 02/03/2020    A1C 7.6 03/20/2018    A1C 8.0 06/15/2017         Estrella Laura CMA  [facility]  [place]

## 2020-08-03 NOTE — TELEPHONE ENCOUNTER
Also received a form for dexcom from [facility].    Needs signature only.    Estrella Laura CMA  [facility]  [place]

## 2020-08-06 NOTE — TELEPHONE ENCOUNTER
Both Forms were faxed and sent to scanning.      Estrella Laura, [facility]  [place]

## 2020-08-24 DIAGNOSIS — E10.9 TYPE 1 DIABETES MELLITUS WITHOUT COMPLICATION (H): ICD-10-CM

## 2020-08-26 ENCOUNTER — MYC REFILL (OUTPATIENT)
Dept: ENDOCRINOLOGY | Facility: CLINIC | Age: 30
End: 2020-08-26

## 2020-08-26 DIAGNOSIS — E10.9 TYPE 1 DIABETES MELLITUS WITHOUT COMPLICATION (H): ICD-10-CM

## 2020-08-26 NOTE — TELEPHONE ENCOUNTER
Novolog  Requested too soon. See refill from 7/28/20    Metformin  Prescription approved per [facility] Refill Protocol.

## 2020-08-27 ENCOUNTER — MYC MEDICAL ADVICE (OUTPATIENT)
Dept: ENDOCRINOLOGY | Facility: CLINIC | Age: 30
End: 2020-08-27

## 2020-08-27 DIAGNOSIS — E10.9 TYPE 1 DIABETES MELLITUS WITHOUT COMPLICATION (H): ICD-10-CM

## 2020-08-27 NOTE — TELEPHONE ENCOUNTER
Routing refill request to provider for review/approval because:  Drug not on the [facility] refill protocol     Myc message sent to patient.

## 2020-09-01 ENCOUNTER — TELEPHONE (OUTPATIENT)
Dept: ENDOCRINOLOGY | Facility: CLINIC | Age: 30
End: 2020-09-01

## 2020-09-01 NOTE — TELEPHONE ENCOUNTER
Form from [facility] asking if they can dispense generic Novolog U-100 insulin instead of brand?  Form needs to be signed.    LAST OFFICE/VIRTUAL VISIT:  07/28/20    FUTURE OFFICE/VIRTUAL VISIT:  None    Lab Results   Component Value Date    A1C 7.5 07/23/2020    A1C 7.0 06/17/2020    A1C 7.0 02/03/2020    A1C 7.6 03/20/2018    A1C 8.0 06/15/2017         Estrella Laura CMA  [facility]  [place]

## 2020-09-03 NOTE — TELEPHONE ENCOUNTER
Form was faxed and sent to scanning.      Estrella Laura, [facility]  [place]

## 2020-09-18 DIAGNOSIS — E10.9 TYPE 1 DIABETES MELLITUS WITHOUT COMPLICATION (H): ICD-10-CM

## 2020-09-18 NOTE — TELEPHONE ENCOUNTER
"Medication is being filled for 1 time refill only due to:  Patient needs to be seen because pt due for diabetes follow up.        Requested Prescriptions   Pending Prescriptions Disp Refills     insulin aspart (NOVOLOG VIAL) 100 UNITS/ML vial [Pharmacy Med Name: INSULIN ASPART 100/ML INJ,10ML] 40 mL 1     Sig: ADMINISTER UP  UNITS VIA INSULIN PUMP EVERY DAY AS DIRECTED       Short Acting Insulin Protocol Passed - 9/18/2020  9:09 AM        Passed - Serum creatinine on file in past 12 months     Recent Labs   Lab Test 07/23/20  1147   CR 0.70       Ok to refill medication if creatinine is low          Passed - HgbA1C in past 3 or 6 months     If HgbA1C is 8 or greater, it needs to be on file within the past 3 months.  If less than 8, must be on file within the past 6 months.     Recent Labs   Lab Test 07/23/20  1147   A1C 7.5*             Passed - Medication is active on med list        Passed - Patient is age 18 or older        Passed - Recent (6 mo) or future (30 days) visit within the authorizing provider's specialty     Patient had office visit in the last 6 months or has a visit in the next 30 days with authorizing provider or within the authorizing provider's specialty.  See \"Patient Info\" tab in inbasket, or \"Choose Columns\" in Meds & Orders section of the refill encounter.                 "

## 2020-09-22 ENCOUNTER — MYC MEDICAL ADVICE (OUTPATIENT)
Dept: ENDOCRINOLOGY | Facility: CLINIC | Age: 30
End: 2020-09-22

## 2020-10-20 DIAGNOSIS — E10.9 TYPE 1 DIABETES MELLITUS WITHOUT COMPLICATION (H): ICD-10-CM

## 2020-10-21 ENCOUNTER — MYC REFILL (OUTPATIENT)
Dept: ENDOCRINOLOGY | Facility: CLINIC | Age: 30
End: 2020-10-21

## 2020-10-21 DIAGNOSIS — E10.9 TYPE 1 DIABETES MELLITUS WITHOUT COMPLICATION (H): ICD-10-CM

## 2020-10-21 NOTE — TELEPHONE ENCOUNTER
"  Prescription approved per [facility] Refill Protocol.      Requested Prescriptions   Pending Prescriptions Disp Refills     insulin aspart (NOVOLOG VIAL) 100 UNITS/ML vial [Pharmacy Med Name: INSULIN ASPART 100/ML INJ,10ML] 40 mL 0     Sig: ADMINISTER UP  UNITS VIA INSULIN PUMP EVERY DAY AS DIRECTED       Short Acting Insulin Protocol Passed - 10/20/2020  8:32 AM        Passed - Serum creatinine on file in past 12 months     Recent Labs   Lab Test 07/23/20  1147   CR 0.70       Ok to refill medication if creatinine is low          Passed - HgbA1C in past 3 or 6 months     If HgbA1C is 8 or greater, it needs to be on file within the past 3 months.  If less than 8, must be on file within the past 6 months.     Recent Labs   Lab Test 07/23/20  1147   A1C 7.5*             Passed - Medication is active on med list        Passed - Patient is age 18 or older        Passed - Recent (6 mo) or future (30 days) visit within the authorizing provider's specialty     Patient had office visit in the last 6 months or has a visit in the next 30 days with authorizing provider or within the authorizing provider's specialty.  See \"Patient Info\" tab in inbasket, or \"Choose Columns\" in Meds & Orders section of the refill encounter.                 "

## 2020-11-20 ENCOUNTER — MYC REFILL (OUTPATIENT)
Dept: ENDOCRINOLOGY | Facility: CLINIC | Age: 30
End: 2020-11-20

## 2020-11-20 DIAGNOSIS — E10.9 TYPE 1 DIABETES MELLITUS WITHOUT COMPLICATION (H): ICD-10-CM

## 2020-11-23 ENCOUNTER — MYC REFILL (OUTPATIENT)
Dept: ENDOCRINOLOGY | Facility: CLINIC | Age: 30
End: 2020-11-23

## 2020-11-23 DIAGNOSIS — E10.9 TYPE 1 DIABETES MELLITUS WITHOUT COMPLICATION (H): ICD-10-CM

## 2020-11-24 NOTE — TELEPHONE ENCOUNTER
Pending Prescriptions:                       Disp   Refills    insulin aspart (NOVOLOG VIAL) 100 UNITS/M*40 mL  0            Sig: ADMINISTER UP  UNITS VIA INSULIN PUMP EVERY           DAY AS DIRECTED      Medication is being filled for 1 time refill only due to:  pt has a future appt scheduled.

## 2020-12-08 ENCOUNTER — TELEPHONE (OUTPATIENT)
Dept: ENDOCRINOLOGY | Facility: CLINIC | Age: 30
End: 2020-12-08

## 2020-12-08 NOTE — TELEPHONE ENCOUNTER
Tandem Statement of Medical Necessity and Prescription Order.    LAST OFFICE/VIRTUAL VISIT:  07/28/20    FUTURE OFFICE/VIRTUAL VISIT:  None    Lab Results   Component Value Date    A1C 7.5 07/23/2020    A1C 7.0 06/17/2020    A1C 7.0 02/03/2020    A1C 7.6 03/20/2018    A1C 8.0 06/15/2017         Estrella Laura CMA  [facility]  [place]

## 2020-12-09 ENCOUNTER — MEDICAL CORRESPONDENCE (OUTPATIENT)
Dept: HEALTH INFORMATION MANAGEMENT | Facility: CLINIC | Age: 30
End: 2020-12-09

## 2020-12-09 DIAGNOSIS — I10 BENIGN ESSENTIAL HYPERTENSION: ICD-10-CM

## 2020-12-09 RX ORDER — LISINOPRIL AND HYDROCHLOROTHIAZIDE 12.5; 2 MG/1; MG/1
1 TABLET ORAL DAILY
Qty: 90 TABLET | Refills: 1 | COMMUNITY
Start: 2020-12-09

## 2020-12-09 NOTE — TELEPHONE ENCOUNTER
Tandem CMN form completed and signed.  Please fax back to Dr. Katz's staff.  Lucille Min NP  Endocrinology

## 2020-12-09 NOTE — TELEPHONE ENCOUNTER
Form, labs and notes were faxed and sent to scanning.      Estrella Laura, LEONEL  [facility]  [place]

## 2020-12-09 NOTE — TELEPHONE ENCOUNTER
Refused Prescriptions:                       Disp   Refills    lisinopril-hydrochlorothiazide (ZESTORETIC*90 tab*1        Sig: Take 1 tablet by mouth daily  Refused By: NUHA DENNIS  Reason for Refusal: Patient needs appointment    Pt last seen 6/17/2020 needs 6 month follow up

## 2020-12-10 ENCOUNTER — MYC REFILL (OUTPATIENT)
Dept: FAMILY MEDICINE | Facility: CLINIC | Age: 30
End: 2020-12-10

## 2020-12-10 DIAGNOSIS — I10 BENIGN ESSENTIAL HYPERTENSION: ICD-10-CM

## 2020-12-10 RX ORDER — LISINOPRIL AND HYDROCHLOROTHIAZIDE 12.5; 2 MG/1; MG/1
1 TABLET ORAL DAILY
Qty: 90 TABLET | Refills: 1 | Status: CANCELLED | OUTPATIENT
Start: 2020-12-10

## 2020-12-10 RX ORDER — LISINOPRIL AND HYDROCHLOROTHIAZIDE 12.5; 2 MG/1; MG/1
1 TABLET ORAL DAILY
Qty: 7 TABLET | Refills: 0 | COMMUNITY
Start: 2020-12-10 | End: 2020-12-15

## 2020-12-15 ENCOUNTER — OFFICE VISIT (OUTPATIENT)
Dept: FAMILY MEDICINE | Facility: CLINIC | Age: 30
End: 2020-12-15

## 2020-12-15 VITALS
SYSTOLIC BLOOD PRESSURE: 124 MMHG | TEMPERATURE: 97.6 F | BODY MASS INDEX: 42.51 KG/M2 | HEIGHT: 69 IN | OXYGEN SATURATION: 99 % | WEIGHT: 287 LBS | DIASTOLIC BLOOD PRESSURE: 80 MMHG | HEART RATE: 95 BPM

## 2020-12-15 DIAGNOSIS — F41.1 GENERALIZED ANXIETY DISORDER: ICD-10-CM

## 2020-12-15 DIAGNOSIS — E10.42 TYPE 1 DIABETES MELLITUS WITH DIABETIC POLYNEUROPATHY (H): ICD-10-CM

## 2020-12-15 DIAGNOSIS — I10 BENIGN ESSENTIAL HYPERTENSION: Primary | ICD-10-CM

## 2020-12-15 DIAGNOSIS — E66.01 MORBID OBESITY (H): ICD-10-CM

## 2020-12-15 LAB
BUN SERPL-MCNC: 10 MG/DL (ref 7–25)
BUN/CREATININE RATIO: 12.8 (ref 6–22)
CALCIUM SERPL-MCNC: 10.3 MG/DL (ref 8.6–10.3)
CHLORIDE SERPLBLD-SCNC: 101 MMOL/L (ref 98–110)
CHOLEST SERPL-MCNC: 204 MG/DL (ref 0–199)
CHOLEST/HDLC SERPL: 4 {RATIO} (ref 0–5)
CO2 SERPL-SCNC: 29.5 MMOL/L (ref 20–32)
CREAT SERPL-MCNC: 0.78 MG/DL (ref 0.7–1.18)
GLUCOSE SERPL-MCNC: 154 MG/DL (ref 60–99)
HBA1C MFR BLD: 7.2 % (ref 4–7)
HDLC SERPL-MCNC: 51 MG/DL (ref 40–150)
LDLC SERPL CALC-MCNC: 116 MG/DL (ref 0–130)
POTASSIUM SERPL-SCNC: 4.37 MMOL/L (ref 3.5–5.3)
SODIUM SERPL-SCNC: 138.4 MMOL/L (ref 135–146)
TRIGL SERPL-MCNC: 183 MG/DL (ref 0–149)

## 2020-12-15 PROCEDURE — 80061 LIPID PANEL: CPT | Performed by: PHYSICIAN ASSISTANT

## 2020-12-15 PROCEDURE — 83036 HEMOGLOBIN GLYCOSYLATED A1C: CPT | Performed by: PHYSICIAN ASSISTANT

## 2020-12-15 PROCEDURE — 36415 COLL VENOUS BLD VENIPUNCTURE: CPT | Performed by: PHYSICIAN ASSISTANT

## 2020-12-15 PROCEDURE — 99213 OFFICE O/P EST LOW 20 MIN: CPT | Performed by: PHYSICIAN ASSISTANT

## 2020-12-15 PROCEDURE — 80048 BASIC METABOLIC PNL TOTAL CA: CPT | Performed by: PHYSICIAN ASSISTANT

## 2020-12-15 RX ORDER — LISINOPRIL AND HYDROCHLOROTHIAZIDE 12.5; 2 MG/1; MG/1
1 TABLET ORAL DAILY
Qty: 90 TABLET | Refills: 3 | Status: SHIPPED | OUTPATIENT
Start: 2020-12-15 | End: 2021-12-01

## 2020-12-15 RX ORDER — ESCITALOPRAM OXALATE 10 MG/1
10 TABLET ORAL DAILY
Qty: 90 TABLET | Refills: 3 | Status: SHIPPED | OUTPATIENT
Start: 2020-12-15 | End: 2021-12-01

## 2020-12-15 ASSESSMENT — ANXIETY QUESTIONNAIRES
6. BECOMING EASILY ANNOYED OR IRRITABLE: SEVERAL DAYS
IF YOU CHECKED OFF ANY PROBLEMS ON THIS QUESTIONNAIRE, HOW DIFFICULT HAVE THESE PROBLEMS MADE IT FOR YOU TO DO YOUR WORK, TAKE CARE OF THINGS AT HOME, OR GET ALONG WITH OTHER PEOPLE: NOT DIFFICULT AT ALL
GAD7 TOTAL SCORE: 5
2. NOT BEING ABLE TO STOP OR CONTROL WORRYING: SEVERAL DAYS
7. FEELING AFRAID AS IF SOMETHING AWFUL MIGHT HAPPEN: SEVERAL DAYS
1. FEELING NERVOUS, ANXIOUS, OR ON EDGE: SEVERAL DAYS
5. BEING SO RESTLESS THAT IT IS HARD TO SIT STILL: NOT AT ALL
3. WORRYING TOO MUCH ABOUT DIFFERENT THINGS: SEVERAL DAYS

## 2020-12-15 ASSESSMENT — PATIENT HEALTH QUESTIONNAIRE - PHQ9
5. POOR APPETITE OR OVEREATING: NOT AT ALL
SUM OF ALL RESPONSES TO PHQ QUESTIONS 1-9: 4

## 2020-12-15 ASSESSMENT — MIFFLIN-ST. JEOR: SCORE: 2253.23

## 2020-12-15 NOTE — NURSING NOTE
Enrique is here for a fasting med check.        Pre-visit Screening:  Immunizations:  up to date  Colonoscopy:  NA  Mammogram: NA  Asthma Action Test/Plan:  NA  PHQ9:  Done today  GAD7:  Done today  Questioned patient about current smoking habits Pt. has never smoked.  Ok to leave detailed message on voice mail for today's visit only Yes, phone # mobile

## 2020-12-15 NOTE — PROGRESS NOTES
"CC: Medication Check    History:  HTN:  Takes lisinopril-hydrochlorothiazide once daily. Has not missed any doses. Denies any side effects. Does check at home and gets 120-130s/80s. Has been getting eye exams every year as recently as September. No chest pain, palpations, SOB, frequent headaches. Was trying to go on 3-5 mile walks daily, but this has been slowing down with winter. Does 10 minutes of weight lifting 2-3 times weekly. Admits that he had diet improved, but lagged as went into winter, and now getting better again. Has gained 7 lbs since 1/2020.      Anxiety:  Takes escitalopram 10 mg daily. No side effects.  Had been seeing therapist 2-3 times per month, which he continues to do. Therapy was started 3/2020.     DM1:  Managed by endocrinology. Has been doing virtual visits and has another virtual appt in 1 month. Has fasting lab orders in standing that he is planning to do next week, but he is fasting today, so will do this here. Will complete foot exam today as pt is due, and only having virtual visit.     PMH, MEDICATIONS, ALLERGIES, SOCIAL AND FAMILY HISTORY in [facility] and reviewed by me personally.    ROS negative other than the symptoms noted above in the HPI.     Examination   /80   Pulse 95   Temp 97.6  F (36.4  C) (Oral)   Ht 1.746 m (5' 8.75\")   Wt 130.2 kg (287 lb)   SpO2 99%   BMI 42.69 kg/m       Constitutional: Sitting comfortably, in no acute distress. Vital signs noted  Neck:  no adenopathy, trachea midline and normal to palpation, thyroid normal to palpation  Cardiovascular:  regular rate and rhythm, no murmurs, clicks, or gallops  Respiratory:  normal respiratory rate and rhythm, lungs clear to auscultation  SKIN: No jaundice/pallor/rash.   Psychiatric: mentation appears normal and affect normal/bright  Diabetic foot exam: No ulcer or callous formation. Monofilament test shows decreased sensation in distal, lateral fee, digits          A/P    ICD-10-CM    1. Benign essential " hypertension  I10 lisinopril-hydrochlorothiazide (ZESTORETIC) 20-12.5 MG tablet     Basic Metabolic Panel (BFP)   2. Generalized anxiety disorder  F41.1 escitalopram (LEXAPRO) 10 MG tablet   3. Type 1 diabetes mellitus with diabetic polyneuropathy (H)  E10.42 Hemoglobin A1c (BFP)     VENOUS COLLECTION     Lipid Panel (BFP)    Managed by endocrinology. Bilateral foot neuropathy   4. Morbid obesity (H)  E66.01        DISCUSSION:  1. Benign essential hypertension  BP is controlled today. Agreed to refill for 1 year without change. Will update BMP today, and [facility].   - lisinopril-hydrochlorothiazide (ZESTORETIC) 20-12.5 MG tablet; Take 1 tablet by mouth daily  Dispense: 90 tablet; Refill: 3  - Basic Metabolic Panel (BFP)    2. Generalized anxiety disorder  Enrique is doing well. PHQ/ANTONETTE updated today .Agreed to refill without change for 1 year. Contact me sooner with concerns.   - escitalopram (LEXAPRO) 10 MG tablet; Take 1 tablet (10 mg) by mouth daily  Dispense: 90 tablet; Refill: 3    3. Type 1 diabetes mellitus with diabetic polyneuropathy (H)  A1c today stable. Foot exam does show decrease sensation, and patient does complain of some mild numbness.   - Hemoglobin A1c (BFP)  - VENOUS COLLECTION  - Lipid Panel (BFP)    4. Morbid obesity (H)  Continue to work on diet and exercise with goal of weight loss.     follow up visit: 6 months    Verito Lucio PA-C  [facility]

## 2020-12-16 ASSESSMENT — ANXIETY QUESTIONNAIRES: GAD7 TOTAL SCORE: 5

## 2020-12-19 ENCOUNTER — MYC REFILL (OUTPATIENT)
Dept: ENDOCRINOLOGY | Facility: CLINIC | Age: 30
End: 2020-12-19

## 2020-12-19 DIAGNOSIS — E10.9 TYPE 1 DIABETES MELLITUS WITHOUT COMPLICATION (H): ICD-10-CM

## 2020-12-20 DIAGNOSIS — E10.9 TYPE 1 DIABETES MELLITUS WITHOUT COMPLICATION (H): ICD-10-CM

## 2020-12-21 NOTE — TELEPHONE ENCOUNTER
Prescription approved per [facility] Refill Protocol.   Patient has appointment with endo in next month.     Verónica Rowe RN Flex

## 2020-12-22 NOTE — TELEPHONE ENCOUNTER
Prescription approved per [facility] Refill Protocol.    Brenda Kaplan RN

## 2021-01-02 ENCOUNTER — MYC MEDICAL ADVICE (OUTPATIENT)
Dept: ENDOCRINOLOGY | Facility: CLINIC | Age: 31
End: 2021-01-02

## 2021-01-04 NOTE — TELEPHONE ENCOUNTER
Component      Latest Ref Rng & Units 12/15/2020   Carbon Dioxide      20 - 32 mmol/L 29.5   Creatinine      0.70 - 1.18 mg/dL 0.78   Glucose      60 - 99 mg/dL 154 (A)   Sodium      135 - 146 mmol/L 138.4   Potassium      3.5 - 5.3 mmol/L 4.37   Chloride      98 - 110 mmol/L 101.0   Urea Nitrogen      7 - 25 mg/dL 10   Calcium      8.6 - 10.3 mg/dL 10.3   BUN/Creatinine Ratio      6 - 22 12.8   Cholesterol      0 - 199 mg/dL 204 (A)   Triglycerides      0 - 149 mg/dL 183 (A)   HDL Cholesterol      40 - 150 mg/dL 51   LDL Cholesterol Direct      0 - 130 mg/dL 116   Cholesterol/HDL Ratio      0 - 5 4   Hemoglobin A1C      4.0 - 7.0 % 7.2 (A)     No labs are needed for upcoming visit as labs are recent.

## 2021-01-05 NOTE — TELEPHONE ENCOUNTER
Message sent via [employer].      Estrella Laura CMA  [facility]  [place]

## 2021-01-08 ENCOUNTER — TELEPHONE (OUTPATIENT)
Dept: EDUCATION SERVICES | Facility: CLINIC | Age: 31
End: 2021-01-08

## 2021-01-08 NOTE — TELEPHONE ENCOUNTER
Update from Tandem that upgrade pump has shipped and they have the orders for training.    Samina Worthington RN, CDCES        From: Shruti Russ <[email or web address]   Sent: 2021 2:07 PM  To: DEPT-[email or web address]-[email or web address]-[email or web address] <[email or web address]  Subject: [Secure: Tandem Pump Shipped!] CRM:[id number]    Hello!    We recently shipped the following pump:    Gonzalez antoine (: 1990) t:slim X2 with Control IQ pump recently shipped! Their provider is Bridgette Katz.    They are upgrading from a Tandem t:flex pump, and I have the transfer settings order.    Please let me know if you have any questions and/or concerns.    Kindly-      Shruti Russ, RN, BSN, CDCES (formerly CDE)  Clinical Diabetes Specialist

## 2021-01-09 ENCOUNTER — HEALTH MAINTENANCE LETTER (OUTPATIENT)
Age: 31
End: 2021-01-09

## 2021-01-11 ENCOUNTER — VIRTUAL VISIT (OUTPATIENT)
Dept: ENDOCRINOLOGY | Facility: CLINIC | Age: 31
End: 2021-01-11
Payer: COMMERCIAL

## 2021-01-11 DIAGNOSIS — E10.65 UNCONTROLLED TYPE 1 DIABETES MELLITUS WITH HYPERGLYCEMIA (H): ICD-10-CM

## 2021-01-11 DIAGNOSIS — E10.9 TYPE 1 DIABETES MELLITUS WITHOUT COMPLICATION (H): Primary | ICD-10-CM

## 2021-01-11 PROCEDURE — 99213 OFFICE O/P EST LOW 20 MIN: CPT | Mod: 95 | Performed by: INTERNAL MEDICINE

## 2021-01-11 PROCEDURE — 95251 CONT GLUC MNTR ANALYSIS I&R: CPT | Performed by: INTERNAL MEDICINE

## 2021-01-11 RX ORDER — PROCHLORPERAZINE 25 MG/1
SUPPOSITORY RECTAL
COMMUNITY
Start: 2020-08-07 | End: 2021-09-07

## 2021-01-11 RX ORDER — PROCHLORPERAZINE 25 MG/1
SUPPOSITORY RECTAL
COMMUNITY
Start: 2020-12-30 | End: 2021-09-09

## 2021-01-11 RX ORDER — PROCHLORPERAZINE 25 MG/1
SUPPOSITORY RECTAL
COMMUNITY
Start: 2020-11-05 | End: 2021-08-23

## 2021-01-11 NOTE — PATIENT INSTRUCTIONS
[facility] & [place] locations   Dr Katz, Endocrinology Department      [facility]   [address]  [place] [address]  Appointment [phone]  Fax: [phone]  [place]: Monday and Tuesday         [facility]   [address]  [place] [address]  Appointment [phone]  Fax: [phone]  [place]: Wednesday and Thursday            Please check the cost coverage and copay with insurance before recommended tests, services and medications (especially if new medications are prescribed).     If ordered, please get blood work done 1 week prior to your next appointment so they will be available to Dr. Katz at your visit.    To provide the best diabetic care, please bring your blood glucose meter to each and every visit with your  Endocrinologist. Your blood glucose meter/insulin pump will be downloaded at every appointment.  Please arrive 15 minutes before your scheduled appointment. This will allow for your blood glucose meter/insulin pump  to be downloaded.  If you are wearing DEXCOM please bring  or sharing code from the Dexcom Clarity Appt so that it can be downloaded.  If you are using freestyle steve personal sensors please bring the reader.  If you are using TANDEM insulin pump please have your username and password to get info from Tandem website.    Slight increase in basal rate during night and early morning.  Change in insulin: Carb ratio (mealtime insulin bolus)  Try to take insulin 10-15 minutes prior to eating.  Continue to use tandem insulin pump along with Dexcom CGM.  Fasting labs in 3 months.  Please make a lab appointment for blood work and follow up clinic appointment in 1 week after that to discuss results.    Recommend checking blood sugars before meals and at bedtime.    If Blood glucose are low more often-> 2-3 times/week- give us a call.  The patient is advised  to Make better food choices: reduce carbs, Reduce portion size, weight loss and exercise 3-4 times a week.  Discussed hypoglycemia signs and symptoms as well as management in detail.      Please note that schedule gets booked out fast and it is difficult to add on patients when schedule is full.  So it is highly advised that you make appointment ahead of time so that we can follow up on labs/imaging studies in timely manner. This will help us to serve you better.  Please call the clinic to make appropriate appointments.  Let us know if you have any questions or if you need any help with scheduling.

## 2021-01-11 NOTE — LETTER
"    1/11/2021         RE: Gonzalez Gibbs  [address]  [facility] [address]        Dear Colleague,    Thank you for referring your patient, Gonzalez Gibbs, to the [facility]. Please see a copy of my visit note below.    THIS IS A VIDEO VISIT:    Phone call visit/virtual visit encounter:    Name of patient: Gonzalez Gibbs    Date of encounter: 1/11/2021    Time of start of video visit: 4:03    Video started: 4:09    Video ended: 4;28    Time visit video ended: 4;34    Provider location: working from [place]/ [facility]    Patient location: patients home.    Mode of transmission: [email or web address] video/ [email or web address]    Verbal consent: obtained before starting visit. Pt is agreeable.      The patient has been notified of following:      \"This VIDEO visit will be conducted via a call between you and your physician/provider. We have found that certain health care needs can be provided without the need for a physical exam.  This service lets us provide the care you need with a short phone conversation.  If a prescription is necessary we can send it directly to your pharmacy.  If lab work is needed we can place an order for that and you can then stop by our lab to have the test done at a later time.     With new updates with corona virus patient might be billed as clinic visit.     If during the course of the call the physician/provider feels a telephone visit is not appropriate, you will not be charged for this service.\"      Past medical history, social history, family history, allergy and medications were reviewed and updated as appropriate.  Reviewed pertinent labs, notes, imaging studies personally.    Endocrinology Clinic Note:  Name: Gonzalez Gibbs  Seen for Diabetes f/u.   HPI:  Gonzalez Gibbs is a 30 year old male who presents for the evaluation/management of type 1 diabetes.  Was getting care at endocrinology clinic of [place] since last 2016 visit. " Records requested.  Was On insulin pump for last 10-12 years.    Now using TANDEM T-slim insulin pump. Using Novolog in pump.  Using new insulin pump with Dexcom.  CGM data reviewed.  Typically skips breakfast. Has lunch at 1:00 PM and dinner is at 5:30 PM- 7:00 PM. Snacks at 9:00 Pm lke fruits, fruit snacks.     1. Type 1 DM:  Orginally diagnosed at the age of: 13 years   Hospitalization for DKA: None  Current Regimen:Tandem  Insulin pump and metformin 1000 mg BID  BS checks: 3-4 times a day  Average Meter Download: Blood glucose data reviewed personally. See nursing note from this encounter for details.  Exercise: Few times a week  Last A1c: 7.2%  Episodes of hypoglycemia (low blood sugar): Occasional.  Gets symptoms  Fixed meal pattern: Yes  Patient counting carbs: Yes  Using PUMP: Yes.   Current Regimen:   Insulin pump -   Time Rate (U/hr)   0000-  2.470   0700  2.550   1800  2.50   2100  2.50     Carbohydrate Ratio -    Time Ratio   0000-  3   0700  1800  2100  1.8   2   2     Sensitivity   18   Active Insulin Time   hours   Basal    45% (61 Units)   Bolus     55% ( 44 Units)   Total Carbohydrates/day       Total Insulin/day       Average Blood Sugar                  DM Complications:   Nephropathy: No  Retinopathy: No   Neuropathy: No  Microalbuminuria: No  CAD/PAD: No  Gastroparesis: No  Hypoglycemia unawareness: No     2. Hypertension: Blood Pressure today:   BP Readings from Last 3 Encounters:   12/15/20 124/80   06/17/20 134/82   02/04/20 133/79      Blood pressure medications include lisinopril 10 mg.    3. Hyperlipidemia:  Not on medication.     PMH/PSH:  Past Medical History:   Diagnosis Date     Family history of diabetes mellitus      Mild intermittent asthma     EIA     Mild intermittent asthma     EIA     Type I diabetes mellitus, uncontrolled (H) 6/13/2016     Past Surgical History:   Procedure Laterality Date     HC CREATE EARDRUM OPENING,GEN ANESTH      P.E. Tubes/ 4-5 surgeries, annual from age  10)     HC REPAIR ING HERNIA,6MO-5YR,REDUC  1992    also umbilical hernia     Family Hx:  Family History   Problem Relation Age of Onset     Hypertension Father      Depression Mother      Diabetes Paternal Uncle      Diabetes Other         2nd cousins     Cancer No family hx of      Heart Disease No family hx of            DM2:             Social Hx:  Social History     Socioeconomic History     Marital status: Single     Spouse name: Not on file     Number of children: Not on file     Years of education: Not on file     Highest education level: Not on file   Occupational History     Occupation: [employer]   Social Needs     Financial resource strain: Not on file     Food insecurity     Worry: Not on file     Inability: Not on file     Transportation needs     Medical: Not on file     Non-medical: Not on file   Tobacco Use     Smoking status: Never Smoker     Smokeless tobacco: Never Used   Substance and Sexual Activity     Alcohol use: Yes     Alcohol/week: 0.0 standard drinks     Drug use: No     Sexual activity: Not Currently   Lifestyle     Physical activity     Days per week: Not on file     Minutes per session: Not on file     Stress: Not on file   Relationships     Social connections     Talks on phone: Not on file     Gets together: Not on file     Attends Christian service: Not on file     Active member of club or organization: Not on file     Attends meetings of clubs or organizations: Not on file     Relationship status: Not on file     Intimate partner violence     Fear of current or ex partner: Not on file     Emotionally abused: Not on file     Physically abused: Not on file     Forced sexual activity: Not on file   Other Topics Concern      Service Not Asked     Blood Transfusions Not Asked     Caffeine Concern Not Asked     Occupational Exposure Not Asked     Hobby Hazards Not Asked     Sleep Concern Not Asked     Stress Concern Not Asked     Weight Concern Not Asked     Special Diet Not Asked      Back Care Not Asked     Exercise Yes     Bike Helmet Not Asked     Seat Belt Yes     Self-Exams Not Asked     Parent/sibling w/ CABG, MI or angioplasty before 65F 55M? Not Asked   Social History Narrative     Not on file          MEDICATIONS:  has a current medication list which includes the following prescription(s): acetaminophen, blood glucose, blood glucose monitoring, dexcom g6 , dexcom g6 sensor, dexcom g6 transmitter, escitalopram, glucagon emergency, insulin aspart, insulin glargine, insulin syringe-needle u-100, lisinopril-hydrochlorothiazide, metformin, and order for dme.    ROS     ROS: 10 point ROS neg other than the symptoms noted above in the HPI.    Physical Exam   VS: There were no vitals taken for this visit.   GENERAL: healthy, alert and no distress  EYES: Eyes grossly normal to inspection, conjunctivae and sclerae normal  RESP: no audible wheeze, cough, or visible cyanosis.  No visible retractions or increased work of breathing.  Able to speak fully in complete sentences.  NEURO: Cranial nerves grossly intact, mentation intact and speech normal  PSYCH: mentation appears normal, affect normal/bright, judgement and insight intact, normal speech and appearance well-groomed.    LABS:  A1c:  Lab Results   Component Value Date    A1C 7.2 12/15/2020    A1C 7.5 07/23/2020    A1C 7.0 06/17/2020    A1C 7.0 02/03/2020    A1C 7.6 03/20/2018       BMP:  Creatinine   Date Value Ref Range Status   12/15/2020 0.78 0.70 - 1.18 mg/dL Final       Urine Micro:  Lab Results   Component Value Date    UMALCR Unable to calculate due to low value 07/23/2020        LFTs/Lipids:  Recent Labs   Lab Test 07/23/20  1147 06/17/20 06/15/17   CHOL 199 209* 175   HDL 47 53 46   * 121 104   TRIG 124 174* 127   CHOLHDLRATIO  --  4 3.8       TFTs:  TSH   Date Value Ref Range Status   06/15/2017 1.67 0.30 - 5.0 mcU/mL Final         Glucometer/ insulin pump (if applicable)/ CGM data (if applicable) downloaded,  Personally reviewed and interpreted.  All Blood sugar data reviewed personally and discussed with pt.  See nursing note from today's clinic visit for details of BG/CGM log.  All pertinent notes, labs, and images personally reviewed by me.     A/P  Mr.Christopher ANTONIO Gibbs is a 30 year old here for the evaluation/management of diabetes:    1. DM1 - Under Poor control.  A1c 7.2%.  No complications associated with diabetes.  BG high.  No major episodes of hypoglycemia noted/reported.   Plan:  Discussed diagnosis, pathophysiology, management and treatment options of condition with pt.  Change in pump settings as below:  Time Rate (U/hr)   0000-  2.470 --> 2.5   0700  2.550--> 2.65   1800  2.50--> 2.6   2100  2.50     Carbohydrate Ratio -    Time Ratio   0000-  3   0700  1800  2100  1.8-->1.6   2-->1.8   2       2. Hypertension - Takes lisinopril 10 mg      3. Hyperlipidemia - Not on medication.  .  Discussed diagnosis, pathophysiology, management and treatment options of condition with pt.  Recommend strict BG control.  Discussed starting statin. At this time he wants to     4. Prevention  Opthalmology: Due for eye examination  ASA-no 2/2 to age  Smoking- no    Most Recent Immunizations   Administered Date(s) Administered     Flu, Unspecified 10/12/2019     HEPA 11/01/2008     HepA-Adult 06/03/2019     HepB 01/09/2004     Hib (PRP-T) 05/27/1992     Historical DTP/aP 01/01/1996     Influenza (H1N1) 12/08/2009     Influenza (IIV3) PF 10/17/2012     Influenza Vaccine IM > 6 months Valent IIV4 01/07/2019     Influenza,INJ,MDCK,PF,Quad >4yrs 09/15/2020     MMR 11/14/2003     OPV, trivalent, live 12/13/1995     Pneumococcal 23 valent 11/18/2017     TD (ADULT, 7+) 11/14/2003     Tdap (Adacel,Boostrix) 11/11/2014     Recommend checking blood sugars before meals and at bedtime.    If Blood glucose are low more often-> 2-3 times/week- give us a call.  The patient is advised to Make better food choices: reduce carbs,  Reduce portion size, weight loss and exercise 3-4 times a week.  Discussed hypoglycemia signs and symptoms as well as management in detail.      Follow-up:  Follow up in 3 months    Bridgette Katz MD  Endocrinology   [facility]/[place]  1/11/2021  CC: Jacey Mon    All questions were answered.  The patient indicates understanding of the above issues and agrees with the plan set forth.     Disclaimer: This note consists of symbols derived from keyboarding, dictation and/or voice recognition software. As a result, there may be errors in the script that have gone undetected. Please consider this when interpreting information found in this chart.          Again, thank you for allowing me to participate in the care of your patient.        Sincerely,        Bridgette Katz MD

## 2021-01-11 NOTE — PROGRESS NOTES
"THIS IS A VIDEO VISIT:    Phone call visit/virtual visit encounter:    Name of patient: Gonzalez Gibbs    Date of encounter: 1/11/2021    Time of start of video visit: 4:03    Video started: 4:09    Video ended: 4;28    Time visit video ended: 4;34    Provider location: working from [place]/ [facility]    Patient location: patients home.    Mode of transmission: [employer]ogi[employer] video/ [employer]kan    Verbal consent: obtained before starting visit. Pt is agreeable.      The patient has been notified of following:      \"This VIDEO visit will be conducted via a call between you and your physician/provider. We have found that certain health care needs can be provided without the need for a physical exam.  This service lets us provide the care you need with a short phone conversation.  If a prescription is necessary we can send it directly to your pharmacy.  If lab work is needed we can place an order for that and you can then stop by our lab to have the test done at a later time.     With new updates with corona virus patient might be billed as clinic visit.     If during the course of the call the physician/provider feels a telephone visit is not appropriate, you will not be charged for this service.\"      Past medical history, social history, family history, allergy and medications were reviewed and updated as appropriate.  Reviewed pertinent labs, notes, imaging studies personally.    Endocrinology Clinic Note:  Name: Gonzalez Gibbs  Seen for Diabetes f/u.   HPI:  Gonzalez Gibbs is a 30 year old male who presents for the evaluation/management of type 1 diabetes.  Was getting care at endocrinology clinic of [place] since last 2016 visit. Records requested.  Was On insulin pump for last 10-12 years.    Now using TANDEM T-slim insulin pump. Using Novolog in pump.  Using new insulin pump with Dexcom.  CGM data reviewed.  Typically skips breakfast. Has lunch at 1:00 PM and dinner is at 5:30 PM- 7:00 PM. " Snacks at 9:00 Pm lke fruits, fruit snacks.     1. Type 1 DM:  Orginally diagnosed at the age of: 13 years   Hospitalization for DKA: None  Current Regimen:Tandem  Insulin pump and metformin 1000 mg BID  BS checks: 3-4 times a day  Average Meter Download: Blood glucose data reviewed personally. See nursing note from this encounter for details.  Exercise: Few times a week  Last A1c: 7.2%  Episodes of hypoglycemia (low blood sugar): Occasional.  Gets symptoms  Fixed meal pattern: Yes  Patient counting carbs: Yes  Using PUMP: Yes.   Current Regimen:   Insulin pump -   Time Rate (U/hr)   0000-  2.470   0700  2.550   1800  2.50   2100  2.50     Carbohydrate Ratio -    Time Ratio   0000-  3   0700  1800  2100  1.8   2   2     Sensitivity   18   Active Insulin Time   hours   Basal    45% (61 Units)   Bolus     55% ( 44 Units)   Total Carbohydrates/day       Total Insulin/day       Average Blood Sugar                  DM Complications:   Nephropathy: No  Retinopathy: No   Neuropathy: No  Microalbuminuria: No  CAD/PAD: No  Gastroparesis: No  Hypoglycemia unawareness: No     2. Hypertension: Blood Pressure today:   BP Readings from Last 3 Encounters:   12/15/20 124/80   06/17/20 134/82   02/04/20 133/79      Blood pressure medications include lisinopril 10 mg.    3. Hyperlipidemia:  Not on medication.     PMH/PSH:  Past Medical History:   Diagnosis Date     Family history of diabetes mellitus      Mild intermittent asthma     EIA     Mild intermittent asthma     EIA     Type I diabetes mellitus, uncontrolled (H) 6/13/2016     Past Surgical History:   Procedure Laterality Date     HC CREATE EARDRUM OPENING,GEN ANESTH      P.E. Tubes/ 4-5 surgeries, annual from age 10)     HC REPAIR ING HERNIA,6MO-5YR,REDUC  1992    also umbilical hernia     Family Hx:  Family History   Problem Relation Age of Onset     Hypertension Father      Depression Mother      Diabetes Paternal Uncle      Diabetes Other         2nd cousins     Cancer No  family hx of      Heart Disease No family hx of            DM2:             Social Hx:  Social History     Socioeconomic History     Marital status: Single     Spouse name: Not on file     Number of children: Not on file     Years of education: Not on file     Highest education level: Not on file   Occupational History     Occupation: [employer]   Social Needs     Financial resource strain: Not on file     Food insecurity     Worry: Not on file     Inability: Not on file     Transportation needs     Medical: Not on file     Non-medical: Not on file   Tobacco Use     Smoking status: Never Smoker     Smokeless tobacco: Never Used   Substance and Sexual Activity     Alcohol use: Yes     Alcohol/week: 0.0 standard drinks     Drug use: No     Sexual activity: Not Currently   Lifestyle     Physical activity     Days per week: Not on file     Minutes per session: Not on file     Stress: Not on file   Relationships     Social connections     Talks on phone: Not on file     Gets together: Not on file     Attends [employer] service: Not on file     Active member of club or organization: Not on file     Attends meetings of clubs or organizations: Not on file     Relationship status: Not on file     Intimate partner violence     Fear of current or ex partner: Not on file     Emotionally abused: Not on file     Physically abused: Not on file     Forced sexual activity: Not on file   Other Topics Concern      Service Not Asked     Blood Transfusions Not Asked     Caffeine Concern Not Asked     Occupational Exposure Not Asked     Hobby Hazards Not Asked     Sleep Concern Not Asked     Stress Concern Not Asked     Weight Concern Not Asked     Special Diet Not Asked     Back Care Not Asked     Exercise Yes     Bike Helmet Not Asked     Seat Belt Yes     Self-Exams Not Asked     Parent/sibling w/ CABG, MI or angioplasty before 65F 55M? Not Asked   Social History Narrative     Not on file          MEDICATIONS:  has a current  medication list which includes the following prescription(s): acetaminophen, blood glucose, blood glucose monitoring, dexcom g6 , dexcom g6 sensor, dexcom g6 transmitter, escitalopram, glucagon emergency, insulin aspart, insulin glargine, insulin syringe-needle u-100, lisinopril-hydrochlorothiazide, metformin, and order for dme.    ROS     ROS: 10 point ROS neg other than the symptoms noted above in the HPI.    Physical Exam   VS: There were no vitals taken for this visit.   GENERAL: healthy, alert and no distress  EYES: Eyes grossly normal to inspection, conjunctivae and sclerae normal  RESP: no audible wheeze, cough, or visible cyanosis.  No visible retractions or increased work of breathing.  Able to speak fully in complete sentences.  NEURO: Cranial nerves grossly intact, mentation intact and speech normal  PSYCH: mentation appears normal, affect normal/bright, judgement and insight intact, normal speech and appearance well-groomed.    LABS:  A1c:  Lab Results   Component Value Date    A1C 7.2 12/15/2020    A1C 7.5 07/23/2020    A1C 7.0 06/17/2020    A1C 7.0 02/03/2020    A1C 7.6 03/20/2018       BMP:  Creatinine   Date Value Ref Range Status   12/15/2020 0.78 0.70 - 1.18 mg/dL Final       Urine Micro:  Lab Results   Component Value Date    UMALCR Unable to calculate due to low value 07/23/2020        LFTs/Lipids:  Recent Labs   Lab Test 07/23/20  1147 06/17/20 06/15/17   CHOL 199 209* 175   HDL 47 53 46   * 121 104   TRIG 124 174* 127   CHOLHDLRATIO  --  4 3.8       TFTs:  TSH   Date Value Ref Range Status   06/15/2017 1.67 0.30 - 5.0 mcU/mL Final         Glucometer/ insulin pump (if applicable)/ CGM data (if applicable) downloaded, Personally reviewed and interpreted.  All Blood sugar data reviewed personally and discussed with pt.  See nursing note from today's clinic visit for details of BG/CGM log.  All pertinent notes, labs, and images personally reviewed by me.     A/P  Mr.Christopher ALVAREZ  Sai is a 30 year old here for the evaluation/management of diabetes:    1. DM1 - Under Poor control.  A1c 7.2%.  No complications associated with diabetes.  BG high.  No major episodes of hypoglycemia noted/reported.   Plan:  Discussed diagnosis, pathophysiology, management and treatment options of condition with pt.  Change in pump settings as below:  Time Rate (U/hr)   0000-  2.470 --> 2.5   0700  2.550--> 2.65   1800  2.50--> 2.6   2100  2.50     Carbohydrate Ratio -    Time Ratio   0000-  3   0700  1800  2100  1.8-->1.6   2-->1.8   2       2. Hypertension - Takes lisinopril 10 mg      3. Hyperlipidemia - Not on medication.  .  Discussed diagnosis, pathophysiology, management and treatment options of condition with pt.  Recommend strict BG control.  Discussed starting statin. At this time he wants to     4. Prevention  Opthalmology: Due for eye examination  ASA-no 2/2 to age  Smoking- no    Most Recent Immunizations   Administered Date(s) Administered     Flu, Unspecified 10/12/2019     HEPA 11/01/2008     HepA-Adult 06/03/2019     HepB 01/09/2004     Hib (PRP-T) 05/27/1992     Historical DTP/aP 01/01/1996     Influenza (H1N1) 12/08/2009     Influenza (IIV3) PF 10/17/2012     Influenza Vaccine IM > 6 months Valent IIV4 01/07/2019     Influenza,INJ,MDCK,PF,Quad >4yrs 09/15/2020     MMR 11/14/2003     OPV, trivalent, live 12/13/1995     Pneumococcal 23 valent 11/18/2017     TD (ADULT, 7+) 11/14/2003     Tdap (Adacel,Boostrix) 11/11/2014     Recommend checking blood sugars before meals and at bedtime.    If Blood glucose are low more often-> 2-3 times/week- give us a call.  The patient is advised to Make better food choices: reduce carbs, Reduce portion size, weight loss and exercise 3-4 times a week.  Discussed hypoglycemia signs and symptoms as well as management in detail.      Follow-up:  Follow up in 3 months    Bridgette Katz MD  Endocrinology   [facility]/[place]  1/11/2021  CC:  Jacey Mon    All questions were answered.  The patient indicates understanding of the above issues and agrees with the plan set forth.     Disclaimer: This note consists of symbols derived from keyboarding, dictation and/or voice recognition software. As a result, there may be errors in the script that have gone undetected. Please consider this when interpreting information found in this chart.

## 2021-02-10 ENCOUNTER — MYC MEDICAL ADVICE (OUTPATIENT)
Dept: ENDOCRINOLOGY | Facility: CLINIC | Age: 31
End: 2021-02-10

## 2021-02-14 ENCOUNTER — MYC REFILL (OUTPATIENT)
Dept: ENDOCRINOLOGY | Facility: CLINIC | Age: 31
End: 2021-02-14

## 2021-02-14 DIAGNOSIS — E10.9 TYPE 1 DIABETES MELLITUS WITHOUT COMPLICATION (H): ICD-10-CM

## 2021-02-16 ENCOUNTER — MYC REFILL (OUTPATIENT)
Dept: ENDOCRINOLOGY | Facility: CLINIC | Age: 31
End: 2021-02-16

## 2021-02-16 DIAGNOSIS — E10.9 TYPE 1 DIABETES MELLITUS WITHOUT COMPLICATION (H): ICD-10-CM

## 2021-02-17 NOTE — TELEPHONE ENCOUNTER
Pending Prescriptions:                       Disp   Refills    insulin aspart (NOVOLOG VIAL) 100 UNITS/M*40 mL  0            Sig: ADMINISTER UP  UNITS VIA INSULIN PUMP EVERY           DAY AS DIRECTED    Prescription approved per [facility] Refill Protocol.

## 2021-02-17 NOTE — TELEPHONE ENCOUNTER
Pending Prescriptions:                       Disp   Refills    insulin aspart (NOVOLOG VIAL) 100 UNITS/M*40 mL  0            Sig: ADMINISTER UP  UNITS VIA INSULIN PUMP EVERY           DAY AS DIRECTED    Prescription approved per [facility] Refill Protocol.    Patient is requesting an additional refill

## 2021-02-22 DIAGNOSIS — E10.9 TYPE 1 DIABETES MELLITUS WITHOUT COMPLICATION (H): ICD-10-CM

## 2021-02-22 NOTE — TELEPHONE ENCOUNTER
Rx sent.    Lab Results   Component Value Date    A1C 7.2 12/15/2020    A1C 7.5 07/23/2020    A1C 7.0 06/17/2020    A1C 7.0 02/03/2020    A1C 7.6 03/20/2018     Creatinine   Date Value Ref Range Status   12/15/2020 0.78 0.70 - 1.18 mg/dL Final

## 2021-02-22 NOTE — TELEPHONE ENCOUNTER
"Requested Prescriptions   Pending Prescriptions Disp Refills     metFORMIN (GLUCOPHAGE) 1000 MG tablet  Last Written Prescription Date:  08/26/20  Last Fill Quantity: 180,  # refills: 1   Last office visit: Visit date not found with prescribing provider:  CALE 01/11/21   Future Office Visit:           180 tablet 1       Biguanide Agents Passed - 2/22/2021 10:18 AM        Passed - Patient is age 10 or older        Passed - Patient has documented A1c within the specified period of time.     If HgbA1C is 8 or greater, it needs to be on file within the past 3 months.  If less than 8, must be on file within the past 6 months.     Recent Labs   Lab Test 12/15/20  0949   A1C 7.2*             Passed - Patient's CR is NOT>1.4 OR Patient's EGFR is NOT<45 within past 12 mos.     Recent Labs   Lab Test 07/23/20  1147   GFRESTIMATED >90   GFRESTBLACK >90       Recent Labs   Lab Test 12/15/20   CR 0.78             Passed - Patient does NOT have a diagnosis of CHF.        Passed - Medication is active on med list        Passed - Recent (6 mo) or future (30 days) visit within the authorizing provider's specialty     Patient had office visit in the last 6 months or has a visit in the next 30 days with authorizing provider or within the authorizing provider's specialty.  See \"Patient Info\" tab in inbasket, or \"Choose Columns\" in Meds & Orders section of the refill encounter.                 "

## 2021-03-10 ENCOUNTER — MYC MEDICAL ADVICE (OUTPATIENT)
Dept: ENDOCRINOLOGY | Facility: CLINIC | Age: 31
End: 2021-03-10

## 2021-03-30 ENCOUNTER — IMMUNIZATION (OUTPATIENT)
Dept: NURSING | Facility: CLINIC | Age: 31
End: 2021-03-30
Payer: COMMERCIAL

## 2021-03-30 PROCEDURE — 91300 PR COVID VAC PFIZER DIL RECON 30 MCG/0.3 ML IM: CPT

## 2021-03-30 PROCEDURE — 0001A PR COVID VAC PFIZER DIL RECON 30 MCG/0.3 ML IM: CPT

## 2021-04-16 ENCOUNTER — MYC REFILL (OUTPATIENT)
Dept: ENDOCRINOLOGY | Facility: CLINIC | Age: 31
End: 2021-04-16

## 2021-04-16 DIAGNOSIS — E10.9 TYPE 1 DIABETES MELLITUS WITHOUT COMPLICATION (H): ICD-10-CM

## 2021-04-20 ENCOUNTER — IMMUNIZATION (OUTPATIENT)
Dept: NURSING | Facility: CLINIC | Age: 31
End: 2021-04-20
Attending: INTERNAL MEDICINE
Payer: COMMERCIAL

## 2021-04-20 PROCEDURE — 91300 PR COVID VAC PFIZER DIL RECON 30 MCG/0.3 ML IM: CPT

## 2021-04-20 PROCEDURE — 0002A PR COVID VAC PFIZER DIL RECON 30 MCG/0.3 ML IM: CPT

## 2021-07-03 ENCOUNTER — HEALTH MAINTENANCE LETTER (OUTPATIENT)
Age: 31
End: 2021-07-03

## 2021-07-21 ENCOUNTER — MYC MEDICAL ADVICE (OUTPATIENT)
Dept: FAMILY MEDICINE | Facility: CLINIC | Age: 31
End: 2021-07-21

## 2021-07-23 ENCOUNTER — MYC MEDICAL ADVICE (OUTPATIENT)
Dept: FAMILY MEDICINE | Facility: CLINIC | Age: 31
End: 2021-07-23

## 2021-07-30 ENCOUNTER — MYC MEDICAL ADVICE (OUTPATIENT)
Dept: ENDOCRINOLOGY | Facility: CLINIC | Age: 31
End: 2021-07-30

## 2021-08-02 NOTE — TELEPHONE ENCOUNTER
DMV DM Form    LAST OFFICE/VIRTUAL VISIT:  01/11/21    FUTURE OFFICE/VIRTUAL VISIT:  None    Lab Results   Component Value Date    A1C 7.2 12/15/2020    A1C 7.5 07/23/2020    A1C 7.0 06/17/2020    A1C 7.0 02/03/2020    A1C 7.6 03/20/2018         Estrella Laura CMA  [facility]  Karena/Lila

## 2021-08-04 NOTE — TELEPHONE ENCOUNTER
Form was faxed and sent to scanning.      Message sent via [employer].      Estrella Laura CMA  [facility]  Karena/Lila

## 2021-09-05 ENCOUNTER — MYC MEDICAL ADVICE (OUTPATIENT)
Dept: ENDOCRINOLOGY | Facility: CLINIC | Age: 31
End: 2021-09-05

## 2021-09-05 DIAGNOSIS — E10.42 TYPE 1 DIABETES MELLITUS WITH DIABETIC POLYNEUROPATHY (H): Primary | ICD-10-CM

## 2021-09-08 RX ORDER — PROCHLORPERAZINE 25 MG/1
SUPPOSITORY RECTAL
Qty: 1 EACH | Refills: 1 | Status: SHIPPED | OUTPATIENT
Start: 2021-09-08

## 2021-09-08 NOTE — TELEPHONE ENCOUNTER
My chart message sent by patient.    My chart message sent to patient.       Prescription approved per [facility] Refill Protocol.

## 2021-09-09 ENCOUNTER — MYC MEDICAL ADVICE (OUTPATIENT)
Dept: ENDOCRINOLOGY | Facility: CLINIC | Age: 31
End: 2021-09-09

## 2021-09-09 DIAGNOSIS — E10.42 TYPE 1 DIABETES MELLITUS WITH DIABETIC POLYNEUROPATHY (H): Primary | ICD-10-CM

## 2021-09-09 RX ORDER — PROCHLORPERAZINE 25 MG/1
SUPPOSITORY RECTAL
Qty: 3 EACH | Refills: 4 | Status: SHIPPED | OUTPATIENT
Start: 2021-09-09 | End: 2022-01-25

## 2021-09-16 ENCOUNTER — MYC MEDICAL ADVICE (OUTPATIENT)
Dept: FAMILY MEDICINE | Facility: CLINIC | Age: 31
End: 2021-09-16

## 2021-09-16 DIAGNOSIS — F41.1 GENERALIZED ANXIETY DISORDER WITH PANIC ATTACKS: Primary | ICD-10-CM

## 2021-09-16 DIAGNOSIS — F41.0 GENERALIZED ANXIETY DISORDER WITH PANIC ATTACKS: Primary | ICD-10-CM

## 2021-09-16 RX ORDER — ESCITALOPRAM OXALATE 10 MG/1
15 TABLET ORAL DAILY
Status: CANCELLED | OUTPATIENT
Start: 2021-09-16

## 2021-09-16 RX ORDER — ESCITALOPRAM OXALATE 10 MG/1
5 TABLET ORAL DAILY
Qty: 45 TABLET | Refills: 0 | Status: SHIPPED | OUTPATIENT
Start: 2021-09-16 | End: 2021-12-01

## 2021-09-16 NOTE — TELEPHONE ENCOUNTER
Please review pt's MyChart message and advise. Escitalopram 10MG last refilled 12/15/20. Pt is now taking 15MG therefore he ran out of refills sooner than expected. Please send in new script for 15MG if appropriate.    Thanks, Milana

## 2021-10-10 ENCOUNTER — MYC REFILL (OUTPATIENT)
Dept: ENDOCRINOLOGY | Facility: CLINIC | Age: 31
End: 2021-10-10

## 2021-10-10 DIAGNOSIS — E10.9 TYPE 1 DIABETES MELLITUS WITHOUT COMPLICATION (H): ICD-10-CM

## 2021-10-13 ENCOUNTER — MYC REFILL (OUTPATIENT)
Dept: ENDOCRINOLOGY | Facility: CLINIC | Age: 31
End: 2021-10-13

## 2021-10-13 ENCOUNTER — MYC MEDICAL ADVICE (OUTPATIENT)
Dept: ENDOCRINOLOGY | Facility: CLINIC | Age: 31
End: 2021-10-13

## 2021-10-13 DIAGNOSIS — E10.9 TYPE 1 DIABETES MELLITUS WITHOUT COMPLICATION (H): ICD-10-CM

## 2021-10-19 ENCOUNTER — TELEPHONE (OUTPATIENT)
Dept: ENDOCRINOLOGY | Facility: CLINIC | Age: 31
End: 2021-10-19

## 2021-10-19 NOTE — TELEPHONE ENCOUNTER
Order for syringe w/ needle for insulin pump received via fax. Form in your mailbox to be signed.

## 2021-10-19 NOTE — TELEPHONE ENCOUNTER
Elliot form for dm supplies    LAST OFFICE/VIRTUAL VISIT:  01/11/21    FUTURE OFFICE/VIRTUAL VISIT:  01/10/22    Lab Results   Component Value Date    A1C 7.2 12/15/2020    A1C 7.5 07/23/2020    A1C 7.0 06/17/2020    A1C 7.0 02/03/2020    A1C 7.6 03/20/2018         Estrella Laura CMA  [facility]  Karena/Lila

## 2021-10-20 ENCOUNTER — MEDICAL CORRESPONDENCE (OUTPATIENT)
Dept: HEALTH INFORMATION MANAGEMENT | Facility: CLINIC | Age: 31
End: 2021-10-20
Payer: COMMERCIAL

## 2021-10-20 NOTE — TELEPHONE ENCOUNTER
Form was faxed and sent to scanning.      Estrella Laura, [facility]  Karena/Lila

## 2021-10-23 ENCOUNTER — HEALTH MAINTENANCE LETTER (OUTPATIENT)
Age: 31
End: 2021-10-23

## 2021-11-28 ENCOUNTER — MYC MEDICAL ADVICE (OUTPATIENT)
Dept: FAMILY MEDICINE | Facility: CLINIC | Age: 31
End: 2021-11-28

## 2021-12-01 ENCOUNTER — OFFICE VISIT (OUTPATIENT)
Dept: FAMILY MEDICINE | Facility: CLINIC | Age: 31
End: 2021-12-01

## 2021-12-01 VITALS
HEART RATE: 83 BPM | OXYGEN SATURATION: 98 % | SYSTOLIC BLOOD PRESSURE: 130 MMHG | WEIGHT: 298 LBS | HEIGHT: 69 IN | DIASTOLIC BLOOD PRESSURE: 86 MMHG | BODY MASS INDEX: 44.14 KG/M2 | TEMPERATURE: 98.2 F

## 2021-12-01 DIAGNOSIS — E10.9 TYPE 1 DIABETES MELLITUS WITHOUT COMPLICATION (H): Primary | ICD-10-CM

## 2021-12-01 DIAGNOSIS — E66.01 MORBID OBESITY (H): ICD-10-CM

## 2021-12-01 DIAGNOSIS — S90.421A: ICD-10-CM

## 2021-12-01 DIAGNOSIS — I10 BENIGN ESSENTIAL HYPERTENSION: ICD-10-CM

## 2021-12-01 DIAGNOSIS — F41.1 GENERALIZED ANXIETY DISORDER WITH PANIC ATTACKS: ICD-10-CM

## 2021-12-01 DIAGNOSIS — F41.0 GENERALIZED ANXIETY DISORDER WITH PANIC ATTACKS: ICD-10-CM

## 2021-12-01 LAB
ALBUMIN (URINE) MG/L: 10
ALBUMIN SERPL-MCNC: 4.6 G/DL (ref 3.6–5.1)
ALBUMIN URINE MG/G CR: <30 MG/G CREATININE
ALBUMIN/GLOB SERPL: 2.1 {RATIO} (ref 1–2.5)
ALP SERPL-CCNC: 41 U/L (ref 33–130)
ALT 1742-6: 61 U/L (ref 0–32)
AST 1920-8: 34 U/L (ref 0–35)
BILIRUB SERPL-MCNC: 0.5 MG/DL (ref 0.2–1.2)
BUN SERPL-MCNC: 11 MG/DL (ref 7–25)
BUN/CREATININE RATIO: 14.3 (ref 6–22)
CALCIUM SERPL-MCNC: 9.8 MG/DL (ref 8.6–10.3)
CHLORIDE SERPLBLD-SCNC: 102.5 MMOL/L (ref 98–110)
CO2 SERPL-SCNC: 26.9 MMOL/L (ref 20–32)
CREAT SERPL-MCNC: 0.77 MG/DL (ref 0.6–1.3)
CREATININE URINE MG/DL: 100 MG/DL
GLOBULIN, CALCULATED - QUEST: 2.2 (ref 1.9–3.7)
GLUCOSE SERPL-MCNC: 185 MG/DL (ref 60–99)
HBA1C MFR BLD: 7.1 % (ref 4–7)
POTASSIUM SERPL-SCNC: 4.95 MMOL/L (ref 3.5–5.3)
PROT SERPL-MCNC: 6.8 G/DL (ref 6.1–8.1)
SODIUM SERPL-SCNC: 138.9 MMOL/L (ref 135–146)

## 2021-12-01 PROCEDURE — 82043 UR ALBUMIN QUANTITATIVE: CPT | Performed by: PHYSICIAN ASSISTANT

## 2021-12-01 PROCEDURE — 36415 COLL VENOUS BLD VENIPUNCTURE: CPT | Performed by: PHYSICIAN ASSISTANT

## 2021-12-01 PROCEDURE — 82570 ASSAY OF URINE CREATININE: CPT | Performed by: PHYSICIAN ASSISTANT

## 2021-12-01 PROCEDURE — 80053 COMPREHEN METABOLIC PANEL: CPT | Performed by: PHYSICIAN ASSISTANT

## 2021-12-01 PROCEDURE — 83036 HEMOGLOBIN GLYCOSYLATED A1C: CPT | Performed by: PHYSICIAN ASSISTANT

## 2021-12-01 PROCEDURE — 99214 OFFICE O/P EST MOD 30 MIN: CPT | Performed by: PHYSICIAN ASSISTANT

## 2021-12-01 RX ORDER — ESCITALOPRAM OXALATE 10 MG/1
5 TABLET ORAL DAILY
Qty: 135 TABLET | Refills: 3 | Status: SHIPPED | OUTPATIENT
Start: 2021-12-01 | End: 2021-12-16

## 2021-12-01 RX ORDER — LISINOPRIL AND HYDROCHLOROTHIAZIDE 12.5; 2 MG/1; MG/1
1 TABLET ORAL DAILY
Qty: 90 TABLET | Refills: 3 | Status: SHIPPED | OUTPATIENT
Start: 2021-12-01 | End: 2022-12-07

## 2021-12-01 ASSESSMENT — ANXIETY QUESTIONNAIRES
7. FEELING AFRAID AS IF SOMETHING AWFUL MIGHT HAPPEN: SEVERAL DAYS
GAD7 TOTAL SCORE: 3
5. BEING SO RESTLESS THAT IT IS HARD TO SIT STILL: NOT AT ALL
6. BECOMING EASILY ANNOYED OR IRRITABLE: SEVERAL DAYS
1. FEELING NERVOUS, ANXIOUS, OR ON EDGE: SEVERAL DAYS
3. WORRYING TOO MUCH ABOUT DIFFERENT THINGS: NOT AT ALL
IF YOU CHECKED OFF ANY PROBLEMS ON THIS QUESTIONNAIRE, HOW DIFFICULT HAVE THESE PROBLEMS MADE IT FOR YOU TO DO YOUR WORK, TAKE CARE OF THINGS AT HOME, OR GET ALONG WITH OTHER PEOPLE: NOT DIFFICULT AT ALL
2. NOT BEING ABLE TO STOP OR CONTROL WORRYING: NOT AT ALL

## 2021-12-01 ASSESSMENT — PATIENT HEALTH QUESTIONNAIRE - PHQ9: 5. POOR APPETITE OR OVEREATING: NOT AT ALL

## 2021-12-01 ASSESSMENT — MIFFLIN-ST. JEOR: SCORE: 2298.13

## 2021-12-01 NOTE — PROGRESS NOTES
"CC: Medication Check    History:  Type 1 Diabetes:  Goes to endocrinology, but visits have been virtual, so would like to have A1c checked here. Also due for microalbuminuria test. Feels that glucose have been relatively stable, but did have somewhat worse levels the past few months going through difficult break up.     HTN:  Pt is treated for HTN. Taking lisinopril-hctz. Has been taking consistently. Denies any side effects. Does check BP at home, getting 120s/70-80s. Last eye exam was last year. Denies any chest pain, palpitations, SOB. Has been active with walking. Weight has been increasing. Up 11 lbs since 12/2020 visit.     Anxiety, depression:  Takes escitalopram 15 mg daily. After having some increased stress this past summer increased from 10 mg to 15 mg 8/2021. This was a good change, but did have end of relationship recently, where he has been having low mood, not motivation for the past 4 months. Does feel like he is doing better overall. Continues to see therapist once monthly. No SI/HI.     Foot concern:  Enrique recently developed a change on the bottom of right foot. Started with blood blister 1 month ago. Popped open 2 weeks ago, and started to scab over, but then continues to break open. No discharge. Has been keeping covered, applying antibiotic ointment, no pain. Does seem to have a change in sensation.     PMH, MEDICATIONS, ALLERGIES, SOCIAL AND FAMILY HISTORY in [facility] and reviewed by me personally.    ROS negative other than the symptoms noted above in the HPI.      Examination   /86 (BP Location: Left arm, Patient Position: Sitting, Cuff Size: Adult Large)   Pulse 83   Temp 98.2  F (36.8  C) (Temporal)   Ht 1.746 m (5' 8.75\")   Wt 135.2 kg (298 lb)   SpO2 98%   BMI 44.33 kg/m       Constitutional: Sitting comfortably, in no acute distress. Vital signs noted  Neck:  no adenopathy, trachea midline and normal to palpation, thyroid normal to palpation  Cardiovascular:  regular rate and " rhythm, no murmurs, clicks, or gallops  Respiratory:  normal respiratory rate and rhythm, lungs clear to auscultation  SKIN: No jaundice/pallor/rash.   Psychiatric: mentation appears normal and affect normal/bright  Diabetic foot exam: No ulcer formation, but does have callous formation with adjacent blister, with clotted blood in superficial layers. Minimal serosanguinous drainage. No surrounding erythema. Monofilament test decreased at base of toes bilaterally.    A/P    ICD-10-CM    1. Type 1 diabetes mellitus without complication (H)  E10.9 lisinopril-hydrochlorothiazide (ZESTORETIC) 20-12.5 MG tablet     VENOUS COLLECTION     HEMOGLOBIN A1C (BFP)     FOOT EXAM  NO CHARGE [05782.114]     ALBUMIN RANDOM URINE QUANTITATIVE (BFP)     Comprehensive Metobolic Panel (BFP)   2. Benign essential hypertension  I10 lisinopril-hydrochlorothiazide (ZESTORETIC) 20-12.5 MG tablet     Comprehensive Metobolic Panel (BFP)   3. Generalized anxiety disorder with panic attacks  F41.1 escitalopram (LEXAPRO) 10 MG tablet    F41.0    4. Morbid obesity (H)  E66.01        DISCUSSION:  Type 1 Diabetes:  A1c today remains stable at 7.1%. Can continue with current regime, until endocrinology review next month. Not fasting today, so can't check lipid panel, but will update CMP, microalbuminuria today.     HTN:  BP nearly controlled today, but not quite meeting goal <130/80, but I am reassured by home numbers showing control. Will recheck CMP, and send MyChart with results. Will refill current medication without change for 1 year.     Anxiety, depression:  PHQ/ANTONETTE updated today, showing good control. Will refill escitalopram 15 mg daily for 1 year. Contact me sooner with concerns.    Foot concern:   Consistent with blister formation that I suspect was in the setting of callus formation. Enrique has already identified that soles of footwear was worn down, and has gotten better soles at this time. Can continue to apply donut around callus to  remove pressure. No evidence of infection at this time, but contact me if worsening. Will refer to podiatry. May need debridement, orthotics    Obesity:  Continues to work on better eating, exercising more, and has lost 8 lbs in the past 2 weeks. Had been down to 270 lbs.     follow up visit: 6 months    Time of visit: 35 minutes    Verito Molina PA-C  [facility]

## 2021-12-01 NOTE — NURSING NOTE
Chief Complaint   Patient presents with     Recheck Medication     Toe Pain     blood blister from a few weeks ago has broke open and not healing well, it has scabbed         Pre-visit Screening:  Immunizations:  up to date  Colonoscopy:  NA  Mammogram: NA  Asthma Action Test/Plan:  NA  PHQ9:  Done today  GAD7:  Done today  Questioned patient about current smoking habits Pt. has never smoked.  Ok to leave detailed message on voice mail for today's visit only Yes, phone # [phone]

## 2021-12-02 ASSESSMENT — PATIENT HEALTH QUESTIONNAIRE - PHQ9: SUM OF ALL RESPONSES TO PHQ QUESTIONS 1-9: 4

## 2021-12-02 ASSESSMENT — ANXIETY QUESTIONNAIRES: GAD7 TOTAL SCORE: 3

## 2021-12-06 ENCOUNTER — MYC MEDICAL ADVICE (OUTPATIENT)
Dept: FAMILY MEDICINE | Facility: CLINIC | Age: 31
End: 2021-12-06

## 2021-12-06 DIAGNOSIS — E10.9 TYPE 1 DIABETES MELLITUS WITHOUT COMPLICATION (H): ICD-10-CM

## 2021-12-16 ENCOUNTER — MYC MEDICAL ADVICE (OUTPATIENT)
Dept: FAMILY MEDICINE | Facility: CLINIC | Age: 31
End: 2021-12-16

## 2021-12-16 DIAGNOSIS — F41.1 GENERALIZED ANXIETY DISORDER WITH PANIC ATTACKS: ICD-10-CM

## 2021-12-16 DIAGNOSIS — F41.0 GENERALIZED ANXIETY DISORDER WITH PANIC ATTACKS: ICD-10-CM

## 2021-12-16 RX ORDER — ESCITALOPRAM OXALATE 10 MG/1
15 TABLET ORAL DAILY
Qty: 135 TABLET | Refills: 3 | Status: SHIPPED | OUTPATIENT
Start: 2021-12-16 | End: 2022-12-07

## 2021-12-16 NOTE — TELEPHONE ENCOUNTER
Gonzalez Gibbs is requesting a refill of:    Pending Prescriptions:                       Disp   Refills    escitalopram (LEXAPRO) 10 MG tablet       135 ta*3            Sig: Take 1.5 tablets (15 mg) by mouth daily    Please close encounter if RX was sent. Thanks, Jodi

## 2021-12-29 ENCOUNTER — TELEPHONE (OUTPATIENT)
Dept: ENDOCRINOLOGY | Facility: CLINIC | Age: 31
End: 2021-12-29
Payer: COMMERCIAL

## 2021-12-29 NOTE — TELEPHONE ENCOUNTER
Fax received from [employer] [facility] W/NDL FOR EXT INS PUMP STR for review and signature.  Put in Dr. Katz's in basket.

## 2021-12-29 NOTE — TELEPHONE ENCOUNTER
Elliot form for SYR W/ NDL FOR EXT INS  STR    LAST OFFICE/VIRTUAL VISIT:  01/11/21    FUTURE OFFICE/VIRTUAL VISIT:  01/10/22    Lab Results   Component Value Date    A1C 7.1 12/01/2021    A1C 7.2 12/15/2020    A1C 7.5 07/23/2020    A1C 7.0 06/17/2020    A1C 7.0 02/03/2020         Estrella Laura CMA  [facility]  Karena/Lila

## 2022-01-06 ENCOUNTER — MEDICAL CORRESPONDENCE (OUTPATIENT)
Dept: HEALTH INFORMATION MANAGEMENT | Facility: CLINIC | Age: 32
End: 2022-01-06
Payer: COMMERCIAL

## 2022-01-06 NOTE — TELEPHONE ENCOUNTER
Form was faxed and sent to scanning.      Estrella Laura, [facility]  Karena/Lila

## 2022-01-07 ENCOUNTER — MYC MEDICAL ADVICE (OUTPATIENT)
Dept: ENDOCRINOLOGY | Facility: CLINIC | Age: 32
End: 2022-01-07
Payer: COMMERCIAL

## 2022-01-07 NOTE — TELEPHONE ENCOUNTER
LVM for patient regarding device upload for upcoming virtual visit. [employer] msg sent as well.    Juany ALVAREZ MA

## 2022-01-13 ENCOUNTER — TELEPHONE (OUTPATIENT)
Dept: ENDOCRINOLOGY | Facility: CLINIC | Age: 32
End: 2022-01-13
Payer: COMMERCIAL

## 2022-01-13 NOTE — TELEPHONE ENCOUNTER
Fax received from [employer] [facility] 01/16/22 for review and signature.  Put in Dr. Katz's in basket.      '

## 2022-01-24 NOTE — TELEPHONE ENCOUNTER
Elliot form for INF SET FOR EXT INS  N-NEED    LAST OFFICE/VIRTUAL VISIT:  01/11/21    FUTURE OFFICE/VIRTUAL VISIT:  None    Lab Results   Component Value Date    A1C 7.1 12/01/2021    A1C 7.2 12/15/2020    A1C 7.5 07/23/2020    A1C 7.0 06/17/2020    A1C 7.0 02/03/2020         Estrella Laura CMA  [facility]  Karena/Lila

## 2022-01-26 NOTE — TELEPHONE ENCOUNTER
Form was faxed and sent to scanning.      Estrella Laura, [facility]  Karena/Lila

## 2022-01-27 ENCOUNTER — MEDICAL CORRESPONDENCE (OUTPATIENT)
Dept: HEALTH INFORMATION MANAGEMENT | Facility: CLINIC | Age: 32
End: 2022-01-27
Payer: COMMERCIAL

## 2022-02-12 ENCOUNTER — HEALTH MAINTENANCE LETTER (OUTPATIENT)
Age: 32
End: 2022-02-12

## 2022-02-24 DIAGNOSIS — E10.9 TYPE 1 DIABETES MELLITUS WITHOUT COMPLICATION (H): ICD-10-CM

## 2022-03-10 ENCOUNTER — TELEPHONE (OUTPATIENT)
Dept: FAMILY MEDICINE | Facility: CLINIC | Age: 32
End: 2022-03-10

## 2022-03-18 NOTE — NURSING NOTE
Chief Complaint   Patient presents with     Consult     panic attacks- use to have as a kid and has had them for past two days, anxiety (been dealing with for a long time)     Pre-visit Screening:  Immunizations:  up to date  Colonoscopy:  NA  Mammogram: NA  Asthma Action Test/Plan:  NA  PHQ9:  given  GAD7:  given  Questioned patient about current smoking habits Pt. has never smoked.  Ok to leave detailed message on voice mail for today's visit only yes, phone # [phone]       [General Appearance - Well Developed] : well developed [General Appearance - Well Nourished] : well nourished [Normal Appearance] : normal appearance [Well Groomed] : well groomed [General Appearance - In No Acute Distress] : no acute distress [Abdomen Soft] : soft [Abdomen Tenderness] : non-tender [Costovertebral Angle Tenderness] : no ~M costovertebral angle tenderness [Urethral Meatus] : meatus normal [Urinary Bladder Findings] : the bladder was normal on palpation [Scrotum] : the scrotum was normal [Testes Mass (___cm)] : there were no testicular masses [Edema] : no peripheral edema [] : no respiratory distress [Respiration, Rhythm And Depth] : normal respiratory rhythm and effort [Exaggerated Use Of Accessory Muscles For Inspiration] : no accessory muscle use [Oriented To Time, Place, And Person] : oriented to person, place, and time [Affect] : the affect was normal [Mood] : the mood was normal [Not Anxious] : not anxious [Normal Station and Gait] : the gait and station were normal for the patient's age [No Focal Deficits] : no focal deficits [No Palpable Adenopathy] : no palpable adenopathy [FreeTextEntry1] : wounds clean

## 2022-03-19 DIAGNOSIS — E10.9 TYPE 1 DIABETES MELLITUS WITHOUT COMPLICATION (H): ICD-10-CM

## 2022-03-26 DIAGNOSIS — E10.42 TYPE 1 DIABETES MELLITUS WITH DIABETIC POLYNEUROPATHY (H): ICD-10-CM

## 2022-03-28 RX ORDER — PROCHLORPERAZINE 25 MG/1
SUPPOSITORY RECTAL
Qty: 1 EACH | Refills: 0 | Status: SHIPPED | OUTPATIENT
Start: 2022-03-28 | End: 2022-04-27

## 2022-04-11 DIAGNOSIS — E10.9 TYPE 1 DIABETES MELLITUS WITHOUT COMPLICATION (H): ICD-10-CM

## 2022-04-14 ENCOUNTER — MYC REFILL (OUTPATIENT)
Dept: ENDOCRINOLOGY | Facility: CLINIC | Age: 32
End: 2022-04-14
Payer: COMMERCIAL

## 2022-04-14 DIAGNOSIS — E10.9 TYPE 1 DIABETES MELLITUS WITHOUT COMPLICATION (H): ICD-10-CM

## 2022-04-14 RX ORDER — INSULIN ASPART 100 [IU]/ML
INJECTION, SOLUTION INTRAVENOUS; SUBCUTANEOUS
Qty: 40 ML | Refills: 0 | Status: SHIPPED | OUTPATIENT
Start: 2022-04-14 | End: 2022-04-26

## 2022-04-19 NOTE — PROGRESS NOTES
Outcome for 04/19/22 2:22 PM: Data uploaded on Dexcom  VINNIE Peguero  Outcome for 04/21/22 11:10 AM: Dexcom emailed to provider  VINNIE Peguero

## 2022-04-25 ENCOUNTER — LAB (OUTPATIENT)
Dept: LAB | Facility: CLINIC | Age: 32
End: 2022-04-25
Payer: COMMERCIAL

## 2022-04-25 DIAGNOSIS — E10.9 TYPE 1 DIABETES MELLITUS WITHOUT COMPLICATION (H): ICD-10-CM

## 2022-04-25 LAB
CHOLEST SERPL-MCNC: 180 MG/DL
CREAT SERPL-MCNC: 0.76 MG/DL (ref 0.66–1.25)
CREAT UR-MCNC: 62 MG/DL
FASTING STATUS PATIENT QL REPORTED: YES
GFR SERPL CREATININE-BSD FRML MDRD: >90 ML/MIN/1.73M2
HBA1C MFR BLD: 7.2 % (ref 0–5.6)
HDLC SERPL-MCNC: 44 MG/DL
LDLC SERPL CALC-MCNC: 86 MG/DL
MICROALBUMIN UR-MCNC: 9 MG/L
MICROALBUMIN/CREAT UR: 14.52 MG/G CR (ref 0–17)
NONHDLC SERPL-MCNC: 136 MG/DL
TRIGL SERPL-MCNC: 248 MG/DL
TSH SERPL DL<=0.005 MIU/L-ACNC: 1.1 MU/L (ref 0.4–4)

## 2022-04-25 PROCEDURE — 82565 ASSAY OF CREATININE: CPT

## 2022-04-25 PROCEDURE — 36415 COLL VENOUS BLD VENIPUNCTURE: CPT

## 2022-04-25 PROCEDURE — 80061 LIPID PANEL: CPT

## 2022-04-25 PROCEDURE — 84443 ASSAY THYROID STIM HORMONE: CPT

## 2022-04-25 PROCEDURE — 82043 UR ALBUMIN QUANTITATIVE: CPT

## 2022-04-25 PROCEDURE — 83036 HEMOGLOBIN GLYCOSYLATED A1C: CPT

## 2022-04-26 ENCOUNTER — VIRTUAL VISIT (OUTPATIENT)
Dept: ENDOCRINOLOGY | Facility: CLINIC | Age: 32
End: 2022-04-26
Payer: COMMERCIAL

## 2022-04-26 DIAGNOSIS — E10.42 TYPE 1 DIABETES MELLITUS WITH DIABETIC POLYNEUROPATHY (H): Primary | ICD-10-CM

## 2022-04-26 DIAGNOSIS — E10.65 UNCONTROLLED TYPE 1 DIABETES MELLITUS WITH HYPERGLYCEMIA (H): ICD-10-CM

## 2022-04-26 DIAGNOSIS — E10.9 TYPE 1 DIABETES MELLITUS WITHOUT COMPLICATION (H): ICD-10-CM

## 2022-04-26 DIAGNOSIS — I10 BENIGN ESSENTIAL HYPERTENSION: ICD-10-CM

## 2022-04-26 DIAGNOSIS — E78.1 HIGH TRIGLYCERIDES: ICD-10-CM

## 2022-04-26 PROCEDURE — 99214 OFFICE O/P EST MOD 30 MIN: CPT | Mod: 95 | Performed by: INTERNAL MEDICINE

## 2022-04-26 PROCEDURE — 95251 CONT GLUC MNTR ANALYSIS I&R: CPT | Performed by: INTERNAL MEDICINE

## 2022-04-26 RX ORDER — IBUPROFEN 200 MG
TABLET ORAL
Qty: 50 EACH | Status: SHIPPED | OUTPATIENT
Start: 2022-04-26 | End: 2022-12-07

## 2022-04-26 RX ORDER — INSULIN ASPART 100 [IU]/ML
INJECTION, SOLUTION INTRAVENOUS; SUBCUTANEOUS
Qty: 40 ML | Refills: 3 | Status: SHIPPED | OUTPATIENT
Start: 2022-04-26 | End: 2022-05-09

## 2022-04-26 NOTE — PROGRESS NOTES
Enrique is a 32 year old who is being evaluated via a billable video visit.      How would you like to obtain your AVS? [employer]  If the video visit is dropped, the invitation should be resent by: Text to cell phone: [phone]  Will anyone else be joining your video visit? No

## 2022-04-26 NOTE — PROGRESS NOTES
"THIS IS A VIDEO VISIT:    Phone call visit/virtual visit encounter:    Name of patient: Gonzalez Gibbs    Date of encounter: 4/26/2022    Time of start of video visit: 2:00    Video started: 2:11    Video ended: 2:26    Provider location: working from home/ [facility]    Patient location: patients home.    Mode of transmission: [employer] video/ [employer]    Verbal consent: obtained before starting visit. Pt is agreeable.      The patient has been notified of following:      \"This VIDEO visit will be conducted via a call between you and your physician/provider. We have found that certain health care needs can be provided without the need for a physical exam.  This service lets us provide the care you need with a short phone conversation.  If a prescription is necessary we can send it directly to your pharmacy.  If lab work is needed we can place an order for that and you can then stop by our lab to have the test done at a later time.     With new updates with corona virus patient might be billed as clinic visit.     If during the course of the call the physician/provider feels a telephone visit is not appropriate, you will not be charged for this service.\"      Past medical history, social history, family history, allergy and medications were reviewed and updated as appropriate.  Reviewed pertinent labs, notes, imaging studies personally.    Endocrinology Clinic Note:  Name: Gonzalez Gibbs  Seen for Diabetes f/u.   HPI:  Gonzalez Gibbs is a 32 year old male who presents for the evaluation/management of type 1 diabetes.  Was getting care at end[facility]ology clinic of [facility] since last 2016 visit. Records requested.  Was On insulin pump for last 10-12 years.    Now using TANDEM T-slim insulin pump.   Using Novolog in pump.  Using  insulin pump with Dexcom.  CGM data reviewed.  Typically skips breakfast. Has lunch at 1:00 PM and dinner is at 5:30 PM- 7:00 PM. Snacks at 9:00 Pm lke fruits, " fruit snacks.    Started new job.  Lost 15 lbs in last 3 months.  He works as  at a company.  Walks about 14073 and 34223 steps/day at work.    Reports that he has long acting insulin in case of pump malfunction at home. Gonzalez also has glucagon kit at home for emergency.         1. Type 1 DM:  Orginally diagnosed at the age of: 13 years   Hospitalization for DKA: None  Current Regimen:Tandem  Insulin pump and metformin 1000 mg BID  BS checks: 3-4 times a day  Average Meter Download: Blood glucose data reviewed personally. See nursing note from this encounter for details.  Exercise: Few times a week  Last A1c: 7.2%  Episodes of hypoglycemia (low blood sugar): Occasional.  Gets symptoms  Fixed meal pattern: Yes  Patient counting carbs: Yes  Using PUMP: Yes.   Current Regimen:   (pump settings reviewed with pt on video)    Insulin pump -   Time Rate (U/hr)   0000-  2.6   0700  2.65   1800  2.6   2100  2.5     Carbohydrate Ratio -    Time Ratio   0000-  3   0700  1800  2100  1.5   1.8   3     Sensitivity   18   Active Insulin Time   hours   Basal    Bolus     Total Carbohydrates/day       Total Insulin/day       Average Blood Sugar                  DM Complications:   Nephropathy: No  Retinopathy: No   Neuropathy: No  Microalbuminuria: No  CAD/PAD: No  Gastroparesis: No  Hypoglycemia unawareness: No     2. Hypertension: Blood Pressure today:   BP Readings from Last 3 Encounters:   12/01/21 130/86   12/15/20 124/80   06/17/20 134/82      Blood pressure medications include lisinopril 10 mg.    3. Hyperlipidemia:  Not on medication.     PMH/PSH:  Past Medical History:   Diagnosis Date     Family history of diabetes mellitus      Mild intermittent asthma     EIA     Mild intermittent asthma     EIA     Type I diabetes mellitus, uncontrolled (H) 6/13/2016     Past Surgical History:   Procedure Laterality Date     HC REPAIR ING HERNIA,6MO-5YR,REDUC  1992    also umbilical hernia     ZZHC CREATE EARDRUM  OPENING,GEN ANESTH      P.E. Tubes/ 4-5 surgeries, annual from age 10)     Family Hx:  Family History   Problem Relation Age of Onset     Hypertension Father      Depression Mother      Diabetes Paternal Uncle      Diabetes Other         2nd cousins     Cancer No family hx of      Heart Disease No family hx of            DM2:             Social Hx:  Social History     Socioeconomic History     Marital status: Single     Spouse name: Not on file     Number of children: Not on file     Years of education: Not on file     Highest education level: Not on file   Occupational History     Occupation: [employer]   Tobacco Use     Smoking status: Never Smoker     Smokeless tobacco: Never Used   Substance and Sexual Activity     Alcohol use: Yes     Alcohol/week: 0.0 standard drinks     Drug use: No     Sexual activity: Not Currently   Other Topics Concern      Service Not Asked     Blood Transfusions Not Asked     Caffeine Concern Not Asked     Occupational Exposure Not Asked     Hobby Hazards Not Asked     Sleep Concern Not Asked     Stress Concern Not Asked     Weight Concern Not Asked     Special Diet Not Asked     Back Care Not Asked     Exercise Yes     Bike Helmet Not Asked     Seat Belt Yes     Self-Exams Not Asked     Parent/sibling w/ CABG, MI or angioplasty before 65F 55M? Not Asked   Social History Narrative     Not on file     Social Determinants of Health     Financial Resource Strain: Not on file   Food Insecurity: Not on file   Transportation Needs: Not on file   Physical Activity: Not on file   Stress: Not on file   Social Connections: Not on file   Intimate Partner Violence: Not on file   Housing Stability: Not on file          MEDICATIONS:  has a current medication list which includes the following prescription(s): acetaminophen, blood glucose, blood glucose monitoring, dexcom g6 , dexcom g6 sensor, dexcom g6 transmitter, escitalopram, glucagon emergency, insulin aspart, insulin glargine,  insulin syringe-needle u-100, lisinopril-hydrochlorothiazide, metformin, and order for dme.    ROS     ROS: 10 point ROS neg other than the symptoms noted above in the HPI.    Physical Exam   VS: There were no vitals taken for this visit.   GENERAL: healthy, alert and no distress  EYES: Eyes grossly normal to inspection, conjunctivae and sclerae normal  ENT: no nose swelling, nasal discharge.  Thyroid: no apparent thyroid nodules  RESP: no audible wheeze, cough, or visible cyanosis.  No visible retractions or increased work of breathing.  Able to speak fully in complete sentences.  ABDO: not evaluated.  EXTREMITIES: no hand tremors.  NEURO: Cranial nerves grossly intact, mentation intact and speech normal  SKIN: No apparent skin lesions, rash or edema seen   PSYCH: mentation appears normal, affect normal/bright, judgement and insight intact, normal speech and appearance well-groomed    LABS:  A1c:  Lab Results   Component Value Date    A1C 7.2 04/25/2022    A1C 7.1 12/01/2021    A1C 7.2 12/15/2020    A1C 7.5 07/23/2020    A1C 7.0 06/17/2020    A1C 7.0 02/03/2020       BMP:  Creatinine   Date Value Ref Range Status   04/25/2022 0.76 0.66 - 1.25 mg/dL Final   12/01/2021 0.77 0.60 - 1.30 mg/dL Final       Urine Micro:  Lab Results   Component Value Date    UMALCR 14.52 04/25/2022    UMALCR <30 12/01/2021        LFTs/Lipids:  Recent Labs   Lab Test 04/25/22  1008 12/15/20  0000 07/23/20  1147 06/17/20  0000   CHOL 180 204*   < > 209*   HDL 44 51   < > 53   LDL 86 116   < > 121   TRIG 248* 183*   < > 174*   CHOLHDLRATIO  --  4  --  4    < > = values in this interval not displayed.       TFTs:  TSH   Date Value Ref Range Status   04/25/2022 1.10 0.40 - 4.00 mU/L Final   06/15/2017 1.67 0.30 - 5.0 mcU/mL Final         Glucometer/ insulin pump (if applicable)/ CGM data (if applicable) downloaded, Personally reviewed and interpreted.  All Blood sugar data reviewed personally and discussed with pt.  See nursing note from  today's clinic visit for details of BG/CGM log.  All pertinent notes, labs, and images personally reviewed by me.     A/P  Mr.Christopher ANTONIO Gibbs is a 30 year old here for the evaluation/management of diabetes:    1. DM1 - Under Poor control.  A1c 7.2%.  No complications associated with diabetes.  BG high.  No major episodes of hypoglycemia noted/reported.   Plan:  Discussed diagnosis, pathophysiology, management and treatment options of condition with pt.  Increase in basal rate overnight.  Rest settings same.  Fasting labs in 3 months.  Please make a lab appointment for blood work and follow up clinic appointment in 1 week after that to discuss results.  Please co-ordinate with [facility] staff to add Tandem account.  Continue to use Tandem insulin pump and Dexcom.    New basal rate:  Time Rate (U/hr)   0000-  2.6-->2.65   0700  2.65   1800  2.6   2100  2.5       Recommend checking blood sugars before meals and at bedtime.    If Blood glucose are low more often-> 2-3 times/week- give us a call.  Make better food choices: reduce carbs, Reduce portion size, weight loss and exercise 3-4 times a week.      2. Hypertension - Takes lisinopril 10 mg    3. Hyperlipidemia - Not on medication.  High TG.  Discussed diagnosis, pathophysiology, management and treatment options of condition with pt.  Recommend strict BG control.  FLP in 3 months.   4. Prevention  Opthalmology: Due for eye examination  ASA-no 2/2 to age  Smoking- no    Most Recent Immunizations   Administered Date(s) Administered     COVID-19,PF,Pfizer (12+ Yrs) 04/20/2021     Flu, Unspecified 10/12/2019     HEPA 11/01/2008     HepA-Adult 06/03/2019     HepB 01/09/2004     Hib (PRP-T) 05/27/1992     Historical DTP/aP 01/01/1996     Influenza (H1N1) 12/08/2009     Influenza (IIV3) PF 10/17/2012     Influenza Vaccine IM > 6 months Valent IIV4 (Alfuria,Fluzone) 01/07/2019     Influenza,INJ,MDCK,PF,Quad >4yrs 09/15/2020     MMR 11/14/2003     OPV, trivalent, live  12/13/1995     Pneumococcal 23 valent 11/18/2017     TD (ADULT, 7+) 11/14/2003     Tdap (Adacel,Boostrix) 11/11/2014     Recommend checking blood sugars before meals and at bedtime.    If Blood glucose are low more often-> 2-3 times/week- give us a call.  The patient is advised to Make better food choices: reduce carbs, Reduce portion size, weight loss and exercise 3-4 times a week.  Discussed hypoglycemia signs and symptoms as well as management in detail.      Follow-up:  Follow up in 3 months    Bridgette Katz MD  Endocrinology   [facility]  CC: Jacey Mon    All questions were answered.  The patient indicates understanding of the above issues and agrees with the plan set forth.     Disclaimer: This note consists of symbols derived from keyboarding, dictation and/or voice recognition software. As a result, there may be errors in the script that have gone undetected. Please consider this when interpreting information found in this chart.

## 2022-04-26 NOTE — PATIENT INSTRUCTIONS
-[facility]  Dr Katz, Endocrinology Department    [facility]   [address] [facility]  [place] [address]  Appointment [phone]  Fax: [phone]  [place]: Monday - Thursday      To provide the best diabetic care, please bring your blood glucose meter to each and every visit with your Endocrinologist.  Your blood glucose meter/insulin pump will be downloaded at every appointment.    Please arrive 15 minutes before your scheduled appointment.  This will allow for your blood glucose meter/insulin pump to be downloaded.  If you are wearing DEXCOM please bring  or sharing code so that it can be downloaded.  If you are using freestyle steve personal sensors please bring the reader.  If you are using TANDEM insulin pump please have your username and password to get info from Tandem website.    Increase in basal rate overnight.  Rest settings same.  Fasting labs in 3 months.  Please make a lab appointment for blood work and follow up clinic appointment in 1 week after that to discuss results.  Please co-ordinate with [facility] staff to add Tandem account.  Continue to use Tandem insulin pump and Dexcom.    New basal rate:  Time Rate (U/hr)   0000-  2.6-->2.65   0700  2.65   1800  2.6   2100  2.5       Recommend checking blood sugars before meals and at bedtime.    If Blood glucose are low more often-> 2-3 times/week- give us a call.  Make better food choices: reduce carbs, Reduce portion size, weight loss and exercise 3-4 times a week.    What is hypoglycemia:  Hypoglycemia is when blood sugar levels become too low - below 70 m/dl.      What causes hypoglycemia?  - using too much insulin  -taking too many diabetes pills  -not eating enough, or skipping meals or snacks  -not eating enough carbohydrate with meals  -changing your exercise routine  -drinking alcohol in excess    It is also possible to have hypoglycemia even when you are carefully managing  your blood sugar levels.    What does it feel like when blood sugars get too low?  You may feel:  - anxious  -confused  -dizzy  -hungry  -light-headed  -nervous  -shaky  -sleepy  -sweaty    You may have  -blurred or cloudy vision  -heart palpitations (heart skips a beat or races)  -tingling or numbness around the mouth and tongue  -tremors    What to do if you have symptoms of hypoglycmemia:  If you think your blood sugar is too low, check it with a glucose meter.  If its below 70 mg/dl, consume one of the following:  Fruit juice (1/2 cup)  Glucose tablets (15 grams)  Hard candy (5 to 7 pieces)  Honey or sugar (2 teaspoons)  Milk (1/2 cup)  Soft drink (non-diet, 1/2 cup)    Wait 15 minutes and check your blood glucose again.  IF it is still below 70 mg/dl, have another food item listed above. Wait another 15 minutes and repeat the blood glucose test.  Have a small meal or snack that contains some carbohydrate after your blood glucose rises above 70 mg/dl.    If you are at risk of hypoglycemia, always carry with you glucose tablets or one of the foods listed above.      To prevent Hypoglycemia:  Avoid situations that may cause hypoglycemia  Before making any change to your diet or exercise routine, discuss them with your healthcare provider  Keep a record of your blood glucose levels.  Include the time of day, diabetes medications, when you had your last meal or snack, and what you were doing at the time (e.g. Watching TV, gardening, jogging, etc).    Talk to your healthcare provider if your blood glucose levels are often low        Patient guide on hypoglycemia    http://www.hormone.org/Resources/upload/patient-guide-diagnosis-and-management-hypoglycemia-627633.pdf

## 2022-04-26 NOTE — LETTER
"    4/26/2022         RE: Gonzalez Gibbs  [address]  [facility] [address]        Dear Colleague,    Thank you for referring your patient, Gonzalez Gibbs, to the [facility]. Please see a copy of my visit note below.    Outcome for 04/19/22 2:22 PM: Data uploaded on Dexcom  VINNIE Peguero  Outcome for 04/21/22 11:10 AM: Dexcom emailed to provider  VINNIE Peguero          Enrique is a 32 year old who is being evaluated via a billable video visit.      How would you like to obtain your AVS? MyChart  If the video visit is dropped, the invitation should be resent by: Text to cell phone: [phone]  Will anyone else be joining your video visit? No    THIS IS A VIDEO VISIT:    Phone call visit/virtual visit encounter:    Name of patient: Gonzalez Gibbs    Date of encounter: 4/26/2022    Time of start of video visit: 2:00    Video started: 2:11    Video ended: 2:26    Provider location: working from home/ [facility]    Patient location: patients home.    Mode of transmission: [employer] video/ [employer]    Verbal consent: obtained before starting visit. Pt is agreeable.      The patient has been notified of following:      \"This VIDEO visit will be conducted via a call between you and your physician/provider. We have found that certain health care needs can be provided without the need for a physical exam.  This service lets us provide the care you need with a short phone conversation.  If a prescription is necessary we can send it directly to your pharmacy.  If lab work is needed we can place an order for that and you can then stop by our lab to have the test done at a later time.     With new updates with corona virus patient might be billed as clinic visit.     If during the course of the call the physician/provider feels a telephone visit is not appropriate, you will not be charged for this service.\"      Past medical history, social history, family " history, allergy and medications were reviewed and updated as appropriate.  Reviewed pertinent labs, notes, imaging studies personally.    Endocrinology Clinic Note:  Name: Gonzalez Gibbs  Seen for Diabetes f/u.   HPI:  Gonzalez Gibbs is a 32 year old male who presents for the evaluation/management of type 1 diabetes.  Was getting care at endocrinology clinic of [place] since last 2016 visit. Records requested.  Was On insulin pump for last 10-12 years.    Now using TANDEM T-slim insulin pump.   Using Novolog in pump.  Using  insulin pump with Dexcom.  CGM data reviewed.  Typically skips breakfast. Has lunch at 1:00 PM and dinner is at 5:30 PM- 7:00 PM. Snacks at 9:00 Pm lke fruits, fruit snacks.    Started new job.  Lost 15 lbs in last 3 months.  He works as  at a company.  Walks about [address] and [address]6965 steps/day at work.    Reports that he has long acting insulin in case of pump malfunction at home. Gonzalez also has glucagon kit at home for emergency.         1. Type 1 DM:  Orginally diagnosed at the age of: 13 years   Hospitalization for DKA: None  Current Regimen:Tandem  Insulin pump and metformin 1000 mg BID  BS checks: 3-4 times a day  Average Meter Download: Blood glucose data reviewed personally. See nursing note from this encounter for details.  Exercise: Few times a week  Last A1c: 7.2%  Episodes of hypoglycemia (low blood sugar): Occasional.  Gets symptoms  Fixed meal pattern: Yes  Patient counting carbs: Yes  Using PUMP: Yes.   Current Regimen:   (pump settings reviewed with pt on video)    Insulin pump -   Time Rate (U/hr)   0000-  2.6   0700  2.65   1800  2.6   2100  2.5     Carbohydrate Ratio -    Time Ratio   0000-  3   0700  1800  2100  1.5   1.8   3     Sensitivity   18   Active Insulin Time   hours   Basal    Bolus     Total Carbohydrates/day       Total Insulin/day       Average Blood Sugar                  DM Complications:   Nephropathy: No  Retinopathy: No    Neuropathy: No  Microalbuminuria: No  CAD/PAD: No  Gastroparesis: No  Hypoglycemia unawareness: No     2. Hypertension: Blood Pressure today:   BP Readings from Last 3 Encounters:   12/01/21 130/86   12/15/20 124/80   06/17/20 134/82      Blood pressure medications include lisinopril 10 mg.    3. Hyperlipidemia:  Not on medication.     PMH/PSH:  Past Medical History:   Diagnosis Date     Family history of diabetes mellitus      Mild intermittent asthma     EIA     Mild intermittent asthma     EIA     Type I diabetes mellitus, uncontrolled (H) 6/13/2016     Past Surgical History:   Procedure Laterality Date     HC REPAIR ING HERNIA,6MO-5YR,REDUC  1992    also umbilical hernia     ZZHC CREATE EARDRUM OPENING,GEN ANESTH      P.E. Tubes/ 4-5 surgeries, annual from age 10)     Family Hx:  Family History   Problem Relation Age of Onset     Hypertension Father      Depression Mother      Diabetes Paternal Uncle      Diabetes Other         2nd cousins     Cancer No family hx of      Heart Disease No family hx of            DM2:             Social Hx:  Social History     Socioeconomic History     Marital status: Single     Spouse name: Not on file     Number of children: Not on file     Years of education: Not on file     Highest education level: Not on file   Occupational History     Occupation: [employer]   Tobacco Use     Smoking status: Never Smoker     Smokeless tobacco: Never Used   Substance and Sexual Activity     Alcohol use: Yes     Alcohol/week: 0.0 standard drinks     Drug use: No     Sexual activity: Not Currently   Other Topics Concern      Service Not Asked     Blood Transfusions Not Asked     Caffeine Concern Not Asked     Occupational Exposure Not Asked     Hobby Hazards Not Asked     Sleep Concern Not Asked     Stress Concern Not Asked     Weight Concern Not Asked     Special Diet Not Asked     Back Care Not Asked     Exercise Yes     Bike Helmet Not Asked     Seat Belt Yes     Self-Exams Not Asked      Parent/sibling w/ CABG, MI or angioplasty before 65F 55M? Not Asked   Social History Narrative     Not on file     Social Determinants of Health     Financial Resource Strain: Not on file   Food Insecurity: Not on file   Transportation Needs: Not on file   Physical Activity: Not on file   Stress: Not on file   Social Connections: Not on file   Intimate Partner Violence: Not on file   Housing Stability: Not on file          MEDICATIONS:  has a current medication list which includes the following prescription(s): acetaminophen, blood glucose, blood glucose monitoring, dexcom g6 , dexcom g6 sensor, dexcom g6 transmitter, escitalopram, glucagon emergency, insulin aspart, insulin glargine, insulin syringe-needle u-100, lisinopril-hydrochlorothiazide, metformin, and order for dme.    ROS     ROS: 10 point ROS neg other than the symptoms noted above in the HPI.    Physical Exam   VS: There were no vitals taken for this visit.   GENERAL: healthy, alert and no distress  EYES: Eyes grossly normal to inspection, conjunctivae and sclerae normal  ENT: no nose swelling, nasal discharge.  Thyroid: no apparent thyroid nodules  RESP: no audible wheeze, cough, or visible cyanosis.  No visible retractions or increased work of breathing.  Able to speak fully in complete sentences.  ABDO: not evaluated.  EXTREMITIES: no hand tremors.  NEURO: Cranial nerves grossly intact, mentation intact and speech normal  SKIN: No apparent skin lesions, rash or edema seen   PSYCH: mentation appears normal, affect normal/bright, judgement and insight intact, normal speech and appearance well-groomed    LABS:  A1c:  Lab Results   Component Value Date    A1C 7.2 04/25/2022    A1C 7.1 12/01/2021    A1C 7.2 12/15/2020    A1C 7.5 07/23/2020    A1C 7.0 06/17/2020    A1C 7.0 02/03/2020       BMP:  Creatinine   Date Value Ref Range Status   04/25/2022 0.76 0.66 - 1.25 mg/dL Final   12/01/2021 0.77 0.60 - 1.30 mg/dL Final       Urine Micro:  Lab  Results   Component Value Date    UMALCR 14.52 04/25/2022    UMALCR <30 12/01/2021        LFTs/Lipids:  Recent Labs   Lab Test 04/25/22  1008 12/15/20  0000 07/23/20  1147 06/17/20  0000   CHOL 180 204*   < > 209*   HDL 44 51   < > 53   LDL 86 116   < > 121   TRIG 248* 183*   < > 174*   CHOLHDLRATIO  --  4  --  4    < > = values in this interval not displayed.       TFTs:  TSH   Date Value Ref Range Status   04/25/2022 1.10 0.40 - 4.00 mU/L Final   06/15/2017 1.67 0.30 - 5.0 mcU/mL Final         Glucometer/ insulin pump (if applicable)/ CGM data (if applicable) downloaded, Personally reviewed and interpreted.  All Blood sugar data reviewed personally and discussed with pt.  See nursing note from today's clinic visit for details of BG/CGM log.  All pertinent notes, labs, and images personally reviewed by me.     A/P  Mr.Christopher ANTONIO Gibbs is a 30 year old here for the evaluation/management of diabetes:    1. DM1 - Under Poor control.  A1c 7.2%.  No complications associated with diabetes.  BG high.  No major episodes of hypoglycemia noted/reported.   Plan:  Discussed diagnosis, pathophysiology, management and treatment options of condition with pt.  Increase in basal rate overnight.  Rest settings same.  Fasting labs in 3 months.  Please make a lab appointment for blood work and follow up clinic appointment in 1 week after that to discuss results.  Please co-ordinate with [facility] staff to add Tandem account.  Continue to use Tandem insulin pump and Dexcom.    New basal rate:  Time Rate (U/hr)   0000-  2.6-->2.65   0700  2.65   1800  2.6   2100  2.5       Recommend checking blood sugars before meals and at bedtime.    If Blood glucose are low more often-> 2-3 times/week- give us a call.  Make better food choices: reduce carbs, Reduce portion size, weight loss and exercise 3-4 times a week.      2. Hypertension - Takes lisinopril 10 mg    3. Hyperlipidemia - Not on medication.  High TG.  Discussed diagnosis,  pathophysiology, management and treatment options of condition with pt.  Recommend strict BG control.  FLP in 3 months.   4. Prevention  Opthalmology: Due for eye examination  ASA-no 2/2 to age  Smoking- no    Most Recent Immunizations   Administered Date(s) Administered     COVID-19,PF,Pfizer (12+ Yrs) 04/20/2021     Flu, Unspecified 10/12/2019     HEPA 11/01/2008     HepA-Adult 06/03/2019     HepB 01/09/2004     Hib (PRP-T) 05/27/1992     Historical DTP/aP 01/01/1996     Influenza (H1N1) 12/08/2009     Influenza (IIV3) PF 10/17/2012     Influenza Vaccine IM > 6 months Valent IIV4 (Alfuria,Fluzone) 01/07/2019     Influenza,INJ,MDCK,PF,Quad >4yrs 09/15/2020     MMR 11/14/2003     OPV, trivalent, live 12/13/1995     Pneumococcal 23 valent 11/18/2017     TD (ADULT, 7+) 11/14/2003     Tdap (Adacel,Boostrix) 11/11/2014     Recommend checking blood sugars before meals and at bedtime.    If Blood glucose are low more often-> 2-3 times/week- give us a call.  The patient is advised to Make better food choices: reduce carbs, Reduce portion size, weight loss and exercise 3-4 times a week.  Discussed hypoglycemia signs and symptoms as well as management in detail.      Follow-up:  Follow up in 3 months    Bridgette Katz MD  Endocrinology   [facility]  CC: Jacey Mon    All questions were answered.  The patient indicates understanding of the above issues and agrees with the plan set forth.     Disclaimer: This note consists of symbols derived from keyboarding, dictation and/or voice recognition software. As a result, there may be errors in the script that have gone undetected. Please consider this when interpreting information found in this chart.                      Again, thank you for allowing me to participate in the care of your patient.        Sincerely,        Bridgette Katz MD

## 2022-04-26 NOTE — RESULT ENCOUNTER NOTE
Labs results/imaging studies results noted. Will discuss in next clinic visit 4/26/22.  Lab Results       Component                Value               Date                       A1C                      7.2                 04/25/2022                 A1C                      7.1                 12/01/2021              Here is a copy for your records.  Follow-up in endocrinology Clinic as scheduled/discussed. We will review these studies/results in further detail at that visit, but feel free to contact us with any other questions in the interim.    Please call endocrinology clinic (nurse line: [phone]) if questions.    Bridgette Katz MD    
Improved.

## 2022-04-26 NOTE — NURSING NOTE
Chief Complaint   Patient presents with     Follow Up     Enrique, is here for a follow up appt    [place] [place]

## 2022-05-05 ENCOUNTER — OFFICE VISIT (OUTPATIENT)
Dept: FAMILY MEDICINE | Facility: CLINIC | Age: 32
End: 2022-05-05

## 2022-05-05 VITALS
HEIGHT: 69 IN | BODY MASS INDEX: 42.8 KG/M2 | DIASTOLIC BLOOD PRESSURE: 78 MMHG | OXYGEN SATURATION: 98 % | TEMPERATURE: 97.3 F | WEIGHT: 289 LBS | SYSTOLIC BLOOD PRESSURE: 122 MMHG | HEART RATE: 90 BPM

## 2022-05-05 DIAGNOSIS — E10.42 TYPE 1 DIABETES MELLITUS WITH DIABETIC POLYNEUROPATHY (H): ICD-10-CM

## 2022-05-05 DIAGNOSIS — S92.512A CLOSED DISPLACED FRACTURE OF PROXIMAL PHALANX OF LESSER TOE OF LEFT FOOT, INITIAL ENCOUNTER: Primary | ICD-10-CM

## 2022-05-05 PROCEDURE — 99213 OFFICE O/P EST LOW 20 MIN: CPT | Performed by: PHYSICIAN ASSISTANT

## 2022-05-05 PROCEDURE — 73660 X-RAY EXAM OF TOE(S): CPT | Mod: LT | Performed by: PHYSICIAN ASSISTANT

## 2022-05-05 PROCEDURE — L4387 NON-PNEUM WALK BOOT PRE OTS: HCPCS | Mod: LT | Performed by: PHYSICIAN ASSISTANT

## 2022-05-05 NOTE — NURSING NOTE
Chief Complaint   Patient presents with     Foot Problems     Left foot is red and swollen for a couple of weeks. Pt started a job that stands a lot. Bought a new pair of shoes recently but did not help.       Pre-visit Screening:  Immunizations:  up to date  Colonoscopy:  NA  Mammogram: NA  Asthma Action Test/Plan:  NA  PHQ9:  NA  GAD7:  NA  Questioned patient about current smoking habits Pt. has never smoked.  Ok to leave detailed message on voice mail for today's visit only Yes, phone # [phone]

## 2022-05-05 NOTE — PROGRESS NOTES
"CC: Toe change    History:  Toe pain:   Enirque is here with changes in left foot 5th toe. Started 10 days ago. Stable since that time. Not necessary painful, but it is swollen and red. Since that time, has been stable. Did switch jobs 2 months back to job where he is on feet a lot more. Got wider fitting shoes with toe box, and good structural support that are specific for patients with diabetes. No specific injury, trauma, fall, but does do lifting/pushing off his toes a lot at work.    DM1:  Pt has type 1 diabetes. Does have chronic neuropathy in both feet towards toes, where sensation is decreased, but not absent. Went to podiatrist 6 months ago that confirmed decreased sensation, recommended diabetes shoes, and mentioned difficult to feel pulse. I do not have access to this record.    PMH, MEDICATIONS, ALLERGIES, SOCIAL AND FAMILY HISTORY in [facility] and reviewed by me personally.    ROS negative other than the symptoms noted above in the HPI.    Examination   /78 (BP Location: Right arm, Patient Position: Sitting, Cuff Size: Adult Large)   Pulse 90   Temp 97.3  F (36.3  C) (Temporal)   Ht 1.746 m (5' 8.75\")   Wt 131.1 kg (289 lb)   SpO2 98%   BMI 42.99 kg/m       Constitutional: Sitting comfortably, in no acute distress. Vital signs noted  Neck:  no adenopathy, trachea midline and normal to palpation, thyroid normal to palpation  Cardiovascular:  regular rate and rhythm, no murmurs, clicks, or gallops  Respiratory:  normal respiratory rate and rhythm, lungs clear to auscultation  M/S: ROM of left 5th digit limited to 50% of normal flexion. Moderate edema and erythema. No tenderness to palpation. NEURO:  Monofilament test shows decrease sensation, but able feel faint prick in 5th digit.   SKIN: No jaundice/pallor/rash.   Psychiatric: mentation appears normal and affect normal/bright      A/P    ICD-10-CM    1. Closed displaced fracture of proximal phalanx of lesser toe of left foot, initial encounter  " S92.512A X-ray lt toe(s) G/E 2 views     [employer]le, Medium [id number]   2. Type 1 diabetes mellitus with diabetic polyneuropathy (H)  E10.42 X-ray lt toe(s) G/E 2 views       DISCUSSION:  Difficult to access in the setting of diabetes as pain component could be decreased/absent due to neuropathy. Completed x-ray of toe that showed fracture of 5th MTP. Non-displaced. Consulted with Dr. Villar, who advised boot/surgical shoe for 4 weeks and recheck. Fitted pt with boot (5/6/2022) once radiology report had confirmed. He agrees to wear this, and also plans to cut down on work activity. He will return in 4-6 weeks to repeat x-ray, or contact in 3-4 weeks if not seeing notable improvement.     follow up visit: As needed    Verito Molina PA-C  [facility]

## 2022-05-31 ENCOUNTER — MYC MEDICAL ADVICE (OUTPATIENT)
Dept: FAMILY MEDICINE | Facility: CLINIC | Age: 32
End: 2022-05-31

## 2022-05-31 DIAGNOSIS — S92.515G CLOSED NONDISPLACED FRACTURE OF PROXIMAL PHALANX OF LESSER TOE OF LEFT FOOT WITH DELAYED HEALING, SUBSEQUENT ENCOUNTER: Primary | ICD-10-CM

## 2022-06-20 ENCOUNTER — TRANSFERRED RECORDS (OUTPATIENT)
Dept: FAMILY MEDICINE | Facility: CLINIC | Age: 32
End: 2022-06-20

## 2022-07-12 ENCOUNTER — TRANSFERRED RECORDS (OUTPATIENT)
Dept: FAMILY MEDICINE | Facility: CLINIC | Age: 32
End: 2022-07-12

## 2022-07-17 ENCOUNTER — MYC REFILL (OUTPATIENT)
Dept: ENDOCRINOLOGY | Facility: CLINIC | Age: 32
End: 2022-07-17

## 2022-07-17 DIAGNOSIS — E10.42 TYPE 1 DIABETES MELLITUS WITH DIABETIC POLYNEUROPATHY (H): ICD-10-CM

## 2022-07-18 ENCOUNTER — TELEPHONE (OUTPATIENT)
Dept: ENDOCRINOLOGY | Facility: CLINIC | Age: 32
End: 2022-07-18

## 2022-07-18 RX ORDER — INSULIN LISPRO 100 [IU]/ML
INJECTION, SOLUTION INTRAVENOUS; SUBCUTANEOUS
Qty: 120 ML | Refills: 0 | Status: SHIPPED | OUTPATIENT
Start: 2022-07-18 | End: 2022-09-26

## 2022-07-18 NOTE — TELEPHONE ENCOUNTER
Fax received from [employer] - Insulin Pump for review and signature.  Put in Dr. Katz's in basket.

## 2022-07-18 NOTE — TELEPHONE ENCOUNTER
[facility] LMN for Insulin Pump and Supplies    LAST OFFICE/VIRTUAL VISIT:  04/26/22    FUTURE OFFICE/VIRTUAL VISIT:  08/30/22    Lab Results   Component Value Date    A1C 7.2 04/25/2022    A1C 7.1 12/01/2021    A1C 7.2 12/15/2020    A1C 7.5 07/23/2020    A1C 7.0 06/17/2020    A1C 7.0 02/03/2020         Estrella Laura CMA  [facility]  Karena/Lila

## 2022-07-20 ENCOUNTER — MEDICAL CORRESPONDENCE (OUTPATIENT)
Dept: HEALTH INFORMATION MANAGEMENT | Facility: CLINIC | Age: 32
End: 2022-07-20

## 2022-07-20 NOTE — TELEPHONE ENCOUNTER
Form was faxed and sent to scanning.      Estrella Laura, [facility]  [place]a/Lila

## 2022-07-25 ENCOUNTER — TELEPHONE (OUTPATIENT)
Dept: FAMILY MEDICINE | Facility: CLINIC | Age: 32
End: 2022-07-25

## 2022-07-25 ENCOUNTER — TELEPHONE (OUTPATIENT)
Dept: ENDOCRINOLOGY | Facility: CLINIC | Age: 32
End: 2022-07-25

## 2022-07-25 NOTE — TELEPHONE ENCOUNTER
Fax received from [employer] - Physician Orders  for review and signature.  Put in Dr. Katz's in basket.

## 2022-07-25 NOTE — TELEPHONE ENCOUNTER
"Gonzalez Gibbs \"Enrique\"  Patient Customer Service Request Pool 41 minutes ago (12:00 PM)     CS      Thank you so much! I believe the company has sent a prescription form through fax for Dr. Katz to sign to be able to send me my pump supplies. Thank you so much for your quick response! Best care team Geraldine!          Deandra Aguilar CMA Sandberg, Christopher C \"Enrique\" 19 hours ago (5:09 PM)     CATHERINE Nunez,   Our fax number is [phone]. Thank you and have a great rest of your day!  Gonzalez Talamantes CMA \"Enrique\"  Patient Customer Service Request Pool 2 days ago     CS      Topic: Non-Medical Question.     Good afternoon,      Can you provide me with her fax number? I need it to be able to order some pump supplies and the form they are making me fill out requires it. I would greatly appreciate it    Received forms for Dr Katz, faxed to [facility]  "

## 2022-07-26 NOTE — TELEPHONE ENCOUNTER
[employer] form for Tandem [employer] 90 infusion set 6mm x 23in tlock Blue    LAST OFFICE/VIRTUAL VISIT:  04/26/22    FUTURE OFFICE/VIRTUAL VISIT:  08/30/22    Lab Results   Component Value Date    A1C 7.2 04/25/2022    A1C 7.1 12/01/2021    A1C 7.2 12/15/2020    A1C 7.5 07/23/2020    A1C 7.0 06/17/2020    A1C 7.0 02/03/2020         Estrella Laura CMA  [facility]  [place]arena/Lila

## 2022-07-27 ENCOUNTER — MYC MEDICAL ADVICE (OUTPATIENT)
Dept: ENDOCRINOLOGY | Facility: CLINIC | Age: 32
End: 2022-07-27

## 2022-07-27 ENCOUNTER — MEDICAL CORRESPONDENCE (OUTPATIENT)
Dept: HEALTH INFORMATION MANAGEMENT | Facility: CLINIC | Age: 32
End: 2022-07-27

## 2022-07-28 NOTE — TELEPHONE ENCOUNTER
Form was faxed and sent to scanning.      Estrella Laura, [facility]  [place]a/Lila

## 2022-08-04 ENCOUNTER — OFFICE VISIT (OUTPATIENT)
Dept: FAMILY MEDICINE | Facility: CLINIC | Age: 32
End: 2022-08-04

## 2022-08-04 VITALS
SYSTOLIC BLOOD PRESSURE: 128 MMHG | TEMPERATURE: 97.2 F | DIASTOLIC BLOOD PRESSURE: 84 MMHG | RESPIRATION RATE: 20 BRPM | BODY MASS INDEX: 43.93 KG/M2 | WEIGHT: 296.6 LBS | HEART RATE: 80 BPM | HEIGHT: 69 IN

## 2022-08-04 DIAGNOSIS — S46.912A STRAIN OF LEFT SHOULDER, INITIAL ENCOUNTER: Primary | ICD-10-CM

## 2022-08-04 PROCEDURE — 99213 OFFICE O/P EST LOW 20 MIN: CPT | Performed by: PHYSICIAN ASSISTANT

## 2022-08-04 NOTE — NURSING NOTE
Gonzalez Gibbs is here for left side chest and back pain for the past few days. Hurts when he moves his shoulder, seems to be getting worse.     Questioned patient about current smoking habits.  Pt. has never smoked.  PULSE regular  My Chart: active  CLASSIFICATION OF OVERWEIGHT AND OBESITY BY BMI                        Obesity Class           BMI(kg/m2)  Underweight                                    < 18.5  Normal                                         18.5-24.9  Overweight                                     25.0-29.9  OBESITY                     I                  30.0-34.9                             II                 35.0-39.9  EXTREME OBESITY             III                >40                            Patient's  BMI Body mass index is 44.12 kg/m .  http://hin.nhlbi.nih.gov/menuplanner/menu.cgi  Pre-visit planning  Immunizations - up to date  Colonoscopy -   Mammogram -   Asthma -   PHQ9 -    ANTONETTE-7 -

## 2022-08-04 NOTE — LETTER
[facility]  [address], [address]  [place]  [address]    August 4, 2022        Gonzalez Gibbs  [address]  [facility] [address]              To Whom It May Concern,    Gonzalez Gibbs is under my care for an acute injury. As part of his recovery, he needs a modified work pace. Please allow him to work at a slower pace and allow him to ask for help to lift or push heavy objects through 8/14/22. No restrictions after 8/14/22.         If you have any further questions or problems, please contact our office at [phone].          Juan Jose Martinez PA-C

## 2022-08-04 NOTE — PROGRESS NOTES
"  Assessment & Plan     Strain of left shoulder, initial encounter-patient is already completing RICE with NSAIDs, work is likely cause of his issue given worse with pushing that he frequently does at job. Relatively normal exam, will treat as muscle strain and await improvement  Continue RICE and NSAIDs-cautioned to limit NSAID usage  Wrote letter with work restrictions  Call if new or worsening symptoms          Follow up if not improving in next 3-4 weeks-consider PT referral at that time    No follow-ups on file.    Juan Jose Martinez, JULIÁN  [facility]    Subjective   Enrique is a 32 year old, presenting for the following health issues:  Shoulder left (Back and chest)      HPI     Patient began to have left shoulder/chest pain Sunday. Started mild, then worsened over the last week. For work, patient pushes a lot of heavy objects and lifts things above his head with these muscle groups. Deep breaths in make the chest pain worse. Currently, the shoulder hurts the most-sneezing also makes the pain worse. Pain is described as aching. Very mild weakness in arm, no numbness/tingling. No known trauma or falls. Patient has done RICE along with Aleve/Advil. Patient feels the issue is worsening. Patient is still working during this injury.      Review of Systems   Constitutional, HEENT, cardiovascular, pulmonary, gi and gu systems are negative, except as otherwise noted.      Objective    /84 (BP Location: Right arm, Patient Position: Chair, Cuff Size: Adult Large)   Pulse 80   Temp 97.2  F (36.2  C) (Temporal)   Resp 20   Ht 1.746 m (5' 8.75\")   Wt 134.5 kg (296 lb 9.6 oz)   BMI 44.12 kg/m    Body mass index is 44.12 kg/m .  Physical Exam   GENERAL: healthy, alert and no distress  NECK: no adenopathy, no asymmetry, masses, or scars and thyroid normal to palpation  RESP: lungs clear to auscultation - no rales, rhonchi or wheezes  CV: regular rate and rhythm, normal S1 S2, no S3 or S4, no murmur, click " or rub, no peripheral edema and peripheral pulses strong  ABDOMEN: soft, nontender, no hepatosplenomegaly, no masses and bowel sounds normal  MS: no gross musculoskeletal defects noted, no edema. L shoulder: normal strength/tone, no pain on opposed external/internal rotation, negative painful arc/drop test. ROM normal. Pain with palpation of shoulder, mild.  SKIN: no suspicious lesions or rashes  NEURO: Normal strength and tone, mentation intact and speech normal                    .  ..

## 2022-09-25 ENCOUNTER — TELEPHONE (OUTPATIENT)
Dept: ENDOCRINOLOGY | Facility: CLINIC | Age: 32
End: 2022-09-25

## 2022-09-25 DIAGNOSIS — E10.42 TYPE 1 DIABETES MELLITUS WITH DIABETIC POLYNEUROPATHY (H): ICD-10-CM

## 2022-09-26 RX ORDER — INSULIN LISPRO 100 [IU]/ML
INJECTION, SOLUTION INTRAVENOUS; SUBCUTANEOUS
Qty: 120 ML | Refills: 0 | Status: SHIPPED | OUTPATIENT
Start: 2022-09-26 | End: 2022-09-26

## 2022-09-28 DIAGNOSIS — E10.42 TYPE 1 DIABETES MELLITUS WITH DIABETIC POLYNEUROPATHY (H): ICD-10-CM

## 2022-09-28 RX ORDER — PROCHLORPERAZINE 25 MG/1
SUPPOSITORY RECTAL
Qty: 1 EACH | Refills: 0 | Status: SHIPPED | OUTPATIENT
Start: 2022-09-28 | End: 2023-01-31

## 2022-10-05 DIAGNOSIS — E10.42 TYPE 1 DIABETES MELLITUS WITH DIABETIC POLYNEUROPATHY (H): ICD-10-CM

## 2022-10-05 RX ORDER — PROCHLORPERAZINE 25 MG/1
SUPPOSITORY RECTAL
Qty: 9 EACH | Refills: 0 | Status: SHIPPED | OUTPATIENT
Start: 2022-10-05 | End: 2023-01-09

## 2022-10-09 ENCOUNTER — HEALTH MAINTENANCE LETTER (OUTPATIENT)
Age: 32
End: 2022-10-09

## 2022-11-05 DIAGNOSIS — E10.9 TYPE 1 DIABETES MELLITUS WITHOUT COMPLICATION (H): ICD-10-CM

## 2022-11-17 NOTE — TELEPHONE ENCOUNTER
[address] [place] [address]     I have reviewed the chart and currently the patient is resting comfortably with a sitter at the bedside and I did not disturb the patient. Psychiatry consult note is reviewed, and medications have been adjusted. The hospice nurse liaison met with family on 11/16/22 and hospice information is provided. The family is interested in possible placement, rehab and management of the behaviors. Hospice will follow peripherally.     Carlos Briseno MD Pending Prescriptions:                       Disp   Refills    insulin aspart (NOVOLOG VIAL) 100 UNITS/ML*40 mL  1        Sig: ADMINISTER UP  UNITS VIA INSULIN PUMP EVERY DAY           AS DIRECTED    Routing refill request to provider for review/approval because:  Patient fails protocol

## 2022-11-26 ENCOUNTER — HEALTH MAINTENANCE LETTER (OUTPATIENT)
Age: 32
End: 2022-11-26

## 2022-12-06 DIAGNOSIS — E10.42 TYPE 1 DIABETES MELLITUS WITH DIABETIC POLYNEUROPATHY (H): ICD-10-CM

## 2022-12-07 ENCOUNTER — MYC MEDICAL ADVICE (OUTPATIENT)
Dept: FAMILY MEDICINE | Facility: CLINIC | Age: 32
End: 2022-12-07

## 2022-12-07 DIAGNOSIS — I10 BENIGN ESSENTIAL HYPERTENSION: ICD-10-CM

## 2022-12-07 DIAGNOSIS — E10.9 TYPE 1 DIABETES MELLITUS WITHOUT COMPLICATION (H): ICD-10-CM

## 2022-12-07 DIAGNOSIS — F41.1 GENERALIZED ANXIETY DISORDER WITH PANIC ATTACKS: ICD-10-CM

## 2022-12-07 DIAGNOSIS — F41.0 GENERALIZED ANXIETY DISORDER WITH PANIC ATTACKS: ICD-10-CM

## 2022-12-07 RX ORDER — IBUPROFEN 200 MG
TABLET ORAL
Qty: 400 EACH | Refills: 0 | Status: SHIPPED | OUTPATIENT
Start: 2022-12-07 | End: 2023-12-08

## 2022-12-07 NOTE — TELEPHONE ENCOUNTER
Gonzalez Gibbs is requesting a refill of:    Pending Prescriptions:                       Disp   Refills    lisinopril-hydrochlorothiazide (ZESTORETI*7 tabl*0            Sig: Take 1 tablet by mouth daily    escitalopram (LEXAPRO) 10 MG tablet       10 tab*0            Sig: Take 1.5 tablets (15 mg) by mouth daily    Pt has OV 12/14/22

## 2022-12-08 ENCOUNTER — MYC REFILL (OUTPATIENT)
Dept: ENDOCRINOLOGY | Facility: CLINIC | Age: 32
End: 2022-12-08

## 2022-12-08 DIAGNOSIS — E10.42 TYPE 1 DIABETES MELLITUS WITH DIABETIC POLYNEUROPATHY (H): ICD-10-CM

## 2022-12-08 RX ORDER — ESCITALOPRAM OXALATE 10 MG/1
15 TABLET ORAL DAILY
Qty: 10 TABLET | Refills: 0 | Status: SHIPPED | OUTPATIENT
Start: 2022-12-08 | End: 2022-12-14

## 2022-12-08 RX ORDER — INSULIN LISPRO 100 [IU]/ML
INJECTION, SOLUTION INTRAVENOUS; SUBCUTANEOUS
Qty: 120 ML | Refills: 0 | Status: SHIPPED | OUTPATIENT
Start: 2022-12-08 | End: 2022-12-12

## 2022-12-08 RX ORDER — LISINOPRIL AND HYDROCHLOROTHIAZIDE 12.5; 2 MG/1; MG/1
1 TABLET ORAL DAILY
Qty: 7 TABLET | Refills: 0 | Status: SHIPPED | OUTPATIENT
Start: 2022-12-08 | End: 2022-12-14

## 2022-12-14 ENCOUNTER — OFFICE VISIT (OUTPATIENT)
Dept: FAMILY MEDICINE | Facility: CLINIC | Age: 32
End: 2022-12-14

## 2022-12-14 VITALS
WEIGHT: 299 LBS | SYSTOLIC BLOOD PRESSURE: 130 MMHG | TEMPERATURE: 97.3 F | OXYGEN SATURATION: 96 % | DIASTOLIC BLOOD PRESSURE: 84 MMHG | HEART RATE: 76 BPM | BODY MASS INDEX: 44.28 KG/M2 | HEIGHT: 69 IN

## 2022-12-14 DIAGNOSIS — I10 BENIGN ESSENTIAL HYPERTENSION: ICD-10-CM

## 2022-12-14 DIAGNOSIS — F41.0 GENERALIZED ANXIETY DISORDER WITH PANIC ATTACKS: ICD-10-CM

## 2022-12-14 DIAGNOSIS — F41.1 GENERALIZED ANXIETY DISORDER WITH PANIC ATTACKS: ICD-10-CM

## 2022-12-14 DIAGNOSIS — E10.9 TYPE 1 DIABETES MELLITUS WITHOUT COMPLICATION (H): ICD-10-CM

## 2022-12-14 LAB — HBA1C MFR BLD: 6.8 % (ref 4–7)

## 2022-12-14 PROCEDURE — 99213 OFFICE O/P EST LOW 20 MIN: CPT | Performed by: PHYSICIAN ASSISTANT

## 2022-12-14 PROCEDURE — 83036 HEMOGLOBIN GLYCOSYLATED A1C: CPT | Performed by: PHYSICIAN ASSISTANT

## 2022-12-14 PROCEDURE — 80061 LIPID PANEL: CPT | Performed by: PHYSICIAN ASSISTANT

## 2022-12-14 PROCEDURE — 36415 COLL VENOUS BLD VENIPUNCTURE: CPT | Performed by: PHYSICIAN ASSISTANT

## 2022-12-14 PROCEDURE — 80053 COMPREHEN METABOLIC PANEL: CPT | Performed by: PHYSICIAN ASSISTANT

## 2022-12-14 RX ORDER — LISINOPRIL AND HYDROCHLOROTHIAZIDE 12.5; 2 MG/1; MG/1
1 TABLET ORAL DAILY
Qty: 90 TABLET | Refills: 1 | Status: SHIPPED | OUTPATIENT
Start: 2022-12-14 | End: 2023-06-14

## 2022-12-14 RX ORDER — ESCITALOPRAM OXALATE 10 MG/1
15 TABLET ORAL DAILY
Qty: 135 TABLET | Refills: 3 | Status: SHIPPED | OUTPATIENT
Start: 2022-12-14 | End: 2023-09-15

## 2022-12-14 ASSESSMENT — ANXIETY QUESTIONNAIRES
1. FEELING NERVOUS, ANXIOUS, OR ON EDGE: SEVERAL DAYS
6. BECOMING EASILY ANNOYED OR IRRITABLE: SEVERAL DAYS
GAD7 TOTAL SCORE: 3
5. BEING SO RESTLESS THAT IT IS HARD TO SIT STILL: NOT AT ALL
3. WORRYING TOO MUCH ABOUT DIFFERENT THINGS: SEVERAL DAYS
IF YOU CHECKED OFF ANY PROBLEMS ON THIS QUESTIONNAIRE, HOW DIFFICULT HAVE THESE PROBLEMS MADE IT FOR YOU TO DO YOUR WORK, TAKE CARE OF THINGS AT HOME, OR GET ALONG WITH OTHER PEOPLE: NOT DIFFICULT AT ALL
2. NOT BEING ABLE TO STOP OR CONTROL WORRYING: NOT AT ALL
GAD7 TOTAL SCORE: 3
7. FEELING AFRAID AS IF SOMETHING AWFUL MIGHT HAPPEN: NOT AT ALL

## 2022-12-14 ASSESSMENT — PATIENT HEALTH QUESTIONNAIRE - PHQ9
5. POOR APPETITE OR OVEREATING: NOT AT ALL
SUM OF ALL RESPONSES TO PHQ QUESTIONS 1-9: 3

## 2022-12-14 NOTE — PROGRESS NOTES
"CC: Medication Check    History:  HTN:  Pt is treated for HTN. Taking lisinopril-hctz. Has been taking consistently. Denies any side effects. Does check BP at home on rare occasion, and gets 120s/80. Last eye exam was 2 years ago, but on his list to schedule this year. Denies any chest pain, palpitations, SOB. Has been staying active, hikes every other day. Weight has increased 10 lbs since 5/2022. Admits he has been eating less healthy since randall 2-3 weeks ago, but plans to return soon.      Anxiety, Depression:  Takes escitalopram 15 mg. Symptoms seem to be controlled. Has not had a panic attack in at least 1 year.Had stopped therapy for a period due to insurance issues, but this has been resolved, and just recently restarted.  Denies any side effects. No SI/HI.    Obesity:  Has gained 10 lbs since 5/2022, but mostly just recently with randall.  Realistic goal is 200 lbs, but ultimate goal would be less than 200.     DM1:  Works with Dr. Katz, and has virtual visit scheduled for next month. Is due for A1c. Has gone to podiatry approximately 1 year ago, but hasn't had foot exam since then.     PMH, MEDICATIONS, ALLERGIES, SOCIAL AND FAMILY HISTORY in [facility] and reviewed by me personally.    ROS negative other than the symptoms noted above in the HPI.      Examination   /84 (BP Location: Right arm, Patient Position: Sitting, Cuff Size: Adult Large)   Pulse 76   Temp 97.3  F (36.3  C) (Temporal)   Ht 1.746 m (5' 8.75\")   Wt 135.6 kg (299 lb)   SpO2 96%   BMI 44.48 kg/m       Constitutional: Sitting comfortably, in no acute distress. Vital signs noted  Neck:  no adenopathy, trachea midline and normal to palpation, thyroid normal to palpation  Cardiovascular:  regular rate and rhythm, no murmurs, clicks, or gallops  Respiratory:  normal respiratory rate and rhythm, lungs clear to auscultation  SKIN: No jaundice/pallor/rash.   Psychiatric: mentation appears normal and affect normal/bright  Diabetic " foot exam: No ulcer or callous formation. Monofilament test decreased in portions of each foot. Mild charcot deformity noted bilaterally.    A/P    ICD-10-CM    1. Generalized anxiety disorder with panic attacks  F41.1 escitalopram (LEXAPRO) 10 MG tablet    F41.0       2. Benign essential hypertension  I10 lisinopril-hydrochlorothiazide (ZESTORETIC) 20-12.5 MG tablet     Comprehensive Metobolic Panel (BFP)      3. Type 1 diabetes mellitus without complication (H)  E10.9 HEMOGLOBIN A1C (BFP)     VENOUS COLLECTION     FOOT EXAM  NO CHARGE [38505.114]     lisinopril-hydrochlorothiazide (ZESTORETIC) 20-12.5 MG tablet     Lipid Panel (BFP)     Comprehensive Metobolic Panel (BFP)          DISCUSSION:  HTN:  BP today nearly meeting goal of <130/80. Agreed to continue to monitor as pt works on lifestyle weight loss. Will update labs. Refilled for 6 months.     Anxiety, Depression:  ANTONETTE/PHQ updated today, and both are well controlled. Agreed to refill without change for 1 year. Encouraged him to continue therapy. Contact us sooner if concerns, worsening. ER with significant worsening, safety concerns.     Obesity:  Encouraged Enrique to continue working on weight loss. Would have many benefits for him to get down to goal of 200 lbs.     DM1:  Foot exam completed today with mild loss of sensation as well as mild charcot deformity, recommended return to podiatry. A1c today mildly improved to 6.8%. Will update fasting labs for his endocrinologist to reference as well.      follow up visit: 6 months    Verito Molina PA-C  [facility]

## 2022-12-14 NOTE — NURSING NOTE
Chief Complaint   Patient presents with     Recheck Medication     Fasting med check  Pt does see endo for diabetes       Pre-visit Screening:  Immunizations:  up to date  Colonoscopy:  NA  Mammogram: NA  Asthma Action Test/Plan:  NA  PHQ9:  NA  GAD7:  NA  Questioned patient about current smoking habits Pt. has never smoked.  Ok to leave detailed message on voice mail for today's visit only Yes, phone # [phone]

## 2022-12-15 LAB
ALBUMIN SERPL-MCNC: 4.6 G/DL (ref 3.6–5.1)
ALBUMIN/GLOB SERPL: 1.7 {RATIO} (ref 1–2.5)
ALP SERPL-CCNC: 38 U/L (ref 33–130)
ALT 1742-6: 40 U/L (ref 0–32)
AST 1920-8: 25 U/L (ref 0–35)
BILIRUB SERPL-MCNC: 0.6 MG/DL (ref 0.2–1.2)
BUN SERPL-MCNC: 10 MG/DL (ref 7–25)
BUN/CREATININE RATIO: 13.5 (ref 6–22)
CALCIUM SERPL-MCNC: 9.8 MG/DL (ref 8.6–10.3)
CHLORIDE SERPLBLD-SCNC: 101.5 MMOL/L (ref 98–110)
CHOLEST SERPL-MCNC: 232 MG/DL (ref 0–199)
CHOLEST/HDLC SERPL: 4 {RATIO} (ref 0–5)
CO2 SERPL-SCNC: 27.4 MMOL/L (ref 20–32)
CREAT SERPL-MCNC: 0.74 MG/DL (ref 0.6–1.3)
GLOBULIN, CALCULATED - QUEST: 2.7 (ref 1.9–3.7)
GLUCOSE SERPL-MCNC: 151 MG/DL (ref 60–99)
HDLC SERPL-MCNC: 59 MG/DL (ref 40–150)
LDLC SERPL CALC-MCNC: 142 MG/DL (ref 0–130)
POTASSIUM SERPL-SCNC: 4.82 MMOL/L (ref 3.5–5.3)
PROT SERPL-MCNC: 7.3 G/DL (ref 6.1–8.1)
SODIUM SERPL-SCNC: 139.1 MMOL/L (ref 135–146)
TRIGL SERPL-MCNC: 157 MG/DL (ref 0–149)

## 2023-01-07 DIAGNOSIS — E10.42 TYPE 1 DIABETES MELLITUS WITH DIABETIC POLYNEUROPATHY (H): ICD-10-CM

## 2023-01-09 ENCOUNTER — MYC MEDICAL ADVICE (OUTPATIENT)
Dept: ENDOCRINOLOGY | Facility: CLINIC | Age: 33
End: 2023-01-09

## 2023-01-09 ENCOUNTER — MYC REFILL (OUTPATIENT)
Dept: ENDOCRINOLOGY | Facility: CLINIC | Age: 33
End: 2023-01-09

## 2023-01-09 DIAGNOSIS — E10.42 TYPE 1 DIABETES MELLITUS WITH DIABETIC POLYNEUROPATHY (H): ICD-10-CM

## 2023-01-09 RX ORDER — PROCHLORPERAZINE 25 MG/1
SUPPOSITORY RECTAL
Qty: 3 EACH | Refills: 0 | Status: SHIPPED | OUTPATIENT
Start: 2023-01-09 | End: 2023-01-31

## 2023-01-09 RX ORDER — PROCHLORPERAZINE 25 MG/1
SUPPOSITORY RECTAL
Qty: 3 EACH | Refills: 0 | Status: SHIPPED | OUTPATIENT
Start: 2023-01-09 | End: 2023-01-09

## 2023-01-30 ENCOUNTER — TELEPHONE (OUTPATIENT)
Dept: ENDOCRINOLOGY | Facility: CLINIC | Age: 33
End: 2023-01-30
Payer: COMMERCIAL

## 2023-01-30 ASSESSMENT — ENCOUNTER SYMPTOMS
DEPRESSION: 1
WHEEZING: 0
HEMOPTYSIS: 0
DECREASED CONCENTRATION: 1
POSTURAL DYSPNEA: 0
DYSPNEA ON EXERTION: 0
SPUTUM PRODUCTION: 1
PANIC: 0
COUGH: 1
SHORTNESS OF BREATH: 0
COUGH DISTURBING SLEEP: 0
NERVOUS/ANXIOUS: 1
SNORES LOUDLY: 0
INSOMNIA: 0

## 2023-01-30 NOTE — TELEPHONE ENCOUNTER
Called patient and left voicemail. Patient has an appointment on 1/31/23. Need patient to upload their Tandem device to site for provider to review prior to their appointment.  Vidya Ventura MA

## 2023-01-31 ENCOUNTER — VIRTUAL VISIT (OUTPATIENT)
Dept: ENDOCRINOLOGY | Facility: CLINIC | Age: 33
End: 2023-01-31
Payer: COMMERCIAL

## 2023-01-31 DIAGNOSIS — E10.9 TYPE 1 DIABETES MELLITUS WITHOUT COMPLICATION (H): Primary | ICD-10-CM

## 2023-01-31 DIAGNOSIS — I10 BENIGN ESSENTIAL HYPERTENSION: ICD-10-CM

## 2023-01-31 DIAGNOSIS — Z96.41 INSULIN PUMP IN PLACE: ICD-10-CM

## 2023-01-31 PROCEDURE — 95251 CONT GLUC MNTR ANALYSIS I&R: CPT | Performed by: INTERNAL MEDICINE

## 2023-01-31 PROCEDURE — 99214 OFFICE O/P EST MOD 30 MIN: CPT | Mod: GT | Performed by: INTERNAL MEDICINE

## 2023-01-31 RX ORDER — PROCHLORPERAZINE 25 MG/1
SUPPOSITORY RECTAL
Qty: 9 EACH | Refills: 3 | Status: SHIPPED | OUTPATIENT
Start: 2023-01-31 | End: 2024-02-12

## 2023-01-31 RX ORDER — PROCHLORPERAZINE 25 MG/1
SUPPOSITORY RECTAL
Qty: 1 EACH | Refills: 3 | Status: SHIPPED | OUTPATIENT
Start: 2023-01-31 | End: 2024-04-01

## 2023-01-31 RX ORDER — INSULIN LISPRO 100 [IU]/ML
INJECTION, SOLUTION INTRAVENOUS; SUBCUTANEOUS
Qty: 120 ML | Refills: 1 | Status: SHIPPED | OUTPATIENT
Start: 2023-01-31 | End: 2023-09-11

## 2023-01-31 NOTE — PATIENT INSTRUCTIONS
[facility]  Dr Katz, Endocrinology Department    [facility]   [address] [id number]  [place] [address]  Appointment [phone]  Fax: [phone]  [place]: Monday - Thursday      To provide the best diabetic care, please bring your blood glucose meter to each and every visit with your Endocrinologist.  Your blood glucose meter/insulin pump will be downloaded at every appointment.    Please arrive 15 minutes before your scheduled appointment.  This will allow for your blood glucose meter/insulin pump to be downloaded.  If you are wearing DEXCOM please bring  or sharing code so that it can be downloaded.  If you are using freestyle steve personal sensors please bring the reader.  If you are using TANDEM insulin pump please have your username and password to get info from Tandem website.    Continue metformin at current dose.  Use control IQ as much as possible.  Change I:C ratio at 9.00 PM to 1:2.5 (from 1:3.0).  Continue rest pump settings at current levels.  Continue to use insulin pump and Dexcom  Labs (fasting) and follow-up in 3-4 months.    Recommend checking blood sugars before meals and at bedtime.    If Blood glucose are low more often-> 2-3 times/week- give us a call.  Make better food choices: reduce carbs, Reduce portion size, weight loss and exercise 3-4 times a week.    What is hypoglycemia:  Hypoglycemia is when blood sugar levels become too low - below 70 m/dl.      What causes hypoglycemia?  - using too much insulin  -taking too many diabetes pills  -not eating enough, or skipping meals or snacks  -not eating enough carbohydrate with meals  -changing your exercise routine  -drinking alcohol in excess    It is also possible to have hypoglycemia even when you are carefully managing your blood sugar levels.    What does it feel like when blood sugars get too low?  You may feel:  -  anxious  -confused  -dizzy  -hungry  -light-headed  -nervous  -shaky  -sleepy  -sweaty    You may have  -blurred or cloudy vision  -heart palpitations (heart skips a beat or races)  -tingling or numbness around the mouth and tongue  -tremors    What to do if you have symptoms of hypoglycmemia:  If you think your blood sugar is too low, check it with a glucose meter.  If its below 70 mg/dl, consume one of the following:  Fruit juice (1/2 cup)  Glucose tablets (15 grams)  Hard candy (5 to 7 pieces)  Honey or sugar (2 teaspoons)  Milk (1/2 cup)  Soft drink (non-diet, 1/2 cup)    Wait 15 minutes and check your blood glucose again.  IF it is still below 70 mg/dl, have another food item listed above. Wait another 15 minutes and repeat the blood glucose test.  Have a small meal or snack that contains some carbohydrate after your blood glucose rises above 70 mg/dl.    If you are at risk of hypoglycemia, always carry with you glucose tablets or one of the foods listed above.      To prevent Hypoglycemia:  Avoid situations that may cause hypoglycemia  Before making any change to your diet or exercise routine, discuss them with your healthcare provider  Keep a record of your blood glucose levels.  Include the time of day, diabetes medications, when you had your last meal or snack, and what you were doing at the time (e.g. Watching TV, gardening, jogging, etc).    Talk to your healthcare provider if your blood glucose levels are often low        Patient guide on hypoglycemia    [email or web address].org/Resources/[email or web address]-diagnosis-[email or web address]-management-[email or web address]

## 2023-01-31 NOTE — PROGRESS NOTES
"THIS IS A VIDEO VISIT:    Phone call visit/virtual visit encounter:    Name of patient: Gonzalez Gibbs    Date of encounter: 1/31/2023    Time of start of video visit: 3:03    Video started: 3:10    Video ended: 3:22    Provider location: working from home/ [facility]    Patient location: patients home.    Mode of transmission: [employer] video/ [employer]    Verbal consent: obtained before starting visit. Pt is agreeable.      The patient has been notified of following:      \"This VIDEO visit will be conducted via a call between you and your physician/provider. We have found that certain health care needs can be provided without the need for a physical exam.  This service lets us provide the care you need with a short phone conversation.  If a prescription is necessary we can send it directly to your pharmacy.  If lab work is needed we can place an order for that and you can then stop by our lab to have the test done at a later time.     With new updates with corona virus patient might be billed as clinic visit.     If during the course of the call the physician/provider feels a telephone visit is not appropriate, you will not be charged for this service.\"      Past medical history, social history, family history, allergy and medications were reviewed and updated as appropriate.  Reviewed pertinent labs, notes, imaging studies personally.    Endocrinology Clinic Note:  Name: Gonzlaez Gibbs  Seen for Diabetes f/u.   HPI:  Gonzalez Gibbs is a 32 year old male who presents for the evaluation/management of type 1 diabetes.  Was getting care at [facility] of [place] since last 2016 visit. Records requested.  Was On insulin pump for last 10-12 years.    Now using TANDEM T-slim insulin pump.   Using Novolog in pump.  Using  insulin pump with Dexcom.    CGM data reviewed.  Typically skips breakfast. Has lunch at 1:00 PM and dinner is at 5:30 PM- 7:00 PM. Snacks at 9:00 Pm lke fruits, " fruit snacks.  Dinner times are variable.    He was furlowed for few week and now back on work.  Weight stable.  He works as  at a company.  Walks about [id number] and 22883 steps/day at work.    Reports that he has long acting insulin in case of pump malfunction at home. Gonzalez also has glucagon kit at home for emergency.         1. Type 1 DM:  Orginally diagnosed at the age of: 13 years   Hospitalization for DKA: None  Current Regimen:Tandem  Insulin pump and metformin 1000 mg BID  BS checks: 3-4 times a day  Average Meter Download: Blood glucose data reviewed personally. See nursing note from this encounter for details.  Exercise: Few times a week  Last A1c: 7.2%  Episodes of hypoglycemia (low blood sugar): Occasional.  Gets symptoms  Fixed meal pattern: Yes  Patient counting carbs: Yes  Using PUMP: Yes.   Current Regimen:   (pump settings reviewed with pt on video)    Insulin pump -   Time Rate (U/hr)   0000-  2.6   0700  2.7   1800  2.6   2100  2.5     Carbohydrate Ratio -    Time Ratio   0000-  3   0700  1800  2100  1.5   1.8   3     Sensitivity   18   Active Insulin Time   hours   Basal    Bolus     Total Carbohydrates/day       Total Insulin/day       Average Blood Sugar                    DM Complications:   Nephropathy: No  Retinopathy: No   Neuropathy: No  Microalbuminuria: No  CAD/PAD: No  Gastroparesis: No  Hypoglycemia unawareness: No     2. Hypertension: Blood Pressure today:   BP Readings from Last 3 Encounters:   12/14/22 130/84   08/04/22 128/84   05/05/22 122/78      Blood pressure medications include lisinopril 10 mg.    3. Hyperlipidemia:  Not on medication.     PMH/PSH:  Past Medical History:   Diagnosis Date     Family history of diabetes mellitus      Mild intermittent asthma     EIA     Mild intermittent asthma     EIA     Type I diabetes mellitus, uncontrolled 6/13/2016     Past Surgical History:   Procedure Laterality Date     HC REPAIR ING HERNIA,6MO-5YR,REDUC  1992    also  umbilical hernia     ZZHC CREATE EARDRUM OPENING,GEN ANESTH      P.E. Tubes/ 4-5 surgeries, annual from age 10)     Family Hx:  Family History   Problem Relation Age of Onset     Hypertension Father      Depression Mother      Diabetes Paternal Uncle      Diabetes Other         2nd cousins     Cancer No family hx of      Heart Disease No family hx of            DM2:             Social Hx:  Social History     Socioeconomic History     Marital status: Single     Spouse name: Not on file     Number of children: Not on file     Years of education: Not on file     Highest education level: Not on file   Occupational History     Occupation: [employer]   Tobacco Use     Smoking status: Never     Smokeless tobacco: Never   Vaping Use     Vaping Use: Never used   Substance and Sexual Activity     Alcohol use: Yes     Alcohol/week: 0.0 standard drinks     Drug use: No     Sexual activity: Not Currently   Other Topics Concern      Service Not Asked     Blood Transfusions Not Asked     Caffeine Concern Not Asked     Occupational Exposure Not Asked     Hobby Hazards Not Asked     Sleep Concern Not Asked     Stress Concern Not Asked     Weight Concern Not Asked     Special Diet Not Asked     Back Care Not Asked     Exercise Yes     Bike Helmet Not Asked     Seat Belt Yes     Self-Exams Not Asked     Parent/sibling w/ CABG, MI or angioplasty before 65F 55M? Not Asked   Social History Narrative     Not on file     Social Determinants of Health     Financial Resource Strain: Not on file   Food Insecurity: Not on file   Transportation Needs: Not on file   Physical Activity: Not on file   Stress: Not on file   Social Connections: Not on file   Intimate Partner Violence: Not on file   Housing Stability: Not on file          MEDICATIONS:  has a current medication list which includes the following prescription(s): acetaminophen, blood glucose, blood glucose monitoring, dexcom g6 , dexcom g6 sensor, dexcom g6 transmitter,  escitalopram, glucagon emergency, insulin lispro, insulin syringe-needle u-100, lisinopril-hydrochlorothiazide, metformin, and order for dme.    ROS     ROS: 10 point ROS neg other than the symptoms noted above in the HPI.    Physical Exam   VS: There were no vitals taken for this visit.   GENERAL: healthy, alert and no distress  EYES: Eyes grossly normal to inspection, conjunctivae and sclerae normal  ENT: no nose swelling, nasal discharge.  Thyroid: no apparent thyroid nodules  RESP: no audible wheeze, cough, or visible cyanosis.  No visible retractions or increased work of breathing.  Able to speak fully in complete sentences.  ABDO: not evaluated.  EXTREMITIES: no hand tremors.  NEURO: Cranial nerves grossly intact, mentation intact and speech normal  SKIN: No apparent skin lesions, rash or edema seen   PSYCH: mentation appears normal, affect normal/bright, judgement and insight intact, normal speech and appearance well-groomed    LABS:  A1c:  Lab Results   Component Value Date    A1C 6.8 12/14/2022    A1C 7.2 04/25/2022    A1C 7.1 12/01/2021    A1C 7.2 12/15/2020    A1C 7.5 07/23/2020    A1C 7.0 06/17/2020       BMP:  Creatinine   Date Value Ref Range Status   12/14/2022 0.74 0.60 - 1.30 mg/dL Final       Urine Micro:  Lab Results   Component Value Date    UMALCR 14.52 04/25/2022    UMALCR <30 12/01/2021        LFTs/Lipids:  Recent Labs   Lab Test 12/14/22  0000 04/25/22  1008 12/15/20  0000   CHOL 232* 180 204*   HDL 59 44 51   * 86 116   TRIG 157* 248* 183*   CHOLHDLRATIO 4  --  4       TFTs:  TSH   Date Value Ref Range Status   04/25/2022 1.10 0.40 - 4.00 mU/L Final   06/15/2017 1.67 0.30 - 5.0 mcU/mL Final         Glucometer/ insulin pump (if applicable)/ CGM data (if applicable) downloaded, Personally reviewed and interpreted.  All Blood sugar data reviewed personally and discussed with pt.  See nursing note from today's clinic visit for details of BG/CGM log.  All pertinent notes, labs, and images  personally reviewed by me.     A/P  Mr.Christopher ANTONIO Gibbs is a 30 year old here for the evaluation/management of diabetes:    1. DM1 - Under fair control.  A1c 6.8%.  No complications associated with diabetes.  He is using insulin in tandem pump plus using Dexcom.  No major episodes of hypoglycemia noted/reported.   Plan:  Discussed diagnosis, pathophysiology, management and treatment options of condition with pt.  Continue metformin at current dose.  Use control IQ as much as possible.  Change I:C ratio at 9.00 PM to 1:2.5 (from 1:3.0).  Continue rest pump settings at current levels.  Continue to use insulin pump and Dexcom  Labs and follow-up in 3-4 months.    Recommend checking blood sugars before meals and at bedtime.    If Blood glucose are low more often-> 2-3 times/week- give us a call.  Make better food choices: reduce carbs, Reduce portion size, weight loss and exercise 3-4 times a week.      2. Hypertension - Takes lisinopril 10 mg    3. Hyperlipidemia - Not on medication.  High TG.  .  Discussed diagnosis, pathophysiology, management and treatment options of condition with pt.  Recommend strict BG control.  FLP in 3 months.   4. Prevention  Opthalmology: Due for eye examination  ASA-no 2/2 to age  Smoking- no      Most Recent Immunizations   Administered Date(s) Administered     COVID-19 Vaccine 12+ (Pfizer) 12/09/2021     COVID-19 Vaccine Bivalent Booster 12+ (Pfizer) 11/21/2022     Flu, Unspecified 10/12/2019     HEPA 11/01/2008     HepA-Adult 06/03/2019     HepB 01/09/2004     Hib (PRP-T) 05/27/1992     Historical DTP/aP 01/01/1996     Influenza (H1N1) 12/08/2009     Influenza (IIV3) PF 10/17/2012     Influenza Vaccine >6 months (Alfuria,Fluzone) 11/21/2022     Influenza,INJ,MDCK,PF,Quad >6mo(Flucelvax) 12/09/2021     MMR 11/14/2003     OPV, trivalent, live 12/13/1995     Pneumococcal 23 valent 11/18/2017     TD (ADULT, 7+) 11/14/2003     Tdap (Adacel,Boostrix) 11/11/2014     Recommend  checking blood sugars before meals and at bedtime.    If Blood glucose are low more often-> 2-3 times/week- give us a call.  The patient is advised to Make better food choices: reduce carbs, Reduce portion size, weight loss and exercise 3-4 times a week.  Discussed hypoglycemia signs and symptoms as well as management in detail.      Follow-up:  Follow up in 3 months    Bridgette Katz MD  Endocrinology   [facility]  CC: Jacey Mon    All questions were answered.  The patient indicates understanding of the above issues and agrees with the plan set forth.     Disclaimer: This note consists of symbols derived from keyboarding, dictation and/or voice recognition software. As a result, there may be errors in the script that have gone undetected. Please consider this when interpreting information found in this chart.                      Answers for HPI/ROS submitted by the patient on 1/30/2023  General Symptoms: No  Skin Symptoms: No  HENT Symptoms: No  EYE SYMPTOMS: No  HEART SYMPTOMS: No  LUNG SYMPTOMS: Yes  INTESTINAL SYMPTOMS: No  URINARY SYMPTOMS: No  REPRODUCTIVE SYMPTOMS: No  SKELETAL SYMPTOMS: No  BLOOD SYMPTOMS: No  NERVOUS SYSTEM SYMPTOMS: No  MENTAL HEALTH SYMPTOMS: Yes  Cough: Yes  Sputum or phlegm: Yes  Coughing up blood: No  Difficulty breating or shortness of breath: No  Snoring: No  Wheezing: No  Difficulty breathing on exertion: No  Nighttime Cough: No  Difficulty breathing when lying flat: No  Nervous or Anxious: Yes  Depression: Yes  Trouble sleeping: No  Trouble thinking or concentrating: Yes  Mood changes: Yes  Panic attacks: No

## 2023-01-31 NOTE — Clinical Note
"    1/31/2023         RE: Gonzalez Gibbs  [address] [facility] [address]        Dear Colleague,    Thank you for referring your patient, Gonzalez Gibbs, to the [facility]. Please see a copy of my visit note below.    Enrique is a 32 year old who is being evaluated via a billable video visit.      How would you like to obtain your AVS? ShopAdvisorhart  If the video visit is dropped, the invitation should be resent by: Text to cell phone: [phone]  Will anyone else be joining your video visit? No  {If patient encounters technical issues they should call [phone] :[id number]}      Video-Visit Details    Type of service:  Video Visit     Originating Location (pt. Location): {video visit patient location:350364::\"Home\"}  {PROVIDER LOCATION On-site should be selected for visits conducted from your clinic location or adjoining [facility] hospital, academic office, or other nearby [facility] building. Off-site should be selected for all other provider locations, including home:[id number]}  Distant Location (provider location):  {virtual location provider:496557}  Platform used for Video Visit: {Virtual Visit Platforms:703138::\"Tengion\"}Outcome for 01/24/23 12:20 PM: Data uploaded on Dexcom  Ann Brown LPN   Outcome for 01/30/23 11:28 AM: localbacon message sent- need tandem data.   Vidya Ventura MA  Outcome for 01/30/23 11:30 AM: Left Voicemail   Vidya Ventura MA  Outcome for 01/30/23 3:55 PM: Patient replied to [employer] message advising he will upload tandem after work.   Vidya Ventura MA  Outcome for 01/30/23 3:56 PM: Dexcom emailed to provider  Vidya Ventura MA  Outcome for 01/31/23 7:46 AM: Tandem emailed to provider  Vidya Ventura MA                    THIS IS A VIDEO VISIT:    Phone call visit/virtual visit encounter:    Name of patient: Gonzalez Gibbs    Date of encounter: 1/31/2023    Time of start of video visit: 3:03    Video started: 3:10    Video ended: " "3:22    Provider location: working from home/ [facility]    Patient location: patients home.    Mode of transmission: [employer] video/ [employer]    Verbal consent: obtained before starting visit. Pt is agreeable.      The patient has been notified of following:      \"This VIDEO visit will be conducted via a call between you and your physician/provider. We have found that certain health care needs can be provided without the need for a physical exam.  This service lets us provide the care you need with a short phone conversation.  If a prescription is necessary we can send it directly to your pharmacy.  If lab work is needed we can place an order for that and you can then stop by our lab to have the test done at a later time.     With new updates with corona virus patient might be billed as clinic visit.     If during the course of the call the physician/provider feels a telephone visit is not appropriate, you will not be charged for this service.\"      Past medical history, social history, family history, allergy and medications were reviewed and updated as appropriate.  Reviewed pertinent labs, notes, imaging studies personally.    Endocrinology Clinic Note:  Name: Gonzalez Gibbs  Seen for Diabetes f/u.   HPI:  Gonzalez Gibbs is a 32 year old male who presents for the evaluation/management of type 1 diabetes.  Was getting care at end[facility]inology clinic of [place] since last 2016 visit. Records requested.  Was On insulin pump for last 10-12 years.    Now using TANDEM T-slim insulin pump.   Using Novolog in pump.  Using  insulin pump with Dexcom.    CGM data reviewed.  Typically skips breakfast. Has lunch at 1:00 PM and dinner is at 5:30 PM- 7:00 PM. Snacks at 9:00 Pm lke fruits, fruit snacks.  Dinner times are variable.    He was furlowed for few week and now back on work.  Weight stable.  He works as  at a company.  Walks about [address] and [address]3861 steps/day at work.    Reports that he " has long acting insulin in case of pump malfunction at home. Gonzalez also has glucagon kit at home for emergency.         1. Type 1 DM:  Orginally diagnosed at the age of: 13 years   Hospitalization for DKA: None  Current Regimen:Tandem  Insulin pump and metformin 1000 mg BID  BS checks: 3-4 times a day  Average Meter Download: Blood glucose data reviewed personally. See nursing note from this encounter for details.  Exercise: Few times a week  Last A1c: 7.2%  Episodes of hypoglycemia (low blood sugar): Occasional.  Gets symptoms  Fixed meal pattern: Yes  Patient counting carbs: Yes  Using PUMP: Yes.   Current Regimen:   (pump settings reviewed with pt on video)    Insulin pump -   Time Rate (U/hr)   0000-  2.6   0700  2.7   1800  2.6   2100  2.5     Carbohydrate Ratio -    Time Ratio   0000-  3   0700  1800  2100  1.5   1.8   3     Sensitivity   18   Active Insulin Time   hours   Basal    Bolus     Total Carbohydrates/day       Total Insulin/day       Average Blood Sugar                    DM Complications:   Nephropathy: No  Retinopathy: No   Neuropathy: No  Microalbuminuria: No  CAD/PAD: No  Gastroparesis: No  Hypoglycemia unawareness: No     2. Hypertension: Blood Pressure today:   BP Readings from Last 3 Encounters:   12/14/22 130/84   08/04/22 128/84   05/05/22 122/78      Blood pressure medications include lisinopril 10 mg.    3. Hyperlipidemia:  Not on medication.     PMH/PSH:  Past Medical History:   Diagnosis Date     Family history of diabetes mellitus      Mild intermittent asthma     EIA     Mild intermittent asthma     EIA     Type I diabetes mellitus, uncontrolled 6/13/2016     Past Surgical History:   Procedure Laterality Date     HC REPAIR ING HERNIA,6MO-5YR,REDUC  1992    also umbilical hernia     ZZHC CREATE EARDRUM OPENING,GEN ANESTH      P.E. Tubes/ 4-5 surgeries, annual from age 10)     Family Hx:  Family History   Problem Relation Age of Onset     Hypertension Father      Depression Mother       Diabetes Paternal Uncle      Diabetes Other         2nd cousins     Cancer No family hx of      Heart Disease No family hx of            DM2:             Social Hx:  Social History     Socioeconomic History     Marital status: Single     Spouse name: Not on file     Number of children: Not on file     Years of education: Not on file     Highest education level: Not on file   Occupational History     Occupation: [employer]   Tobacco Use     Smoking status: Never     Smokeless tobacco: Never   Vaping Use     Vaping Use: Never used   Substance and Sexual Activity     Alcohol use: Yes     Alcohol/week: 0.0 standard drinks     Drug use: No     Sexual activity: Not Currently   Other Topics Concern      Service Not Asked     Blood Transfusions Not Asked     Caffeine Concern Not Asked     Occupational Exposure Not Asked     Hobby Hazards Not Asked     Sleep Concern Not Asked     Stress Concern Not Asked     Weight Concern Not Asked     Special Diet Not Asked     Back Care Not Asked     Exercise Yes     Bike Helmet Not Asked     Seat Belt Yes     Self-Exams Not Asked     Parent/sibling w/ CABG, MI or angioplasty before 65F 55M? Not Asked   Social History Narrative     Not on file     Social Determinants of Health     Financial Resource Strain: Not on file   Food Insecurity: Not on file   Transportation Needs: Not on file   Physical Activity: Not on file   Stress: Not on file   Social Connections: Not on file   Intimate Partner Violence: Not on file   Housing Stability: Not on file          MEDICATIONS:  has a current medication list which includes the following prescription(s): acetaminophen, blood glucose, blood glucose monitoring, dexcom g6 , dexcom g6 sensor, dexcom g6 transmitter, escitalopram, glucagon emergency, insulin lispro, insulin syringe-needle u-100, lisinopril-hydrochlorothiazide, metformin, and order for dme.    ROS     ROS: 10 point ROS neg other than the symptoms noted above in the  HPI.    Physical Exam   VS: There were no vitals taken for this visit.   GENERAL: healthy, alert and no distress  EYES: Eyes grossly normal to inspection, conjunctivae and sclerae normal  ENT: no nose swelling, nasal discharge.  Thyroid: no apparent thyroid nodules  RESP: no audible wheeze, cough, or visible cyanosis.  No visible retractions or increased work of breathing.  Able to speak fully in complete sentences.  ABDO: not evaluated.  EXTREMITIES: no hand tremors.  NEURO: Cranial nerves grossly intact, mentation intact and speech normal  SKIN: No apparent skin lesions, rash or edema seen   PSYCH: mentation appears normal, affect normal/bright, judgement and insight intact, normal speech and appearance well-groomed    LABS:  A1c:  Lab Results   Component Value Date    A1C 6.8 12/14/2022    A1C 7.2 04/25/2022    A1C 7.1 12/01/2021    A1C 7.2 12/15/2020    A1C 7.5 07/23/2020    A1C 7.0 06/17/2020       BMP:  Creatinine   Date Value Ref Range Status   12/14/2022 0.74 0.60 - 1.30 mg/dL Final       Urine Micro:  Lab Results   Component Value Date    UMALCR 14.52 04/25/2022    UMALCR <30 12/01/2021        LFTs/Lipids:  Recent Labs   Lab Test 12/14/22  0000 04/25/22  1008 12/15/20  0000   CHOL 232* 180 204*   HDL 59 44 51   * 86 116   TRIG 157* 248* 183*   CHOLHDLRATIO 4  --  4       TFTs:  TSH   Date Value Ref Range Status   04/25/2022 1.10 0.40 - 4.00 mU/L Final   06/15/2017 1.67 0.30 - 5.0 mcU/mL Final         Glucometer/ insulin pump (if applicable)/ CGM data (if applicable) downloaded, Personally reviewed and interpreted.  All Blood sugar data reviewed personally and discussed with pt.  See nursing note from today's clinic visit for details of BG/CGM log.  All pertinent notes, labs, and images personally reviewed by me.     A/P  Mr.Christopher ANTONIO Gibbs is a 30 year old here for the evaluation/management of diabetes:    1. DM1 - Under fair control.  A1c 6.8%.  No complications associated with diabetes.  He  is using insulin in tandem pump plus using Dexcom.  No major episodes of hypoglycemia noted/reported.   Plan:  Discussed diagnosis, pathophysiology, management and treatment options of condition with pt.  Continue metformin at current dose.  Use control IQ as much as possible.  Change I:C ratio at 9.00 PM to 1:2.5 (from 1:3.0).  Continue rest pump settings at current levels.  Continue to use insulin pump and Dexcom  Labs and follow-up in 3-4 months.    Recommend checking blood sugars before meals and at bedtime.    If Blood glucose are low more often-> 2-3 times/week- give us a call.  Make better food choices: reduce carbs, Reduce portion size, weight loss and exercise 3-4 times a week.      2. Hypertension - Takes lisinopril 10 mg    3. Hyperlipidemia - Not on medication.  High TG.  .  Discussed diagnosis, pathophysiology, management and treatment options of condition with pt.  Recommend strict BG control.  FLP in 3 months.   4. Prevention  Opthalmology: Due for eye examination  ASA-no 2/2 to age  Smoking- no      Most Recent Immunizations   Administered Date(s) Administered     COVID-19 Vaccine 12+ (Pfizer) 12/09/2021     COVID-19 Vaccine Bivalent Booster 12+ (Pfizer) 11/21/2022     Flu, Unspecified 10/12/2019     HEPA 11/01/2008     HepA-Adult 06/03/2019     HepB 01/09/2004     Hib (PRP-T) 05/27/1992     Historical DTP/aP 01/01/1996     Influenza (H1N1) 12/08/2009     Influenza (IIV3) PF 10/17/2012     Influenza Vaccine >6 months (Alfuria,Fluzone) 11/21/2022     Influenza,INJ,MDCK,PF,Quad >6mo(Flucelvax) 12/09/2021     MMR 11/14/2003     OPV, trivalent, live 12/13/1995     Pneumococcal 23 valent 11/18/2017     TD (ADULT, 7+) 11/14/2003     Tdap (Adacel,Boostrix) 11/11/2014     Recommend checking blood sugars before meals and at bedtime.    If Blood glucose are low more often-> 2-3 times/week- give us a call.  The patient is advised to Make better food choices: reduce carbs, Reduce portion size, weight loss  and exercise 3-4 times a week.  Discussed hypoglycemia signs and symptoms as well as management in detail.      Follow-up:  Follow up in 3 months    Bridgette Katz MD  Endocrinology   [facility]/[place]  CC: Jacey Mon    All questions were answered.  The patient indicates understanding of the above issues and agrees with the plan set forth.     Disclaimer: This note consists of symbols derived from keyboarding, dictation and/or voice recognition software. As a result, there may be errors in the script that have gone undetected. Please consider this when interpreting information found in this chart.                      Answers for HPI/ROS submitted by the patient on 1/30/2023  General Symptoms: No  Skin Symptoms: No  HENT Symptoms: No  EYE SYMPTOMS: No  HEART SYMPTOMS: No  LUNG SYMPTOMS: Yes  INTESTINAL SYMPTOMS: No  URINARY SYMPTOMS: No  REPRODUCTIVE SYMPTOMS: No  SKELETAL SYMPTOMS: No  BLOOD SYMPTOMS: No  NERVOUS SYSTEM SYMPTOMS: No  MENTAL HEALTH SYMPTOMS: Yes  Cough: Yes  Sputum or phlegm: Yes  Coughing up blood: No  Difficulty breating or shortness of breath: No  Snoring: No  Wheezing: No  Difficulty breathing on exertion: No  Nighttime Cough: No  Difficulty breathing when lying flat: No  Nervous or Anxious: Yes  Depression: Yes  Trouble sleeping: No  Trouble thinking or concentrating: Yes  Mood changes: Yes  Panic attacks: No          Again, thank you for allowing me to participate in the care of your patient.        Sincerely,        Bridgette Katz MD

## 2023-02-02 ENCOUNTER — TELEPHONE (OUTPATIENT)
Dept: ENDOCRINOLOGY | Facility: CLINIC | Age: 33
End: 2023-02-02
Payer: COMMERCIAL

## 2023-02-02 NOTE — TELEPHONE ENCOUNTER
Scheduled 3 month follow up with Dr. Katz, patient will schedule labs prior to appointment  Karis Canada CMA

## 2023-03-25 ENCOUNTER — HEALTH MAINTENANCE LETTER (OUTPATIENT)
Age: 33
End: 2023-03-25

## 2023-06-13 ENCOUNTER — MYC MEDICAL ADVICE (OUTPATIENT)
Dept: FAMILY MEDICINE | Facility: CLINIC | Age: 33
End: 2023-06-13

## 2023-06-13 DIAGNOSIS — I10 BENIGN ESSENTIAL HYPERTENSION: ICD-10-CM

## 2023-06-13 DIAGNOSIS — E10.9 TYPE 1 DIABETES MELLITUS WITHOUT COMPLICATION (H): ICD-10-CM

## 2023-06-14 RX ORDER — LISINOPRIL AND HYDROCHLOROTHIAZIDE 12.5; 2 MG/1; MG/1
1 TABLET ORAL DAILY
Qty: 30 TABLET | Refills: 0 | Status: SHIPPED | OUTPATIENT
Start: 2023-06-14 | End: 2023-07-17

## 2023-06-14 NOTE — TELEPHONE ENCOUNTER
Pt scheduled an appt for 6/28/23. He is needing an extension of lisinopril-hydrochlorothiazide.      Pending Prescriptions:                       Disp   Refills    lisinopril-hydrochlorothiazide (ZESTORETI*30 tab*0            Sig: Take 1 tablet by mouth daily

## 2023-07-10 ENCOUNTER — TELEPHONE (OUTPATIENT)
Dept: ENDOCRINOLOGY | Facility: CLINIC | Age: 33
End: 2023-07-10
Payer: COMMERCIAL

## 2023-07-12 ENCOUNTER — MYC MEDICAL ADVICE (OUTPATIENT)
Dept: FAMILY MEDICINE | Facility: CLINIC | Age: 33
End: 2023-07-12

## 2023-07-12 DIAGNOSIS — E10.9 TYPE 1 DIABETES MELLITUS WITHOUT COMPLICATION (H): ICD-10-CM

## 2023-07-12 DIAGNOSIS — I10 BENIGN ESSENTIAL HYPERTENSION: ICD-10-CM

## 2023-07-12 NOTE — TELEPHONE ENCOUNTER
Central Prior Authorization Team   Phone: [phone]    PA Initiation    Medication: DEXCOM G6  Insurance Company: [employer] - Phone [phone] Fax [phone]  Pharmacy Filling the Rx: [id number] - [place], [place] - [address] AT [address]  Filling Pharmacy Phone: [phone]  Filling Pharmacy Fax: [phone]  Start Date: 7/12/2023

## 2023-07-13 RX ORDER — LISINOPRIL AND HYDROCHLOROTHIAZIDE 12.5; 2 MG/1; MG/1
1 TABLET ORAL DAILY
Qty: 40 TABLET | Refills: 0 | Status: CANCELLED | OUTPATIENT
Start: 2023-07-13

## 2023-07-13 NOTE — TELEPHONE ENCOUNTER
Patient is scheduled for 8/21, needs extension of  Pending Prescriptions:                       Disp   Refills    lisinopril-hydrochlorothiazide (ZESTORETI*40 tab*0            Sig: Take 1 tablet by mouth daily    Routing to Verito for approval.

## 2023-07-13 NOTE — TELEPHONE ENCOUNTER
Prior Authorization Approval    Authorization Effective Date: 6/12/2023  Authorization Expiration Date: 7/11/2024  Medication: DEXCOM G6 - APPROVED  Approved Dose/Quantity:    Reference #:     Insurance Company: [employer]s - Phone [phone] Fax [phone]  Expected CoPay:       CoPay Card Available:      Foundation Assistance Needed:    Which Pharmacy is filling the prescription (Not needed for infusion/clinic administered): [id number] - [address]  Pharmacy Notified: Yes  Patient Notified: Yes  **Instructed pharmacy to notify patient when script is ready to /ship.**

## 2023-07-17 RX ORDER — LISINOPRIL AND HYDROCHLOROTHIAZIDE 12.5; 2 MG/1; MG/1
1 TABLET ORAL DAILY
Qty: 30 TABLET | Refills: 0 | Status: SHIPPED | OUTPATIENT
Start: 2023-07-17 | End: 2023-08-15

## 2023-07-17 NOTE — TELEPHONE ENCOUNTER
Can you send for SRB patient is out, has appt 08/01    Gonzalez Gibbs is requesting a refill of:    Pending Prescriptions:                       Disp   Refills    lisinopril-hydrochlorothiazide (ZESTORETI*30 tab*0            Sig: Take 1 tablet by mouth daily

## 2023-07-17 NOTE — TELEPHONE ENCOUNTER
Pt needs 30 day extension. Let him know you have many appts available sooner.    Gonzalez Gibbs is requesting a refill of:    Pending Prescriptions:                       Disp   Refills    lisinopril-hydrochlorothiazide (ZESTORETI*30 tab*0            Sig: Take 1 tablet by mouth daily

## 2023-08-15 ENCOUNTER — OFFICE VISIT (OUTPATIENT)
Dept: FAMILY MEDICINE | Facility: CLINIC | Age: 33
End: 2023-08-15

## 2023-08-15 VITALS
OXYGEN SATURATION: 96 % | DIASTOLIC BLOOD PRESSURE: 78 MMHG | WEIGHT: 277 LBS | BODY MASS INDEX: 41.03 KG/M2 | HEART RATE: 68 BPM | TEMPERATURE: 98 F | SYSTOLIC BLOOD PRESSURE: 120 MMHG | HEIGHT: 69 IN

## 2023-08-15 DIAGNOSIS — I10 BENIGN ESSENTIAL HYPERTENSION: Primary | ICD-10-CM

## 2023-08-15 DIAGNOSIS — E66.01 MORBID OBESITY (H): ICD-10-CM

## 2023-08-15 DIAGNOSIS — E10.9 TYPE 1 DIABETES MELLITUS WITHOUT COMPLICATION (H): ICD-10-CM

## 2023-08-15 LAB
ALBUMIN (URINE) MG/L: 10
ALBUMIN URINE MG/G CR: <30 MG/G CREATININE
BUN SERPL-MCNC: 12 MG/DL (ref 7–25)
BUN/CREATININE RATIO: 15.2 (ref 6–32)
CALCIUM SERPL-MCNC: 9.5 MG/DL (ref 8.6–10.3)
CHLORIDE SERPLBLD-SCNC: 100.4 MMOL/L (ref 98–110)
CHOLEST SERPL-MCNC: 206 MG/DL (ref 0–199)
CHOLEST/HDLC SERPL: 3 {RATIO} (ref 0–5)
CO2 SERPL-SCNC: 28.4 MMOL/L (ref 20–32)
CREAT SERPL-MCNC: 0.79 MG/DL (ref 0.6–1.3)
CREATININE URINE MG/DL: 100 MG/DL
GLUCOSE SERPL-MCNC: 204 MG/DL (ref 60–99)
HBA1C MFR BLD: 7.1 % (ref 4–7)
HDLC SERPL-MCNC: 60 MG/DL (ref 40–150)
LDLC SERPL CALC-MCNC: 129 MG/DL (ref 0–130)
POTASSIUM SERPL-SCNC: 4.39 MMOL/L (ref 3.5–5.3)
SODIUM SERPL-SCNC: 135.3 MMOL/L (ref 135–146)
TRIGL SERPL-MCNC: 84 MG/DL (ref 0–149)

## 2023-08-15 PROCEDURE — 80061 LIPID PANEL: CPT | Performed by: PHYSICIAN ASSISTANT

## 2023-08-15 PROCEDURE — 36415 COLL VENOUS BLD VENIPUNCTURE: CPT | Performed by: PHYSICIAN ASSISTANT

## 2023-08-15 PROCEDURE — 83036 HEMOGLOBIN GLYCOSYLATED A1C: CPT | Performed by: PHYSICIAN ASSISTANT

## 2023-08-15 PROCEDURE — 82043 UR ALBUMIN QUANTITATIVE: CPT | Performed by: PHYSICIAN ASSISTANT

## 2023-08-15 PROCEDURE — 99213 OFFICE O/P EST LOW 20 MIN: CPT | Performed by: PHYSICIAN ASSISTANT

## 2023-08-15 PROCEDURE — 80048 BASIC METABOLIC PNL TOTAL CA: CPT | Performed by: PHYSICIAN ASSISTANT

## 2023-08-15 PROCEDURE — 82570 ASSAY OF URINE CREATININE: CPT | Performed by: PHYSICIAN ASSISTANT

## 2023-08-15 RX ORDER — LISINOPRIL AND HYDROCHLOROTHIAZIDE 12.5; 2 MG/1; MG/1
1 TABLET ORAL DAILY
Qty: 90 TABLET | Refills: 1 | Status: SHIPPED | OUTPATIENT
Start: 2023-08-15 | End: 2024-02-19

## 2023-08-15 NOTE — PROGRESS NOTES
"CC: Medication Check    History:  HTN:  Pt is treated for HTN. Takes lisinopril-hctz. Has been taking consistently. Denies side effects. Does check BP at home, and getting improved numbers after recent weight loss 120s/75-85. Last eye exam was 3 years ago, but just recently got eye coverage, so will schedule. Denies any chest pain, palpitations, SOB. Has been staying active with new job walking 8 miles daily.       Anxiety, Depression:  Stable on escitalopram 15 mg. Symptoms seem to be controlled. Has not had a panic attack in at least 1 year. Had stopped therapy for a period due to insurance issues, but plans to restart this soon.  Denies any side effects. No SI/HI.     Obesity:  Had gained 10 lbs at 1/2023 appt, but has now lost 20 lbs since that time with more activity.    DM1:  Works with endocrinology Dr. Katz, and is due for follow-up. Agrees to schedule. We can complete fasting labs that endocrinology had ordered today.    PMH, MEDICATIONS, ALLERGIES, SOCIAL AND FAMILY HISTORY in [facility] and reviewed by me personally.    ROS negative other than the symptoms noted above in the HPI.      Examination   /78 (BP Location: Left arm, Patient Position: Sitting, Cuff Size: Adult Large)   Pulse 68   Temp 98  F (36.7  C) (Temporal)   Ht 1.746 m (5' 8.75\")   Wt 125.6 kg (277 lb)   SpO2 96%   BMI 41.20 kg/m       Constitutional: Sitting comfortably, in no acute distress. Vital signs noted  Neck:  no adenopathy, trachea midline and normal to palpation  Cardiovascular:  regular rate and rhythm, no murmurs, clicks, or gallops  Respiratory:  normal respiratory rate and rhythm, lungs clear to auscultation  SKIN: No jaundice/pallor/rash.   Psychiatric: mentation appears normal and affect normal/bright      A/P    ICD-10-CM    1. Benign essential hypertension  I10 VENOUS COLLECTION     Basic Metabolic Panel (BFP)     lisinopril-hydrochlorothiazide (ZESTORETIC) 20-12.5 MG tablet      2. Type 1 diabetes mellitus " without complication (H)  E10.9 lisinopril-hydrochlorothiazide (ZESTORETIC) 20-12.5 MG tablet     Lipid Panel (BFP)     TSH WITH FREE T4 REFLEX (QUEST)     ALBUMIN RANDOM URINE QUANTITATIVE (BFP)     HEMOGLOBIN A1C (BFP)          DISCUSSION:  HTN:  BP controlled today and at home. Will update fasting labs and send MyChart. Refilled for 6 months, but would consider extending another 6 months if he sees endocrinologist this winter in person (or podiatry for foot exam) and has labs updated.      Anxiety, Depression:  PHQ-9 and ANTONETTE-7 updated today and well controlled. Agreed to refill current medication without change for 1 year. Contact us sooner with concerns, worsening. Not needing refills today.    Obesity:  Congratulated him on weight loss.     DM1:  Continue working closely with endocrinologist.     follow up visit: 6 months, could consider 1 year, if pt does in person visit with endocrinologist.     Verito Molina PA-C  [facility]

## 2023-08-15 NOTE — NURSING NOTE
Chief Complaint   Patient presents with    Recheck Medication     Fasting bp med check         Pre-visit Screening:  Immunizations:  up to date  Colonoscopy:  NA  Mammogram: NA  Asthma Action Test/Plan:  NA  PHQ9:  NA  GAD7:  NA  Questioned patient about current smoking habits Pt. has never smoked.  Ok to leave detailed message on voice mail for today's visit only Yes, phone # [phone]

## 2023-08-16 LAB — TSH SERPL-ACNC: 0.82 MIU/L (ref 0.4–4.5)

## 2023-09-09 DIAGNOSIS — E10.9 TYPE 1 DIABETES MELLITUS WITHOUT COMPLICATION (H): Primary | ICD-10-CM

## 2023-09-11 RX ORDER — INSULIN LISPRO 100 [IU]/ML
INJECTION, SOLUTION INTRAVENOUS; SUBCUTANEOUS
Qty: 120 ML | Refills: 0 | Status: SHIPPED | OUTPATIENT
Start: 2023-09-11 | End: 2023-12-11

## 2023-09-11 NOTE — TELEPHONE ENCOUNTER
[facility] Call Center    Phone Message    May a detailed message be left on voicemail: yes     Reason for Call: Other: Per pt has an appt on 10/17/23 now. Please refill pt medication. Thank you!     Action Taken: Message routed to:  [facility] ENDO    Travel Screening: Not Applicable

## 2023-09-11 NOTE — TELEPHONE ENCOUNTER
"Requested Prescriptions   Signed Prescriptions Disp Refills    insulin lispro (HUMALOG VIAL) 100 UNIT/ML vial 120 mL 0     Sig: INJECT UP  UNITS DAILY VIA PUMP       Short Acting Insulin Protocol Failed - 9/11/2023  1:53 PM        Failed - Recent (6 mo) or future (30 days) visit within the authorizing provider's specialty     Patient had office visit in the last 6 months or has a visit in the next 30 days with authorizing provider or within the authorizing provider's specialty.  See \"Patient Info\" tab in inbasket, or \"Choose Columns\" in Meds & Orders section of the refill encounter.            Passed - Serum creatinine on file in past 12 months     Recent Labs   Lab Test 08/15/23  1503   CR 0.79       Ok to refill medication if creatinine is low          Passed - HgbA1C in past 3 or 6 months     If HgbA1C is 8 or greater, it needs to be on file within the past 3 months.  If less than 8, must be on file within the past 6 months.     Recent Labs   Lab Test 08/15/23  1415   A1C 7.1*             Passed - Medication is active on med list        Passed - Patient is age 18 or older             "

## 2023-09-13 ENCOUNTER — TELEPHONE (OUTPATIENT)
Dept: FAMILY MEDICINE | Facility: CLINIC | Age: 33
End: 2023-09-13

## 2023-09-13 ENCOUNTER — MYC MEDICAL ADVICE (OUTPATIENT)
Dept: FAMILY MEDICINE | Facility: CLINIC | Age: 33
End: 2023-09-13

## 2023-09-13 DIAGNOSIS — F41.0 GENERALIZED ANXIETY DISORDER WITH PANIC ATTACKS: ICD-10-CM

## 2023-09-13 DIAGNOSIS — F41.1 GENERALIZED ANXIETY DISORDER WITH PANIC ATTACKS: ICD-10-CM

## 2023-09-13 RX ORDER — INSULIN LISPRO 100 [IU]/ML
INJECTION, SOLUTION INTRAVENOUS; SUBCUTANEOUS
Qty: 120 ML | Refills: 1 | OUTPATIENT
Start: 2023-09-13

## 2023-09-13 NOTE — TELEPHONE ENCOUNTER
PA for Escitalopram was denied. Not a covered medication under his benefit plan.    I tried under [employer] and [employer] and was denied.   Have you heard of this happening before?

## 2023-09-14 RX ORDER — ESCITALOPRAM OXALATE 10 MG/1
15 TABLET ORAL DAILY
Qty: 135 TABLET | Refills: 3 | Status: CANCELLED | OUTPATIENT
Start: 2023-09-14

## 2023-09-14 RX ORDER — ESCITALOPRAM OXALATE 10 MG
15 TABLET ORAL DAILY
Qty: 135 TABLET | Refills: 3 | Status: SHIPPED | OUTPATIENT
Start: 2023-09-14 | End: 2023-09-15

## 2023-09-14 NOTE — TELEPHONE ENCOUNTER
Gonzalez Gibbs is requesting a refill of:    Pending Prescriptions:                       Disp   Refills    escitalopram (LEXAPRO) 10 MG tablet       135 ta*3            Sig: Take 1.5 tablets (15 mg) by mouth daily    The generic is not covered. Can you send in the name brand?  Pt states that it should be covered

## 2023-09-15 RX ORDER — ESCITALOPRAM OXALATE 5 MG/1
5 TABLET ORAL DAILY
Qty: 90 TABLET | Refills: 1 | Status: SHIPPED | OUTPATIENT
Start: 2023-09-15 | End: 2024-03-14

## 2023-09-15 RX ORDER — ESCITALOPRAM OXALATE 10 MG/1
10 TABLET ORAL DAILY
Qty: 90 TABLET | Refills: 1 | Status: SHIPPED | OUTPATIENT
Start: 2023-09-15 | End: 2024-03-14

## 2023-09-15 RX ORDER — ESCITALOPRAM OXALATE 10 MG/1
15 TABLET ORAL DAILY
Qty: 135 TABLET | Refills: 1 | Status: SHIPPED | OUTPATIENT
Start: 2023-09-15 | End: 2023-09-15

## 2023-09-15 NOTE — TELEPHONE ENCOUNTER
I tried a PA for the 15 mg and it was not approved    We can try 2 doses to see if covered that way  Gonzalez Gibbs is requesting a refill of:    Pending Prescriptions:                       Disp   Refills    escitalopram (LEXAPRO) 5 MG tablet        90 tab*1            Sig: Take 1 tablet (5 mg) by mouth daily With the 10mg    escitalopram (LEXAPRO) 10 MG tablet       90 tab*1            Sig: Take 1 tablet (10 mg) by mouth daily With the 5mg

## 2023-09-15 NOTE — TELEPHONE ENCOUNTER
Jodi, looking at the strength, I am guessing they want a PA for the 1.5 tablets per day, not necessarily the actual medication.

## 2023-09-15 NOTE — TELEPHONE ENCOUNTER
Gonzalez Gibbs is requesting a refill of:    Pending Prescriptions:                       Disp   Refills    escitalopram (LEXAPRO) 10 MG tablet       135 ta*1            Sig: Take 1.5 tablets (15 mg) by mouth daily    Signed Prescriptions:                        Disp   Refills    LEXAPRO 10 MG tablet                       135 ta*3        Sig: Take 1.5 tablets (15 mg) by mouth daily  Authorizing Provider: BRIELLE TARANGO

## 2023-10-09 NOTE — PROGRESS NOTES
Outcome for 10/09/23 2:32 PM: Data uploaded on Dexcom  Vidya Ventura MA  Outcome for 10/09/23 2:32 PM: [employer] message sent  Vidya Ventura MA  Outcome for 10/13/23 2:23 PM: Left Voicemail   Vidya Ventura MA  Outcome for 10/16/23 10:43 AM: Left Voicemail   Juany Arora MA  Outcome for 10/16/23 1:11 PM: Replied to [employer] message  Vidya Ventura MA  Outcome for 10/17/23 10:47 AM: Data obtained via Dexcom and Tandem website  Juany Arora MA

## 2023-10-13 ENCOUNTER — TELEPHONE (OUTPATIENT)
Dept: ENDOCRINOLOGY | Facility: CLINIC | Age: 33
End: 2023-10-13
Payer: COMMERCIAL

## 2023-10-13 NOTE — TELEPHONE ENCOUNTER
Called patient and left voicemail. Patient has an appointment on  10/17/23 . Need patient to upload their Tandem device to site for provider to review prior to their appointment.  Vidya Ventura MA

## 2023-10-16 NOTE — TELEPHONE ENCOUNTER
Attempted to reach patient for upcoming appointment; tandem data needed. No answer, LM on VM to call office back.     Appointment with Bridgette Katz MD on 10/17/23    Juany Arora MA

## 2023-10-17 ENCOUNTER — VIRTUAL VISIT (OUTPATIENT)
Dept: ENDOCRINOLOGY | Facility: CLINIC | Age: 33
End: 2023-10-17
Payer: COMMERCIAL

## 2023-10-17 DIAGNOSIS — E10.9 TYPE 1 DIABETES MELLITUS WITHOUT COMPLICATION (H): Primary | ICD-10-CM

## 2023-10-17 PROCEDURE — 95251 CONT GLUC MNTR ANALYSIS I&R: CPT | Performed by: INTERNAL MEDICINE

## 2023-10-17 PROCEDURE — 99214 OFFICE O/P EST MOD 30 MIN: CPT | Mod: VID | Performed by: INTERNAL MEDICINE

## 2023-10-17 NOTE — LETTER
"    10/17/2023         RE: Gonzalez Gibbs  [address] [facility] [id number]        Dear Colleague,    Thank you for referring your patient, Gonzalez Gibbs, to the [facility]. Please see a copy of my visit note below.    Outcome for 10/09/23 2:32 PM: Data uploaded on Dexcom  Vidya Ventura MA  Outcome for 10/09/23 2:32 PM: [employer] message sent  Vidya Ventura MA  Outcome for 10/13/23 2:23 PM: Left Voicemail   Vidya Ventura MA  Outcome for 10/16/23 10:43 AM: Left Voicemail   Juany Arora MA  Outcome for 10/16/23 1:11 PM: Replied to [employer] message  Vidya Ventura MA  Outcome for 10/17/23 10:47 AM: Data obtained via Dexcom and Tandem website  Juany Arora MA      THIS IS A VIDEO VISIT:    Phone call visit/virtual visit encounter:    Name of patient: Gonzalez Gibbs    Date of encounter: 10/17/2023    Time of start of video visit: 11:00    Video started: 11:12    Video ended: 11:22    Provider location: working from home/ [facility]    Patient location: patients home.    Mode of transmission: [employer] video/ [employer]    Verbal consent: obtained before starting visit. Pt is agreeable.      The patient has been notified of following:      \"This VIDEO visit will be conducted via a call between you and your physician/provider. We have found that certain health care needs can be provided without the need for a physical exam.  This service lets us provide the care you need with a short phone conversation.  If a prescription is necessary we can send it directly to your pharmacy.  If lab work is needed we can place an order for that and you can then stop by our lab to have the test done at a later time.     With new updates with corona virus patient might be billed as clinic visit.     If during the course of the call the physician/provider feels a telephone visit is not appropriate, you will not be charged for this service.\"      Past medical " history, social history, family history, allergy and medications were reviewed and updated as appropriate.  Reviewed pertinent labs, notes, imaging studies personally.    Endocrinology Clinic Note:  Name: Gonzalez Gibbs  Seen for Diabetes f/u.   HPI:  Gonzalez Gibbs is a 33 year old male who presents for the evaluation/management of type 1 diabetes.  Was getting care at endocrinology clinic of [place] since last 2016 visit. Records requested.  Was On insulin pump for last 10-12 years.    Now using TANDEM T-slim insulin pump + DEXCOM.   Using Novolog in pump.  Using  insulin pump with Dexcom.    CGM data reviewed.  Typically skips breakfast. Has lunch at 1:00 PM and dinner is at 5:30 PM- 7:00 PM. Snacks at 9:00 Pm lke fruits, fruit snacks.  Dinner times are variable.  He is working at [employer] and is lifting weights and walking more.  He is afraid of lows and was using temp rate more.  Was hesitant about control IQ.    Reports that he has long acting insulin in case of pump malfunction at home. Gonzalez also has glucagon kit at home for emergency.    1. Type 1 DM:  Orginally diagnosed at the age of: 13 years   Hospitalization for DKA: None  Current Regimen:Tandem  Insulin pump and metformin 1000 mg BID  BS checks: 3-4 times a day  Average Meter Download: Blood glucose data reviewed personally. See nursing note from this encounter for details.  Exercise: Few times a week  Last A1c: 7.2%  Episodes of hypoglycemia (low blood sugar): Occasional.  Gets symptoms  Fixed meal pattern: Yes  Patient counting carbs: Yes  Using PUMP: Yes.   Current Regimen:   (pump settings reviewed with pt on video)  Using control IQ 57%        DM Complications:   Nephropathy: No  Retinopathy: No   Neuropathy: No  Microalbuminuria: No  CAD/PAD: No  Gastroparesis: No  Hypoglycemia unawareness: No     2. Hypertension: Blood Pressure today:   BP Readings from Last 3 Encounters:   08/15/23 120/78   12/14/22 130/84   08/04/22  128/84      Blood pressure medications include lisinopril 10 mg.    3. Hyperlipidemia:  Not on medication.     PMH/PSH:  Past Medical History:   Diagnosis Date     Family history of diabetes mellitus      Mild intermittent asthma     EIA     Mild intermittent asthma     EIA     Type I diabetes mellitus, uncontrolled 6/13/2016     Past Surgical History:   Procedure Laterality Date     HC REPAIR ING HERNIA,6MO-5YR,REDUC  1992    also umbilical hernia     ZZHC CREATE EARDRUM OPENING,GEN ANESTH      P.E. Tubes/ 4-5 surgeries, annual from age 10)     Family Hx:  Family History   Problem Relation Age of Onset     Hypertension Father      Depression Mother      Diabetes Paternal Uncle      Diabetes Other         2nd cousins     Cancer No family hx of      Heart Disease No family hx of            DM2:             Social Hx:  Social History     Socioeconomic History     Marital status: Single     Spouse name: Not on file     Number of children: Not on file     Years of education: Not on file     Highest education level: Not on file   Occupational History     Occupation: [employer]   Tobacco Use     Smoking status: Never     Passive exposure: Never     Smokeless tobacco: Never   Vaping Use     Vaping Use: Never used   Substance and Sexual Activity     Alcohol use: Yes     Alcohol/week: 0.0 standard drinks of alcohol     Drug use: No     Sexual activity: Not Currently   Other Topics Concern      Service Not Asked     Blood Transfusions Not Asked     Caffeine Concern Not Asked     Occupational Exposure Not Asked     Hobby Hazards Not Asked     Sleep Concern Not Asked     Stress Concern Not Asked     Weight Concern Not Asked     Special Diet Not Asked     Back Care Not Asked     Exercise Yes     Bike Helmet Not Asked     Seat Belt Yes     Self-Exams Not Asked     Parent/sibling w/ CABG, MI or angioplasty before 65F 55M? Not Asked   Social History Narrative     Not on file     Social Determinants of Health     Financial  Resource Strain: Not on file   Food Insecurity: Not on file   Transportation Needs: Not on file   Physical Activity: Not on file   Stress: Not on file   Social Connections: Not on file   Interpersonal Safety: Not on file   Housing Stability: Not on file          MEDICATIONS:  has a current medication list which includes the following prescription(s): acetaminophen, blood glucose, blood glucose monitoring, dexcom g6 , dexcom g6 sensor, dexcom g6 transmitter, escitalopram, escitalopram, glucagon emergency, insulin lispro, insulin syringe-needle u-100, lisinopril-hydrochlorothiazide, metformin, and order for dme.    ROS     ROS: 10 point ROS neg other than the symptoms noted above in the HPI.    Physical Exam   VS: There were no vitals taken for this visit.   GENERAL: healthy, alert and no distress  EYES: Eyes grossly normal to inspection, conjunctivae and sclerae normal  ENT: no nose swelling, nasal discharge.  Thyroid: no apparent thyroid nodules  RESP: no audible wheeze, cough, or visible cyanosis.  No visible retractions or increased work of breathing.  Able to speak fully in complete sentences.  ABDO: not evaluated.  EXTREMITIES: no hand tremors.  NEURO: Cranial nerves grossly intact, mentation intact and speech normal  SKIN: No apparent skin lesions, rash or edema seen   PSYCH: mentation appears normal, affect normal/bright, judgement and insight intact, normal speech and appearance well-groomed    LABS:  A1c:  Lab Results   Component Value Date    A1C 7.1 08/15/2023    A1C 6.8 12/14/2022    A1C 7.2 04/25/2022    A1C 7.1 12/01/2021    A1C 7.2 12/15/2020    A1C 7.5 07/23/2020     BMP:  Creatinine   Date Value Ref Range Status   08/15/2023 0.79 0.60 - 1.30 mg/dL Final     Urine Micro:  Lab Results   Component Value Date    UMALCR <30 08/15/2023       LFTs/Lipids:  Recent Labs   Lab Test 08/15/23  1502 12/14/22  0000   CHOL 206* 232*   HDL 60 59    142*   TRIG 84 157*   CHOLHDLRATIO 3 4     TFTs:  TSH    Date Value Ref Range Status   08/15/2023 0.82 0.40 - 4.50 mIU/L Final         Glucometer/ insulin pump (if applicable)/ CGM data (if applicable) downloaded, Personally reviewed and interpreted.  All Blood sugar data reviewed personally and discussed with pt.  See nursing note from today's clinic visit for details of BG/CGM log.  All pertinent notes, labs, and images personally reviewed by me.     A/P  Mr.Christopher ANTONIO Gibbs is a 33 year old here for the evaluation/management of diabetes:    1. DM1 - Under fair control.  A1c 6.8%.  No complications associated with diabetes.  He is using insulin in tandem pump plus using Dexcom.  No major episodes of hypoglycemia noted/reported.   He is using control IQ only 57%. Noted high BG when in manual mode especially in afternoon after meals.  He is active at work and afraid of lows.  Plan:  Discussed diagnosis, pathophysiology, management and treatment options of condition with pt.  Continue metformin 1000 mg twice a day  Continue current pump settings.  Try to use control IQ as much as possible.  Follow up with Joslyn GLASS in 3 months with labs prior.  Repeat labs and follow up with  in 6 months.  Please make a lab appointment for blood work and follow up clinic appointment in 1 week after that to discuss results.    Recommend checking blood sugars before meals and at bedtime.    If Blood glucose are low more often-> 2-3 times/week- give us a call.  Make better food choices: reduce carbs, Reduce portion size, weight loss and exercise 3-4 times a week.      2. Hypertension - Takes lisinopril 10 mg    3. Hyperlipidemia - Not on medication.  High TG.  .  Recommend strict BG control.  Managed by PCP.   4. Prevention  Opthalmology: Due for eye examination  ASA-no 2/2 to age  Smoking- no      Most Recent Immunizations   Administered Date(s) Administered     COVID-19 Bivalent 12+ (Pfizer) 11/21/2022     COVID-19 MONOVALENT 12+ (Pfizer) 12/09/2021     Flu,  Unspecified 10/12/2019     HEPA 11/01/2008     HIB (PRP-T) 05/27/1992     HepB 01/09/2004     Hepatitis A (ADULT 19+) 06/03/2019     Historical DTP/aP 01/01/1996     Influenza (H1N1) 12/08/2009     Influenza (IIV3) PF 10/17/2012     Influenza Vaccine >6 months (Alfuria,Fluzone) 11/21/2022     Influenza,INJ,MDCK,PF,Quad >6mo(Flucelvax) 12/09/2021     MMR 11/14/2003     OPV, trivalent, live 12/13/1995     Pneumococcal 23 valent 11/18/2017     TD,PF 7+ (Tenivac) 11/14/2003     TDAP (Adacel,Boostrix) 11/11/2014       Recommend checking blood sugars before meals and at bedtime.    If Blood glucose are low more often-> 2-3 times/week- give us a call.  The patient is advised to Make better food choices: reduce carbs, Reduce portion size, weight loss and exercise 3-4 times a week.  Discussed hypoglycemia signs and symptoms as well as management in detail.      Follow-up:  As noted in AVS    Bridgette Katz MD  Endocrinology   [facility]/[place]  CC: Jacey Mon    All questions were answered.  The patient indicates understanding of the above issues and agrees with the plan set forth.     Disclaimer: This note consists of symbols derived from keyboarding, dictation and/or voice recognition software. As a result, there may be errors in the script that have gone undetected. Please consider this when interpreting information found in this chart.                            Again, thank you for allowing me to participate in the care of your patient.        Sincerely,        Bridgette Katz MD

## 2023-10-17 NOTE — PROGRESS NOTES
"THIS IS A VIDEO VISIT:    Phone call visit/virtual visit encounter:    Name of patient: Gonzalez Gibbs    Date of encounter: 10/17/2023    Time of start of video visit: 11:00    Video started: 11:12    Video ended: 11:22    Provider location: working from [place]/ [facility]    Patient location: patients home.    Mode of transmission: [employer] video/ [employer]    Verbal consent: obtained before starting visit. Pt is agreeable.      The patient has been notified of following:      \"This VIDEO visit will be conducted via a call between you and your physician/provider. We have found that certain health care needs can be provided without the need for a physical exam.  This service lets us provide the care you need with a short phone conversation.  If a prescription is necessary we can send it directly to your pharmacy.  If lab work is needed we can place an order for that and you can then stop by our lab to have the test done at a later time.     With new updates with corona virus patient might be billed as clinic visit.     If during the course of the call the physician/provider feels a telephone visit is not appropriate, you will not be charged for this service.\"      Past medical history, social history, family history, allergy and medications were reviewed and updated as appropriate.  Reviewed pertinent labs, notes, imaging studies personally.    Endocrinology Clinic Note:  Name: Gonzalez Gibbs  Seen for Diabetes f/u.   HPI:  Gonzalez Gibbs is a 33 year old male who presents for the evaluation/management of type 1 diabetes.  Was getting care at endocrinology clinic of [place] since last 2016 visit. Records requested.  Was On insulin pump for last 10-12 years.    Now using TANDEM T-slim insulin pump + DEXCOM.   Using Novolog in pump.  Using  insulin pump with Dexcom.    CGM data reviewed.  Typically skips breakfast. Has lunch at 1:00 PM and dinner is at 5:30 PM- 7:00 PM. Snacks at 9:00 Pm " lke fruits, fruit snacks.  Dinner times are variable.  He is working at [employer] and is lifting weights and walking more.  He is afraid of lows and was using temp rate more.  Was hesitant about control IQ.    Reports that he has long acting insulin in case of pump malfunction at home. Gonzalez also has glucagon kit at home for emergency.    1. Type 1 DM:  Orginally diagnosed at the age of: 13 years   Hospitalization for DKA: None  Current Regimen:Tandem  Insulin pump and metformin 1000 mg BID  BS checks: 3-4 times a day  Average Meter Download: Blood glucose data reviewed personally. See nursing note from this encounter for details.  Exercise: Few times a week  Last A1c: 7.2%  Episodes of hypoglycemia (low blood sugar): Occasional.  Gets symptoms  Fixed meal pattern: Yes  Patient counting carbs: Yes  Using PUMP: Yes.   Current Regimen:   (pump settings reviewed with pt on video)  Using control IQ 57%        DM Complications:   Nephropathy: No  Retinopathy: No   Neuropathy: No  Microalbuminuria: No  CAD/PAD: No  Gastroparesis: No  Hypoglycemia unawareness: No     2. Hypertension: Blood Pressure today:   BP Readings from Last 3 Encounters:   08/15/23 120/78   12/14/22 130/84   08/04/22 128/84      Blood pressure medications include lisinopril 10 mg.    3. Hyperlipidemia:  Not on medication.     PMH/PSH:  Past Medical History:   Diagnosis Date    Family history of diabetes mellitus     Mild intermittent asthma     EIA    Mild intermittent asthma     EIA    Type I diabetes mellitus, uncontrolled 6/13/2016     Past Surgical History:   Procedure Laterality Date    HC REPAIR ING HERNIA,6MO-5YR,REDUC  1992    also umbilical hernia    ZZHC CREATE EARDRUM OPENING,GEN ANESTH      P.E. Tubes/ 4-5 surgeries, annual from age 10)     Family Hx:  Family History   Problem Relation Age of Onset    Hypertension Father     Depression Mother     Diabetes Paternal Uncle     Diabetes Other         2nd cousins    Cancer No family hx of      Heart Disease No family hx of            DM2:             Social Hx:  Social History     Socioeconomic History    Marital status: Single     Spouse name: Not on file    Number of children: Not on file    Years of education: Not on file    Highest education level: Not on file   Occupational History    Occupation: [employer]   Tobacco Use    Smoking status: Never     Passive exposure: Never    Smokeless tobacco: Never   Vaping Use    Vaping Use: Never used   Substance and Sexual Activity    Alcohol use: Yes     Alcohol/week: 0.0 standard drinks of alcohol    Drug use: No    Sexual activity: Not Currently   Other Topics Concern     Service Not Asked    Blood Transfusions Not Asked    Caffeine Concern Not Asked    Occupational Exposure Not Asked    Hobby Hazards Not Asked    Sleep Concern Not Asked    Stress Concern Not Asked    Weight Concern Not Asked    Special Diet Not Asked    Back Care Not Asked    Exercise Yes    Bike Helmet Not Asked    Seat Belt Yes    Self-Exams Not Asked    Parent/sibling w/ CABG, MI or angioplasty before 65F 55M? Not Asked   Social History Narrative    Not on file     Social Determinants of Health     Financial Resource Strain: Not on file   Food Insecurity: Not on file   Transportation Needs: Not on file   Physical Activity: Not on file   Stress: Not on file   Social Connections: Not on file   Interpersonal Safety: Not on file   Housing Stability: Not on file          MEDICATIONS:  has a current medication list which includes the following prescription(s): acetaminophen, blood glucose, blood glucose monitoring, dexcom g6 , dexcom g6 sensor, dexcom g6 transmitter, escitalopram, escitalopram, glucagon emergency, insulin lispro, insulin syringe-needle u-100, lisinopril-hydrochlorothiazide, metformin, and order for dme.    ROS     ROS: 10 point ROS neg other than the symptoms noted above in the HPI.    Physical Exam   VS: There were no vitals taken for this visit.   GENERAL:  healthy, alert and no distress  EYES: Eyes grossly normal to inspection, conjunctivae and sclerae normal  ENT: no nose swelling, nasal discharge.  Thyroid: no apparent thyroid nodules  RESP: no audible wheeze, cough, or visible cyanosis.  No visible retractions or increased work of breathing.  Able to speak fully in complete sentences.  ABDO: not evaluated.  EXTREMITIES: no hand tremors.  NEURO: Cranial nerves grossly intact, mentation intact and speech normal  SKIN: No apparent skin lesions, rash or edema seen   PSYCH: mentation appears normal, affect normal/bright, judgement and insight intact, normal speech and appearance well-groomed    LABS:  A1c:  Lab Results   Component Value Date    A1C 7.1 08/15/2023    A1C 6.8 12/14/2022    A1C 7.2 04/25/2022    A1C 7.1 12/01/2021    A1C 7.2 12/15/2020    A1C 7.5 07/23/2020     BMP:  Creatinine   Date Value Ref Range Status   08/15/2023 0.79 0.60 - 1.30 mg/dL Final     Urine Micro:  Lab Results   Component Value Date    UMALCR <30 08/15/2023       LFTs/Lipids:  Recent Labs   Lab Test 08/15/23  1502 12/14/22  0000   CHOL 206* 232*   HDL 60 59    142*   TRIG 84 157*   CHOLHDLRATIO 3 4     TFTs:  TSH   Date Value Ref Range Status   08/15/2023 0.82 0.40 - 4.50 mIU/L Final         Glucometer/ insulin pump (if applicable)/ CGM data (if applicable) downloaded, Personally reviewed and interpreted.  All Blood sugar data reviewed personally and discussed with pt.  See nursing note from today's clinic visit for details of BG/CGM log.  All pertinent notes, labs, and images personally reviewed by me.     A/P  Mr.Christopher ANTONIO Gibbs is a 33 year old here for the evaluation/management of diabetes:    1. DM1 - Under fair control.  A1c 6.8%.  No complications associated with diabetes.  He is using insulin in tandem pump plus using Dexcom.  No major episodes of hypoglycemia noted/reported.   He is using control IQ only 57%. Noted high BG when in manual mode especially in afternoon  after meals.  He is active at work and afraid of lows.  Plan:  Discussed diagnosis, pathophysiology, management and treatment options of condition with pt.  Continue metformin 1000 mg twice a day  Continue current pump settings.  Try to use control IQ as much as possible.  Follow up with Joslyn GLASS in 3 months with labs prior.  Repeat labs and follow up with  in 6 months.  Please make a lab appointment for blood work and follow up clinic appointment in 1 week after that to discuss results.    Recommend checking blood sugars before meals and at bedtime.    If Blood glucose are low more often-> 2-3 times/week- give us a call.  Make better food choices: reduce carbs, Reduce portion size, weight loss and exercise 3-4 times a week.      2. Hypertension - Takes lisinopril 10 mg    3. Hyperlipidemia - Not on medication.  High TG.  .  Recommend strict BG control.  Managed by PCP.   4. Prevention  Opthalmology: Due for eye examination  ASA-no 2/2 to age  Smoking- no      Most Recent Immunizations   Administered Date(s) Administered    COVID-19 Bivalent 12+ (Pfizer) 11/21/2022    COVID-19 MONOVALENT 12+ (Pfizer) 12/09/2021    Flu, Unspecified 10/12/2019    HEPA 11/01/2008    HIB (PRP-T) 05/27/1992    HepB 01/09/2004    Hepatitis A (ADULT 19+) 06/03/2019    Historical DTP/aP 01/01/1996    Influenza (H1N1) 12/08/2009    Influenza (IIV3) PF 10/17/2012    Influenza Vaccine >6 months (Alfuria,Fluzone) 11/21/2022    Influenza,INJ,MDCK,PF,Quad >6mo(Flucelvax) 12/09/2021    MMR 11/14/2003    OPV, trivalent, live 12/13/1995    Pneumococcal 23 valent 11/18/2017    TD,PF 7+ (Tenivac) 11/14/2003    TDAP (Adacel,Boostrix) 11/11/2014       Recommend checking blood sugars before meals and at bedtime.    If Blood glucose are low more often-> 2-3 times/week- give us a call.  The patient is advised to Make better food choices: reduce carbs, Reduce portion size, weight loss and exercise 3-4 times a week.  Discussed  hypoglycemia signs and symptoms as well as management in detail.      Follow-up:  As noted in AVS    Bridgette Katz MD  [facility]in[facility]   [facility]  CC: Jacey Mon    All questions were answered.  The patient indicates understanding of the above issues and agrees with the plan set forth.     Disclaimer: This note consists of symbols derived from keyboarding, dictation and/or voice recognition software. As a result, there may be errors in the script that have gone undetected. Please consider this when interpreting information found in this chart.

## 2023-10-17 NOTE — NURSING NOTE
Is the patient currently in the state of [place]? YES    Visit mode:VIDEO    If the visit is dropped, the patient can be reconnected by: VIDEO VISIT: Text to cell phone: [phone]    Will anyone else be joining the visit? NO    How would you like to obtain your AVS? MyChart    Are changes needed to the allergy or medication list? NO    Reason for visit:   Chief Complaint   Patient presents with    RECHECK     Type 1 Diabetes       Juany Arora MA

## 2023-10-17 NOTE — PATIENT INSTRUCTIONS
[facility]  Dr Katz, Endocrinology Department    [facility]   [address] [id number]  [place] [address]  Appointment [phone]  Fax: [phone]  [place]: Monday - Thursday      To provide the best diabetic care, please bring your blood glucose meter to each and every visit with your Endocrinologist.  Your blood glucose meter/insulin pump will be downloaded at every appointment.    Please arrive 15 minutes before your scheduled appointment.  This will allow for your blood glucose meter/insulin pump to be downloaded.  If you are wearing DEXCOM please bring  or sharing code so that it can be downloaded.  If you are using freestyle steve personal sensors please bring the reader.  If you are using TANDEM insulin pump please have your username and password to get info from Tandem website.    Continue metformin 1000 mg twice a day  Continue current pump settings.  Try to use control IQ as much as possible.  Follow up with Joslyn GLASS in 3 months with labs prior.  ( She is a new Physician assistant who is at [facility] on Monday and  and will see follow up Diabetes patients)  Repeat labs and follow up with  in 6 months. ( Recommend in person visit)  Please make a lab appointment for blood work and follow up clinic appointment in 1 week after that to discuss results.    Recommend checking blood sugars before meals and at bedtime.    If Blood glucose are low more often-> 2-3 times/week- give us a call.  Make better food choices: reduce carbs, Reduce portion size, weight loss and exercise 3-4 times a week.    What is hypoglycemia:  Hypoglycemia is when blood sugar levels become too low - below 70 m/dl.      What causes hypoglycemia?  - using too much insulin  -taking too many diabetes pills  -not eating enough, or skipping meals or snacks  -not eating enough carbohydrate with meals  -changing your exercise routine  -drinking  alcohol in excess    It is also possible to have hypoglycemia even when you are carefully managing your blood sugar levels.    What does it feel like when blood sugars get too low?  You may feel:  - anxious  -confused  -dizzy  -hungry  -light-headed  -nervous  -shaky  -sleepy  -sweaty    You may have  -blurred or cloudy vision  -heart palpitations (heart skips a beat or races)  -tingling or numbness around the mouth and tongue  -tremors    What to do if you have symptoms of hypoglycmemia:  If you think your blood sugar is too low, check it with a glucose meter.  If its below 70 mg/dl, consume one of the following:  Fruit juice (1/2 cup)  Glucose tablets (15 grams)  Hard candy (5 to 7 pieces)  Honey or sugar (2 teaspoons)  Milk (1/2 cup)  Soft drink (non-diet, 1/2 cup)    Wait 15 minutes and check your blood glucose again.  IF it is still below 70 mg/dl, have another food item listed above. Wait another 15 minutes and repeat the blood glucose test.  Have a small meal or snack that contains some carbohydrate after your blood glucose rises above 70 mg/dl.    If you are at risk of hypoglycemia, always carry with you glucose tablets or one of the foods listed above.      To prevent Hypoglycemia:  Avoid situations that may cause hypoglycemia  Before making any change to your diet or exercise routine, discuss them with your healthcare provider  Keep a record of your blood glucose levels.  Include the time of day, diabetes medications, when you had your last meal or snack, and what you were doing at the time (e.g. Watching TV, gardening, jogging, etc).    Talk to your healthcare provider if your blood glucose levels are often low        Patient guide on hypoglycemia    http://www.hormone.org/Resources/upload/patient-guide-diagnosis-and-management-hypoglycemia-897237.pdf

## 2023-12-06 NOTE — TELEPHONE ENCOUNTER
Pending Prescriptions:                       Disp   Refills    insulin aspart (NOVOLOG VIAL) 100 UNITS/M*40 mL  0            Sig: ADMINISTER UP  UNITS VIA INSULIN PUMP EVERY           DAY AS DIRECTED    Prescription approved per [facility] Refill Protocol.    
07-Dec-2023 00:52

## 2023-12-08 DIAGNOSIS — E10.42 TYPE 1 DIABETES MELLITUS WITH DIABETIC POLYNEUROPATHY (H): ICD-10-CM

## 2023-12-08 RX ORDER — IBUPROFEN 200 MG
TABLET ORAL
Qty: 400 EACH | Refills: 0 | Status: SHIPPED | OUTPATIENT
Start: 2023-12-08

## 2023-12-08 RX ORDER — IBUPROFEN 200 MG
TABLET ORAL
OUTPATIENT
Start: 2023-12-08

## 2023-12-08 NOTE — TELEPHONE ENCOUNTER
"Requested Prescriptions   Pending Prescriptions Disp Refills    insulin syringe-needle U-100 (BD INSULIN SYRINGE) 29G X 1/2\" 1 ML miscellaneous [Pharmacy Med Name: B-D #[id number] SYR/NDL 1ML 29GX1/2 SG]       Sig: USE AS DIRECTED FOR PUMP FAILURE.       Diabetic Supplies Protocol Passed - 12/8/2023  3:17 PM        Passed - Medication is active on med list        Passed - Patient is 18 years of age or older        Passed - Recent (6 mo) or future (30 days) visit within the authorizing provider's specialty     Patient had office visit in the last 6 months or has a visit in the next 30 days with authorizing provider.  See \"Patient Info\" tab in inbasket, or \"Choose Columns\" in Meds & Orders section of the refill encounter.                 "

## 2024-02-17 DIAGNOSIS — I10 BENIGN ESSENTIAL HYPERTENSION: ICD-10-CM

## 2024-02-17 DIAGNOSIS — E10.9 TYPE 1 DIABETES MELLITUS WITHOUT COMPLICATION (H): ICD-10-CM

## 2024-02-19 RX ORDER — LISINOPRIL AND HYDROCHLOROTHIAZIDE 12.5; 2 MG/1; MG/1
1 TABLET ORAL DAILY
Qty: 90 TABLET | Refills: 1 | Status: SHIPPED | OUTPATIENT
Start: 2024-02-19 | End: 2024-03-26

## 2024-02-19 NOTE — PROGRESS NOTES
Refilled 6 months. Pt is scheduled to see endo 4/2024, but please inform pt that if endo is only virtual it would be good to see us or podiatry in person soon for foot exam. Could do labs for endo at same time.

## 2024-02-19 NOTE — TELEPHONE ENCOUNTER
"Last seen 8/15/23 with the following instructions: \"follow up visit: 6 months, could consider 1 year, if pt does in person visit with endocrinologist.\"      Would you like pt to be seen or refill?    Please advise, thanks.      Pending Prescriptions:                       Disp   Refills    lisinopril-hydrochlorothiazide (ZESTORETI*90 tab*1            Sig: TAKE 1 TABLET BY MOUTH DAILY      "

## 2024-03-12 DIAGNOSIS — F41.0 GENERALIZED ANXIETY DISORDER WITH PANIC ATTACKS: ICD-10-CM

## 2024-03-12 DIAGNOSIS — F41.1 GENERALIZED ANXIETY DISORDER WITH PANIC ATTACKS: ICD-10-CM

## 2024-03-13 ENCOUNTER — MYC MEDICAL ADVICE (OUTPATIENT)
Dept: FAMILY MEDICINE | Facility: CLINIC | Age: 34
End: 2024-03-13

## 2024-03-13 DIAGNOSIS — F41.1 GENERALIZED ANXIETY DISORDER WITH PANIC ATTACKS: ICD-10-CM

## 2024-03-13 DIAGNOSIS — F41.0 GENERALIZED ANXIETY DISORDER WITH PANIC ATTACKS: ICD-10-CM

## 2024-03-13 RX ORDER — ESCITALOPRAM OXALATE 5 MG/1
TABLET ORAL
COMMUNITY
Start: 2024-03-13

## 2024-03-13 RX ORDER — ESCITALOPRAM OXALATE 10 MG/1
TABLET ORAL
COMMUNITY
Start: 2024-03-13

## 2024-03-13 NOTE — TELEPHONE ENCOUNTER
Pt is due for 6 month med check (ideally fasting) since he does virtual appointments with endo.      Refused Prescriptions:                       Disp   Refills    escitalopram (LEXAPRO) 10 MG tablet [Pharm*                Sig: TAKE 1 TABLET BY MOUTH ALONG WITH THE 5 MG  Refused By: EDSON LAZO  Reason for Refusal: Patient needs appointment    escitalopram (LEXAPRO) 5 MG tablet [Pharma*                Sig: TAKE 1 TABLET BY MOUTH ALONG WITH THE 10 MG  Refused By: EDSON LAZO  Reason for Refusal: Patient needs appointment

## 2024-03-14 RX ORDER — ESCITALOPRAM OXALATE 5 MG/1
5 TABLET ORAL DAILY
Qty: 10 TABLET | Refills: 0 | Status: SHIPPED | OUTPATIENT
Start: 2024-03-14 | End: 2024-03-26

## 2024-03-14 RX ORDER — ESCITALOPRAM OXALATE 10 MG/1
10 TABLET ORAL DAILY
Qty: 10 TABLET | Refills: 0 | Status: SHIPPED | OUTPATIENT
Start: 2024-03-14 | End: 2024-03-26

## 2024-03-14 NOTE — TELEPHONE ENCOUNTER
Pt scheduled appt for 03/21 needs extension of medication.    Gonzalez Gibbs is requesting a refill of:    Pending Prescriptions:                       Disp   Refills    escitalopram (LEXAPRO) 10 MG tablet       10 tab*0            Sig: Take 1 tablet (10 mg) by mouth daily With the 5mg    escitalopram (LEXAPRO) 5 MG tablet        10 tab*0            Sig: Take 1 tablet (5 mg) by mouth daily With the 10mg

## 2024-03-16 ENCOUNTER — HEALTH MAINTENANCE LETTER (OUTPATIENT)
Age: 34
End: 2024-03-16

## 2024-03-19 PROBLEM — Z71.89 ACP (ADVANCE CARE PLANNING): Status: ACTIVE | Noted: 2017-11-18

## 2024-03-26 ENCOUNTER — OFFICE VISIT (OUTPATIENT)
Dept: FAMILY MEDICINE | Facility: CLINIC | Age: 34
End: 2024-03-26

## 2024-03-26 VITALS
HEART RATE: 72 BPM | WEIGHT: 281 LBS | DIASTOLIC BLOOD PRESSURE: 70 MMHG | OXYGEN SATURATION: 96 % | TEMPERATURE: 97.6 F | HEIGHT: 69 IN | BODY MASS INDEX: 41.62 KG/M2 | SYSTOLIC BLOOD PRESSURE: 128 MMHG

## 2024-03-26 DIAGNOSIS — E10.9 TYPE 1 DIABETES MELLITUS WITHOUT COMPLICATION (H): ICD-10-CM

## 2024-03-26 DIAGNOSIS — E66.01 MORBID OBESITY (H): ICD-10-CM

## 2024-03-26 DIAGNOSIS — I10 BENIGN ESSENTIAL HYPERTENSION: Primary | ICD-10-CM

## 2024-03-26 DIAGNOSIS — F41.1 GENERALIZED ANXIETY DISORDER WITH PANIC ATTACKS: ICD-10-CM

## 2024-03-26 DIAGNOSIS — F41.0 GENERALIZED ANXIETY DISORDER WITH PANIC ATTACKS: ICD-10-CM

## 2024-03-26 LAB
ALBUMIN SERPL-MCNC: 4.4 G/DL (ref 3.6–5.1)
ALBUMIN/GLOB SERPL: 1.7 {RATIO} (ref 1–2.5)
ALP SERPL-CCNC: 45 U/L (ref 33–130)
ALT 1742-6: 37 U/L (ref 0–32)
AST 1920-8: 25 U/L (ref 0–35)
BILIRUB SERPL-MCNC: 0.9 MG/DL (ref 0.2–1.2)
BUN SERPL-MCNC: 12 MG/DL (ref 7–25)
BUN/CREATININE RATIO: 14.3 (ref 6–32)
CALCIUM SERPL-MCNC: 9.4 MG/DL (ref 8.6–10.3)
CHLORIDE SERPLBLD-SCNC: 100.7 MMOL/L (ref 98–110)
CHOLEST SERPL-MCNC: 234 MG/DL (ref 0–199)
CHOLEST/HDLC SERPL: 4 {RATIO} (ref 0–5)
CO2 SERPL-SCNC: 27.3 MMOL/L (ref 20–32)
CREAT SERPL-MCNC: 0.84 MG/DL (ref 0.6–1.3)
GLOBULIN, CALCULATED - QUEST: 2.6 (ref 1.9–3.7)
GLUCOSE SERPL-MCNC: 184 MG/DL (ref 60–99)
HDLC SERPL-MCNC: 63 MG/DL (ref 40–150)
HEMOGLOBIN A1C: 6.9 % (ref 4–5.6)
LDLC SERPL CALC-MCNC: 154 MG/DL (ref 0–130)
POTASSIUM SERPL-SCNC: 4.46 MMOL/L (ref 3.5–5.3)
PROT SERPL-MCNC: 7 G/DL (ref 6.1–8.1)
SODIUM SERPL-SCNC: 136.6 MMOL/L (ref 135–146)
TRIGL SERPL-MCNC: 85 MG/DL (ref 0–149)

## 2024-03-26 PROCEDURE — 36415 COLL VENOUS BLD VENIPUNCTURE: CPT | Performed by: PHYSICIAN ASSISTANT

## 2024-03-26 PROCEDURE — 99214 OFFICE O/P EST MOD 30 MIN: CPT | Performed by: PHYSICIAN ASSISTANT

## 2024-03-26 PROCEDURE — 80053 COMPREHEN METABOLIC PANEL: CPT | Performed by: PHYSICIAN ASSISTANT

## 2024-03-26 PROCEDURE — 80061 LIPID PANEL: CPT | Performed by: PHYSICIAN ASSISTANT

## 2024-03-26 PROCEDURE — 83036 HEMOGLOBIN GLYCOSYLATED A1C: CPT | Performed by: PHYSICIAN ASSISTANT

## 2024-03-26 RX ORDER — LISINOPRIL AND HYDROCHLOROTHIAZIDE 12.5; 2 MG/1; MG/1
1 TABLET ORAL DAILY
Qty: 90 TABLET | Refills: 1 | Status: SHIPPED | OUTPATIENT
Start: 2024-03-26 | End: 2024-09-25

## 2024-03-26 RX ORDER — ESCITALOPRAM OXALATE 10 MG/1
10 TABLET ORAL DAILY
Qty: 90 TABLET | Refills: 1 | Status: SHIPPED | OUTPATIENT
Start: 2024-03-26 | End: 2024-09-20

## 2024-03-26 RX ORDER — ESCITALOPRAM OXALATE 5 MG/1
5 TABLET ORAL DAILY
Qty: 90 TABLET | Refills: 1 | Status: SHIPPED | OUTPATIENT
Start: 2024-03-26 | End: 2024-09-20

## 2024-03-26 ASSESSMENT — PATIENT HEALTH QUESTIONNAIRE - PHQ9
5. POOR APPETITE OR OVEREATING: NOT AT ALL
SUM OF ALL RESPONSES TO PHQ QUESTIONS 1-9: 5

## 2024-03-26 ASSESSMENT — ANXIETY QUESTIONNAIRES
1. FEELING NERVOUS, ANXIOUS, OR ON EDGE: SEVERAL DAYS
3. WORRYING TOO MUCH ABOUT DIFFERENT THINGS: SEVERAL DAYS
7. FEELING AFRAID AS IF SOMETHING AWFUL MIGHT HAPPEN: SEVERAL DAYS
GAD7 TOTAL SCORE: 4
5. BEING SO RESTLESS THAT IT IS HARD TO SIT STILL: NOT AT ALL
GAD7 TOTAL SCORE: 4
IF YOU CHECKED OFF ANY PROBLEMS ON THIS QUESTIONNAIRE, HOW DIFFICULT HAVE THESE PROBLEMS MADE IT FOR YOU TO DO YOUR WORK, TAKE CARE OF THINGS AT HOME, OR GET ALONG WITH OTHER PEOPLE: NOT DIFFICULT AT ALL
6. BECOMING EASILY ANNOYED OR IRRITABLE: SEVERAL DAYS
2. NOT BEING ABLE TO STOP OR CONTROL WORRYING: NOT AT ALL

## 2024-03-26 NOTE — PROGRESS NOTES
"CC: Medication Check    History:  HTN:  Stable on lisinopril-hctz. Has been taking consistently. No side effects. Does check BP and getting 120s-low 130s/70s. Had lost weigh leading up to 8/2023 appt and now overall stable. Recently had eye exam, and we will get records sent from [facility]. Denies any chest pain, palpitations, SOB. Has been staying active walking 8 miles daily.       Anxiety, Depression:  Takes escitalopram 15 mg. Symptoms seem to be controlled. Denies panic attack in 2 years.  Denies any side effects. No SI/HI.     Obesity:  Weight has been stable. 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 Having trouble following healthy diet. Plans to be more active on off days, with nicer weather.      DM1:  Works with endocrinology Dr. Katz, and is due for follow-up. Scheduled for 4/14/2024 which will be in person. Agrees to schedule. Did advise return to podiatry for Charcot foot.     PMH, MEDICATIONS, ALLERGIES, SOCIAL AND FAMILY HISTORY in [facility] and reviewed by me personally.    ROS negative other than the symptoms noted above in the HPI.      Examination   /70 (BP Location: Right arm, Patient Position: Sitting, Cuff Size: Adult Large)   Pulse 72   Temp 97.6  F (36.4  C) (Temporal)   Ht 1.746 m (5' 8.75\")   Wt 127.5 kg (281 lb)   SpO2 96%   BMI 41.80 kg/m      Constitutional: Sitting comfortably, in no acute distress. Vital signs noted  Neck:  no adenopathy, trchea midline and normal to palpation, thyroid normal to palpation  Cardiovascular:  regular rate and rhythm, no murmurs, clicks, or gallops  Respiratory:  normal respiratory rate and rhythm, lungs clear to auscultation  SKIN: No jaundice/pallor/rash.   Psychiatric: mentation appears normal and affect normal/bright      A/P    ICD-10-CM    1. Benign essential hypertension  I10 Comprehensive Metobolic Panel (BFP)      2. Generalized anxiety disorder with panic attacks  F41.1     F41.0     "   3. Type 1 diabetes mellitus without complication (H)  E10.9 HEMOGLOBIN A1C (BFP)     VENOUS COLLECTION     Lipid Panel (BFP)     Comprehensive Metobolic Panel (BFP)      4. Morbid obesity (H)  E66.01           DISCUSSION:  HTN:  BP controlled and at home. Will update fasting labs and send MyChart. Will refill medication without change for 6 months.     Anxiety, Depression:  PHQ-9 and ANTONETTE-7 updated today and well controlled. Agreed to refill current medication without change for 1 year. Contact us sooner with concerns, worsening.    Obesity:  Encouraged healthy diet and exercise with the goal of weight loss.      DM1:  Continue working closely with endocrinology.     follow up visit: 6 months    Verito Molina PA-C  [facility]

## 2024-03-26 NOTE — NURSING NOTE
Chief Complaint   Patient presents with    Recheck Medication     Fasting med check, type 1 diabetic         Pre-visit Screening:  Immunizations:  up to date  Colonoscopy:  NA  Mammogram: NA  Asthma Action Test/Plan:  NA  PHQ9:  Done today  GAD7:  Done today  Questioned patient about current smoking habits Pt. has never smoked.  Ok to leave detailed message on voice mail for today's visit only Yes, phone # [phone]

## 2024-03-28 DIAGNOSIS — E10.9 TYPE 1 DIABETES MELLITUS WITHOUT COMPLICATION (H): ICD-10-CM

## 2024-03-28 RX ORDER — INSULIN LISPRO 100 [IU]/ML
INJECTION, SOLUTION INTRAVENOUS; SUBCUTANEOUS
Qty: 120 ML | Refills: 0 | OUTPATIENT
Start: 2024-03-28

## 2024-03-30 DIAGNOSIS — E10.9 TYPE 1 DIABETES MELLITUS WITHOUT COMPLICATION (H): Primary | ICD-10-CM

## 2024-04-01 RX ORDER — PROCHLORPERAZINE 25 MG/1
SUPPOSITORY RECTAL
Qty: 1 EACH | Refills: 0 | Status: SHIPPED | OUTPATIENT
Start: 2024-04-01 | End: 2024-10-03

## 2024-04-01 NOTE — TELEPHONE ENCOUNTER
"Requested Prescriptions   Pending Prescriptions Disp Refills    Continuous Blood Gluc Transmit (DEXCOM G6 TRANSMITTER) MISC [Pharmacy Med Name: DEXCOM G6 TRANSMITTER]       Sig: CHANGE EVERY 90 DAYS       Diabetic Supplies Protocol Failed - 3/30/2024 11:15 AM        Failed - Medication indicated for associated diagnosis        Passed - Medication is active on med list        Passed - Patient is 18 years of age or older        Passed - Recent (6 mo) or future (30 days) visit within the authorizing provider's specialty     Patient had office visit in the last 6 months or has a visit in the next 30 days with authorizing provider.  See \"Patient Info\" tab in inbasket, or \"Choose Columns\" in Meds & Orders section of the refill encounter.                 "

## 2024-04-02 ENCOUNTER — MYC MEDICAL ADVICE (OUTPATIENT)
Dept: FAMILY MEDICINE | Facility: CLINIC | Age: 34
End: 2024-04-02

## 2024-04-08 NOTE — PROGRESS NOTES
Outcome for 04/08/24 1:35 PM: Data uploaded on Dexcom  Vidya Ventura MA  Outcome for 04/08/24 1:35 PM: Havsjo Delikat[employer]ert message sent  Vidya Ventura MA  Outcome for 04/12/24 1:15 PM: Left Voicemail   Vidya Ventura MA  Outcome for 04/15/24 7:03 AM: Dexcom and Tandem emailed to provider  Ann Brown LPN

## 2024-04-12 ENCOUNTER — TELEPHONE (OUTPATIENT)
Dept: ENDOCRINOLOGY | Facility: CLINIC | Age: 34
End: 2024-04-12

## 2024-04-12 NOTE — TELEPHONE ENCOUNTER
Called patient and left voicemail. Patient has an appointment on  4/16/24 . Need patient to upload their Tandem device to site for provider to review prior to their appointment.  Vidya Ventura MA

## 2024-04-16 ENCOUNTER — VIRTUAL VISIT (OUTPATIENT)
Dept: ENDOCRINOLOGY | Facility: CLINIC | Age: 34
End: 2024-04-16
Payer: COMMERCIAL

## 2024-04-16 VITALS — WEIGHT: 279 LBS | BODY MASS INDEX: 41.5 KG/M2

## 2024-04-16 DIAGNOSIS — E10.9 TYPE 1 DIABETES MELLITUS WITHOUT COMPLICATION (H): ICD-10-CM

## 2024-04-16 DIAGNOSIS — E10.65 UNCONTROLLED TYPE 1 DIABETES MELLITUS WITH HYPERGLYCEMIA (H): ICD-10-CM

## 2024-04-16 DIAGNOSIS — Z96.41 INSULIN PUMP IN PLACE: Primary | ICD-10-CM

## 2024-04-16 PROCEDURE — 99214 OFFICE O/P EST MOD 30 MIN: CPT | Mod: 95 | Performed by: INTERNAL MEDICINE

## 2024-04-16 PROCEDURE — G2211 COMPLEX E/M VISIT ADD ON: HCPCS | Mod: 95 | Performed by: INTERNAL MEDICINE

## 2024-04-16 PROCEDURE — 95251 CONT GLUC MNTR ANALYSIS I&R: CPT | Performed by: INTERNAL MEDICINE

## 2024-04-16 RX ORDER — PROCHLORPERAZINE 25 MG/1
SUPPOSITORY RECTAL
Qty: 9 EACH | Refills: 3 | Status: SHIPPED | OUTPATIENT
Start: 2024-04-16

## 2024-04-16 RX ORDER — INSULIN LISPRO 100 [IU]/ML
INJECTION, SOLUTION INTRAVENOUS; SUBCUTANEOUS
Qty: 120 ML | Refills: 1 | Status: SHIPPED | OUTPATIENT
Start: 2024-04-16

## 2024-04-16 NOTE — NURSING NOTE
Is the patient currently in the state of [place]? YES    Visit mode:VIDEO    If the visit is dropped, the patient can be reconnected by: VIDEO VISIT: Text to cell phone:   Telephone Information:   Mobile [phone]       Will anyone else be joining the visit? NO  (If patient encounters technical issues they should call [phone])    How would you like to obtain your AVS? MyChart    Are changes needed to the allergy or medication list? No    Are refills needed on medications prescribed by this physician? YES    Reason for visit: RECHECK and Video Visit    Verito PETERSEN

## 2024-04-16 NOTE — LETTER
"    4/16/2024         RE: Gonzalez Gibbs  [address]  [place] [address]        Dear Colleague,    Thank you for referring your patient, Gonzalez Gibbs, to the [facility]. Please see a copy of my visit note below.    Outcome for 04/08/24 1:35 PM: Data uploaded on Dexcom  Vidya Ventura MA  Outcome for 04/08/24 1:35 PM: G-CONt message sent  Vidya Ventura MA  Outcome for 04/12/24 1:15 PM: Left Voicemail   Vidya Ventura MA  Outcome for 04/15/24 7:03 AM: Dexcom and Tandem emailed to provider  Ann Brown LPN         THIS IS A VIDEO VISIT:    Phone call visit/virtual visit encounter:    Name of patient: Gonzalez Gibbs    Date of encounter: 4/16/2024    Time of start of video visit: 11:01    Video started: 11:11    Video ended: 11:24    Provider location: working from home/ [facility]    Patient location: patients home.    Mode of transmission: BovControl video/ Livefyre    Verbal consent: obtained before starting visit. Pt is agreeable.      The patient has been notified of following:      \"This VIDEO visit will be conducted via a call between you and your physician/provider. We have found that certain health care needs can be provided without the need for a physical exam.  This service lets us provide the care you need with a short phone conversation.  If a prescription is necessary we can send it directly to your pharmacy.  If lab work is needed we can place an order for that and you can then stop by our lab to have the test done at a later time.     With new updates with corona virus patient might be billed as clinic visit.     If during the course of the call the physician/provider feels a telephone visit is not appropriate, you will not be charged for this service.\"      Past medical history, social history, family history, allergy and medications were reviewed and updated as appropriate.  Reviewed pertinent labs, notes, imaging studies " personally.    Endocrinology Clinic Note:  Name: Gonzalez Gibbs  Seen for Diabetes f/u.   HPI:  Gonzalez Gibbs is a 34 year old male who presents for the evaluation/management of type 1 diabetes.  Was getting care at endocrinology clinic of [place] since last 2016 visit. Records requested.  Was On insulin pump for last 10-12 years.    Now using TANDEM T-slim insulin pump + DEXCOM G6.   Using Humalog  in pump.  Using  insulin pump with Dexcom.    CGM data reviewed.  He works from 1:00 PM-10:00 PM  Typically skips breakfast. Has lunch at 12:00 PM and dinner is at 10:30 PM. Snacks during day at work.    He is working at [employer] and is lifting weights and walking more.  He is afraid of lows and was using temp rate more.  Weight is mostly stable.    Reports that he has long acting insulin in case of pump malfunction at home. Gonzalez also has glucagon kit at home for emergency.    1. Type 1 DM:  Orginally diagnosed at the age of: 13 years   Hospitalization for DKA: None  Current Regimen:Tandem  Insulin pump and metformin 1000 mg BID  BS checks: 3-4 times a day  Average Meter Download: Blood glucose data reviewed personally. See nursing note from this encounter for details.  Exercise: Few times a week  Last A1c: 7.2%  Episodes of hypoglycemia (low blood sugar): Occasional.  Gets symptoms  Fixed meal pattern: Yes  Patient counting carbs: Yes  Using PUMP: Yes.   Current Regimen:   (pump settings reviewed with pt on video)          DM Complications:   Nephropathy: No  Retinopathy: No   Neuropathy: No  Microalbuminuria: No  CAD/PAD: No  Gastroparesis: No  Hypoglycemia unawareness: No     2. Hypertension: Blood Pressure today:   BP Readings from Last 3 Encounters:   03/26/24 128/70   08/15/23 120/78   12/14/22 130/84      Blood pressure medications include lisinopril 10 mg.    3. Hyperlipidemia:  Not on medication.     PMH/PSH:  Past Medical History:   Diagnosis Date     Family history of diabetes mellitus       Mild intermittent asthma     EIA     Mild intermittent asthma     EIA     Type I diabetes mellitus, uncontrolled 6/13/2016     Past Surgical History:   Procedure Laterality Date     HC REPAIR ING HERNIA,6MO-5YR,REDUC  1992    also umbilical hernia     ZZHC CREATE EARDRUM OPENING,GEN ANESTH      P.E. Tubes/ 4-5 surgeries, annual from age 10)     Family Hx:  Family History   Problem Relation Age of Onset     Hypertension Father      Depression Mother      Diabetes Paternal Uncle      Diabetes Other         2nd cousins     Cancer No family hx of      Heart Disease No family hx of            DM2:             Social Hx:  Social History     Socioeconomic History     Marital status: Single     Spouse name: Not on file     Number of children: Not on file     Years of education: Not on file     Highest education level: Not on file   Occupational History     Occupation: [employer]   Tobacco Use     Smoking status: Never     Passive exposure: Never     Smokeless tobacco: Never   Vaping Use     Vaping status: Never Used   Substance and Sexual Activity     Alcohol use: Yes     Alcohol/week: 0.0 standard drinks of alcohol     Drug use: No     Sexual activity: Not Currently   Other Topics Concern      Service Not Asked     Blood Transfusions Not Asked     Caffeine Concern Not Asked     Occupational Exposure Not Asked     Hobby Hazards Not Asked     Sleep Concern Not Asked     Stress Concern Not Asked     Weight Concern Not Asked     Special Diet Not Asked     Back Care Not Asked     Exercise Yes     Bike Helmet Not Asked     Seat Belt Yes     Self-Exams Not Asked     Parent/sibling w/ CABG, MI or angioplasty before 65F 55M? Not Asked   Social History Narrative     Not on file     Social Determinants of Health     Financial Resource Strain: Not on file   Food Insecurity: Not on file   Transportation Needs: Not on file   Physical Activity: Not on file   Stress: Not on file   Social Connections: Not on file   Interpersonal  Safety: Not on file   Housing Stability: Not on file          MEDICATIONS:  has a current medication list which includes the following prescription(s): blood glucose, blood glucose monitoring, dexcom g6 , dexcom g6 sensor, dexcom g6 transmitter, escitalopram, escitalopram, glucagon emergency, insulin lispro, insulin syringe-needle u-100, lisinopril-hydrochlorothiazide, metformin, and order for dme.    ROS     ROS: 10 point ROS neg other than the symptoms noted above in the HPI.    Physical Exam   VS: Wt 126.6 kg (279 lb)   BMI 41.50 kg/m     GENERAL: healthy, alert and no distress  EYES: Eyes grossly normal to inspection, conjunctivae and sclerae normal  ENT: no nose swelling, nasal discharge.  Thyroid: no apparent thyroid nodules  RESP: no audible wheeze, cough, or visible cyanosis.  No visible retractions or increased work of breathing.  Able to speak fully in complete sentences.  ABDO: not evaluated.  EXTREMITIES: no hand tremors.  NEURO: Cranial nerves grossly intact, mentation intact and speech normal  SKIN: No apparent skin lesions, rash or edema seen   PSYCH: mentation appears normal, affect normal/bright, judgement and insight intact, normal speech and appearance well-groomed    LABS:  A1c:  Lab Results   Component Value Date    A1C 6.9 03/26/2024    A1C 7.1 08/15/2023    A1C 6.8 12/14/2022    A1C 7.2 04/25/2022    A1C 7.1 12/01/2021    A1C 7.2 12/15/2020    A1C 7.5 07/23/2020     BMP:  Creatinine   Date Value Ref Range Status   03/26/2024 0.84 0.60 - 1.30 mg/dL Final     Urine Micro:  Lab Results   Component Value Date    UMALCR <30 08/15/2023           LFTs/Lipids:  Recent Labs   Lab Test 08/15/23  1502 12/14/22  0000   CHOL 206* 232*   HDL 60 59    142*   TRIG 84 157*   CHOLHDLRATIO 3 4     TFTs:  TSH   Date Value Ref Range Status   08/15/2023 0.82 0.40 - 4.50 mIU/L Final         Glucometer/ insulin pump (if applicable)/ CGM data (if applicable) downloaded, Personally reviewed and  interpreted.  All Blood sugar data reviewed personally and discussed with pt.  See nursing note from today's clinic visit for details of BG/CGM log.  All pertinent notes, labs, and images personally reviewed by me.     A/P  Mr.Christopher ANTONIO Gibbs is a 34 year old here for the evaluation/management of diabetes:    1. DM1 - Under fair control.  A1c 6.9%.  No complications associated with diabetes.  He is using insulin in tandem pump plus using Dexcom.  No major episodes of hypoglycemia noted/reported.   He is active at work and afraid of lows.  Plan:  Discussed diagnosis, pathophysiology, management and treatment options of condition with pt.  I also discussed importance of strict blood sugar control to prevent complications associated with uncontrolled diabetes.    Continue metformin 1000 mg twice a day  Continue current pump settings.  Try to use control IQ as much as possible.  Labs and follow up in 6 months.  Please make a lab appointment for blood work and follow up clinic appointment in 1 week after that to discuss results.    Recommend checking blood sugars before meals and at bedtime.    If Blood glucose are low more often-> 2-3 times/week- give us a call.  Make better food choices: reduce carbs, Reduce portion size, weight loss and exercise 3-4 times a week.      2. Hypertension - Takes lisinopril 10 mg    3. Hyperlipidemia - Not on medication.  High TG.  .  Recommend strict BG control.  Managed by PCP.   4. Prevention  Opthalmology: Due for eye examination  ASA-no 2/2 to age  Smoking- no      Most Recent Immunizations   Administered Date(s) Administered     COVID-19 Bivalent 12+ (Pfizer) 11/21/2022     COVID-19 MONOVALENT 12+ (Pfizer) 12/09/2021     Flu, Unspecified 10/12/2019     HEPA 11/01/2008     HIB (PRP-T) 05/27/1992     HepB 01/09/2004     Hepatitis A (ADULT 19+) 06/03/2019     Historical DTP/aP 01/01/1996     Influenza (H1N1) 12/08/2009     Influenza (IIV3) PF 10/17/2012     Influenza  Vaccine >6 months,quad, PF 11/21/2022     Influenza,INJ,MDCK,PF,Quad >6mo(Flucelvax) 12/09/2021     MMR 11/14/2003     OPV, trivalent, live 12/13/1995     Pneumococcal 23 valent 11/18/2017     TD,PF 7+ (Tenivac) 11/14/2003     TDAP (Adacel,Boostrix) 11/11/2014       Recommend checking blood sugars before meals and at bedtime.    If Blood glucose are low more often-> 2-3 times/week- give us a call.  The patient is advised to Make better food choices: reduce carbs, Reduce portion size, weight loss and exercise 3-4 times a week.  Discussed hypoglycemia signs and symptoms as well as management in detail.    Discussed indications, risks and benefits of all medications prescribed, and answered questions to patient's satisfaction.  The longitudinal plan of care for the diagnosis(es)/condition(s) as documented were addressed during this visit. Due to the added complexity in care, I will continue to support Enrique in the subsequent management and with ongoing continuity of care.  All questions were answered.  The patient indicates understanding of the above issues and agrees with the plan set forth.      Follow-up:  As noted in AVS    Bridgette Katz MD  Endocrinology   [facility]  CC: Jacey Mon    Disclaimer: This note consists of symbols derived from keyboarding, dictation and/or voice recognition software. As a result, there may be errors in the script that have gone undetected. Please consider this when interpreting information found in this chart.                            Again, thank you for allowing me to participate in the care of your patient.        Sincerely,        Bridgette Katz MD

## 2024-04-16 NOTE — PATIENT INSTRUCTIONS
[facility]  Dr Katz, Endocrinology Department    [facility]   [address]  [place] [address]  Appointment [phone]  Fax: [phone]  [place]: Monday - Thursday      To provide the best diabetic care, please bring your blood glucose meter to each and every visit with your Endocrinologist.  Your blood glucose meter/insulin pump will be downloaded at every appointment.    Please arrive 15 minutes before your scheduled appointment.  This will allow for your blood glucose meter/insulin pump to be downloaded.  If you are wearing DEXCOM please bring  or sharing code so that it can be downloaded.  If you are using freestyle steve personal sensors please bring the reader.  If you are using TANDEM insulin pump please have your username and password to get info from Tandem website.    Continue metformin 1000 mg twice a day  Continue current pump settings.  Try to use control IQ as much as possible.  Labs and follow up in 6 months.  Please make a lab appointment for blood work and follow up clinic appointment in 1 week after that to discuss results.    Recommend checking blood sugars before meals and at bedtime.    If Blood glucose are low more often-> 2-3 times/week- give us a call.  Make better food choices: reduce carbs, Reduce portion size, weight loss and exercise 3-4 times a week.    What is hypoglycemia:  Hypoglycemia is when blood sugar levels become too low - below 70 m/dl.      What causes hypoglycemia?  - using too much insulin  -taking too many diabetes pills  -not eating enough, or skipping meals or snacks  -not eating enough carbohydrate with meals  -changing your exercise routine  -drinking alcohol in excess    It is also possible to have hypoglycemia even when you are carefully managing your blood sugar levels.    What does it feel like when blood sugars get too low?  You may feel:  -  anxious  -confused  -dizzy  -hungry  -light-headed  -nervous  -shaky  -sleepy  -sweaty    You may have  -blurred or cloudy vision  -heart palpitations (heart skips a beat or races)  -tingling or numbness around the mouth and tongue  -tremors    What to do if you have symptoms of hypoglycmemia:  If you think your blood sugar is too low, check it with a glucose meter.  If its below 70 mg/dl, consume one of the following:  Fruit juice (1/2 cup)  Glucose tablets (15 grams)  Hard candy (5 to 7 pieces)  Honey or sugar (2 teaspoons)  Milk (1/2 cup)  Soft drink (non-diet, 1/2 cup)    Wait 15 minutes and check your blood glucose again.  IF it is still below 70 mg/dl, have another food item listed above. Wait another 15 minutes and repeat the blood glucose test.  Have a small meal or snack that contains some carbohydrate after your blood glucose rises above 70 mg/dl.    If you are at risk of hypoglycemia, always carry with you glucose tablets or one of the foods listed above.      To prevent Hypoglycemia:  Avoid situations that may cause hypoglycemia  Before making any change to your diet or exercise routine, discuss them with your healthcare provider  Keep a record of your blood glucose levels.  Include the time of day, diabetes medications, when you had your last meal or snack, and what you were doing at the time (e.g. Watching TV, gardening, jogging, etc).    Talk to your healthcare provider if your blood glucose levels are often low        Patient guide on hypoglycemia    http://[email or web address].org/Resources/upload/patient-guide-diagnosis-and-management-hypoglycemia-[email or web address]

## 2024-04-16 NOTE — PROGRESS NOTES
"THIS IS A VIDEO VISIT:    Phone call visit/virtual visit encounter:    Name of patient: Gonzalez Gibbs    Date of encounter: 4/16/2024    Time of start of video visit: 11:01    Video started: 11:11    Video ended: 11:24    Provider location: working from home/ [facility]    Patient location: patients home.    Mode of transmission: apprupt video/ CausePlay    Verbal consent: obtained before starting visit. Pt is agreeable.      The patient has been notified of following:      \"This VIDEO visit will be conducted via a call between you and your physician/provider. We have found that certain health care needs can be provided without the need for a physical exam.  This service lets us provide the care you need with a short phone conversation.  If a prescription is necessary we can send it directly to your pharmacy.  If lab work is needed we can place an order for that and you can then stop by our lab to have the test done at a later time.     With new updates with corona virus patient might be billed as clinic visit.     If during the course of the call the physician/provider feels a telephone visit is not appropriate, you will not be charged for this service.\"      Past medical history, social history, family history, allergy and medications were reviewed and updated as appropriate.  Reviewed pertinent labs, notes, imaging studies personally.    Endocrinology Clinic Note:  Name: Gonzalez Gibbs  Seen for Diabetes f/u.   HPI:  Gonzalez Gibbs is a 34 year old male who presents for the evaluation/management of type 1 diabetes.  Was getting care at endocrinology clinic of [facility] since last 2016 visit. Records requested.  Was On insulin pump for last 10-12 years.    Now using TANDEM T-slim insulin pump + DEXCOM G6.   Using Humalog  in pump.  Using  insulin pump with Dexcom.    CGM data reviewed.  He works from 1:00 PM-10:00 PM  Typically skips breakfast. Has lunch at 12:00 PM and dinner is at " 10:30 PM. Snacks during day at work.    He is working at [employer] and is lifting weights and walking more.  He is afraid of lows and was using temp rate more.  Weight is mostly stable.    Reports that he has long acting insulin in case of pump malfunction at home. Gonzalez also has glucagon kit at home for emergency.    1. Type 1 DM:  Orginally diagnosed at the age of: 13 years   Hospitalization for DKA: None  Current Regimen:Tandem  Insulin pump and metformin 1000 mg BID  BS checks: 3-4 times a day  Average Meter Download: Blood glucose data reviewed personally. See nursing note from this encounter for details.  Exercise: Few times a week  Last A1c: 7.2%  Episodes of hypoglycemia (low blood sugar): Occasional.  Gets symptoms  Fixed meal pattern: Yes  Patient counting carbs: Yes  Using PUMP: Yes.   Current Regimen:   (pump settings reviewed with pt on video)          DM Complications:   Nephropathy: No  Retinopathy: No   Neuropathy: No  Microalbuminuria: No  CAD/PAD: No  Gastroparesis: No  Hypoglycemia unawareness: No     2. Hypertension: Blood Pressure today:   BP Readings from Last 3 Encounters:   03/26/24 128/70   08/15/23 120/78   12/14/22 130/84      Blood pressure medications include lisinopril 10 mg.    3. Hyperlipidemia:  Not on medication.     PMH/PSH:  Past Medical History:   Diagnosis Date    Family history of diabetes mellitus     Mild intermittent asthma     EIA    Mild intermittent asthma     EIA    Type I diabetes mellitus, uncontrolled 6/13/2016     Past Surgical History:   Procedure Laterality Date    HC REPAIR ING HERNIA,6MO-5YR,REDUC  1992    also umbilical hernia    ZZHC CREATE EARDRUM OPENING,GEN ANESTH      P.E. Tubes/ 4-5 surgeries, annual from age 10)     Family Hx:  Family History   Problem Relation Age of Onset    Hypertension Father     Depression Mother     Diabetes Paternal Uncle     Diabetes Other         2nd cousins    Cancer No family hx of     Heart Disease No family hx of             DM2:             Social Hx:  Social History     Socioeconomic History    Marital status: Single     Spouse name: Not on file    Number of children: Not on file    Years of education: Not on file    Highest education level: Not on file   Occupational History    Occupation: [employer]   Tobacco Use    Smoking status: Never     Passive exposure: Never    Smokeless tobacco: Never   Vaping Use    Vaping status: Never Used   Substance and Sexual Activity    Alcohol use: Yes     Alcohol/week: 0.0 standard drinks of alcohol    Drug use: No    Sexual activity: Not Currently   Other Topics Concern     Service Not Asked    Blood Transfusions Not Asked    Caffeine Concern Not Asked    Occupational Exposure Not Asked    Hobby Hazards Not Asked    Sleep Concern Not Asked    Stress Concern Not Asked    Weight Concern Not Asked    Special Diet Not Asked    Back Care Not Asked    Exercise Yes    Bike Helmet Not Asked    Seat Belt Yes    Self-Exams Not Asked    Parent/sibling w/ CABG, MI or angioplasty before 65F 55M? Not Asked   Social History Narrative    Not on file     Social Determinants of Health     Financial Resource Strain: Not on file   Food Insecurity: Not on file   Transportation Needs: Not on file   Physical Activity: Not on file   Stress: Not on file   Social Connections: Not on file   Interpersonal Safety: Not on file   Housing Stability: Not on file          MEDICATIONS:  has a current medication list which includes the following prescription(s): blood glucose, blood glucose monitoring, dexcom g6 , dexcom g6 sensor, dexcom g6 transmitter, escitalopram, escitalopram, glucagon emergency, insulin lispro, insulin syringe-needle u-100, lisinopril-hydrochlorothiazide, metformin, and order for dme.    ROS     ROS: 10 point ROS neg other than the symptoms noted above in the HPI.    Physical Exam   VS: Wt 126.6 kg (279 lb)   BMI 41.50 kg/m     GENERAL: healthy, alert and no distress  EYES: Eyes grossly normal  to inspection, conjunctivae and sclerae normal  ENT: no nose swelling, nasal discharge.  Thyroid: no apparent thyroid nodules  RESP: no audible wheeze, cough, or visible cyanosis.  No visible retractions or increased work of breathing.  Able to speak fully in complete sentences.  ABDO: not evaluated.  EXTREMITIES: no hand tremors.  NEURO: Cranial nerves grossly intact, mentation intact and speech normal  SKIN: No apparent skin lesions, rash or edema seen   PSYCH: mentation appears normal, affect normal/bright, judgement and insight intact, normal speech and appearance well-groomed    LABS:  A1c:  Lab Results   Component Value Date    A1C 6.9 03/26/2024    A1C 7.1 08/15/2023    A1C 6.8 12/14/2022    A1C 7.2 04/25/2022    A1C 7.1 12/01/2021    A1C 7.2 12/15/2020    A1C 7.5 07/23/2020     BMP:  Creatinine   Date Value Ref Range Status   03/26/2024 0.84 0.60 - 1.30 mg/dL Final     Urine Micro:  Lab Results   Component Value Date    UMALCR <30 08/15/2023           LFTs/Lipids:  Recent Labs   Lab Test 08/15/23  1502 12/14/22  0000   CHOL 206* 232*   HDL 60 59    142*   TRIG 84 157*   CHOLHDLRATIO 3 4     TFTs:  TSH   Date Value Ref Range Status   08/15/2023 0.82 0.40 - 4.50 mIU/L Final         Glucometer/ insulin pump (if applicable)/ CGM data (if applicable) downloaded, Personally reviewed and interpreted.  All Blood sugar data reviewed personally and discussed with pt.  See nursing note from today's clinic visit for details of BG/CGM log.  All pertinent notes, labs, and images personally reviewed by me.     A/P  Mr.Christopher ANTONIO Gibbs is a 34 year old here for the evaluation/management of diabetes:    1. DM1 - Under fair control.  A1c 6.9%.  No complications associated with diabetes.  He is using insulin in tandem pump plus using Dexcom.  No major episodes of hypoglycemia noted/reported.   He is active at work and afraid of lows.  Plan:  Discussed diagnosis, pathophysiology, management and treatment options of  condition with pt.  I also discussed importance of strict blood sugar control to prevent complications associated with uncontrolled diabetes.    Continue metformin 1000 mg twice a day  Continue current pump settings.  Try to use control IQ as much as possible.  Labs and follow up in 6 months.  Please make a lab appointment for blood work and follow up clinic appointment in 1 week after that to discuss results.    Recommend checking blood sugars before meals and at bedtime.    If Blood glucose are low more often-> 2-3 times/week- give us a call.  Make better food choices: reduce carbs, Reduce portion size, weight loss and exercise 3-4 times a week.      2. Hypertension - Takes lisinopril 10 mg    3. Hyperlipidemia - Not on medication.  High TG.  .  Recommend strict BG control.  Managed by PCP.   4. Prevention  Opthalmology: Due for eye examination  ASA-no 2/2 to age  Smoking- no      Most Recent Immunizations   Administered Date(s) Administered    COVID-19 Bivalent 12+ (Pfizer) 11/21/2022    COVID-19 MONOVALENT 12+ (Pfizer) 12/09/2021    Flu, Unspecified 10/12/2019    HEPA 11/01/2008    HIB (PRP-T) 05/27/1992    HepB 01/09/2004    Hepatitis A (ADULT 19+) 06/03/2019    Historical DTP/aP 01/01/1996    Influenza (H1N1) 12/08/2009    Influenza (IIV3) PF 10/17/2012    Influenza Vaccine >6 months,quad, PF 11/21/2022    Influenza,INJ,MDCK,PF,Quad >6mo(Flucelvax) 12/09/2021    MMR 11/14/2003    OPV, trivalent, live 12/13/1995    Pneumococcal 23 valent 11/18/2017    TD,PF 7+ (Tenivac) 11/14/2003    TDAP (Adacel,Boostrix) 11/11/2014       Recommend checking blood sugars before meals and at bedtime.    If Blood glucose are low more often-> 2-3 times/week- give us a call.  The patient is advised to Make better food choices: reduce carbs, Reduce portion size, weight loss and exercise 3-4 times a week.  Discussed hypoglycemia signs and symptoms as well as management in detail.    Discussed indications, risks and benefits of  all medications prescribed, and answered questions to patient's satisfaction.  The longitudinal plan of care for the diagnosis(es)/condition(s) as documented were addressed during this visit. Due to the added complexity in care, I will continue to support Enrique in the subsequent management and with ongoing continuity of care.  All questions were answered.  The patient indicates understanding of the above issues and agrees with the plan set forth.      Follow-up:  As noted in AVS    Bridgette Katz MD  Endocrinology   [facility]  CC: Jacey Mon    Disclaimer: This note consists of symbols derived from keyboarding, dictation and/or voice recognition software. As a result, there may be errors in the script that have gone undetected. Please consider this when interpreting information found in this chart.

## 2024-05-25 ENCOUNTER — HEALTH MAINTENANCE LETTER (OUTPATIENT)
Age: 34
End: 2024-05-25

## 2024-08-08 ENCOUNTER — TELEPHONE (OUTPATIENT)
Dept: ENDOCRINOLOGY | Facility: CLINIC | Age: 34
End: 2024-08-08

## 2024-08-09 NOTE — TELEPHONE ENCOUNTER
Retail Pharmacy Prior Authorization Team   Phone: [phone]    PA Initiation    Medication: DEXCOM G6 SENSOR MISC  Insurance Company: [employer] - Phone [phone] Fax [phone]  Pharmacy Filling the Rx: [id number] [place], [place] - [address] AT [facility] [address]  Filling Pharmacy Phone: [phone]  Filling Pharmacy Fax:    Start Date: 8/9/2024      Note: Due to record-high volumes, our turn-around time is taking longer than usual . We are currently 6 days behind in the pools.   We are working diligently to submit all requests in a timely manner and in the order they are received. Please only flag TRUE URGENT requests as high priority to the pool at this time.   If you have questions on status of PA's,  please send a note/message in the active PA encounter and send back to the [facility] PA pool [[id number]].    If you have questions about the turn-around time or about our process, please reach out to our supervisor Juany Stevens.   Thank you!   RPPA (Retail Pharmacy Prior Authorization) team

## 2024-08-13 NOTE — TELEPHONE ENCOUNTER
Retail Pharmacy Prior Authorization Team   Phone: [phone]    Additional question set requested and completed via CMM:        Note: Due to record-high volumes, our turn-around time is taking longer than usual . We are currently 6 days behind in the pools.   We are working diligently to submit all requests in a timely manner and in the order they are received. Please only flag TRUE URGENT requests as high priority to the pool at this time.   If you have questions on status of PA's,  please send a note/message in the active PA encounter and send back to the [facility] PA pool [[phone]].    If you have questions about the turn-around time or about our process, please reach out to our supervisor Juany Stevens.   Thank you!   RPPA (Retail Pharmacy Prior Authorization) team

## 2024-08-13 NOTE — TELEPHONE ENCOUNTER
Retail Pharmacy Prior Authorization Team   Phone: [phone]    Prior Authorization Approval    Medication: DEXCOM G6 SENSOR MISC  Authorization Effective Date: 7/14/2024  Authorization Expiration Date: 8/13/2025  Insurance Company: [employer] - Phone [phone] Fax [phone]  Which Pharmacy is filling the prescription: [id number] [address] AT [address]  Pharmacy Notified: YES  Patient Notified: YES **Instructed pharmacy to notify patient when script is ready to /ship.**

## 2024-09-19 ENCOUNTER — MYC MEDICAL ADVICE (OUTPATIENT)
Dept: FAMILY MEDICINE | Facility: CLINIC | Age: 34
End: 2024-09-19

## 2024-09-19 DIAGNOSIS — F41.0 GENERALIZED ANXIETY DISORDER WITH PANIC ATTACKS: ICD-10-CM

## 2024-09-19 DIAGNOSIS — F41.1 GENERALIZED ANXIETY DISORDER WITH PANIC ATTACKS: ICD-10-CM

## 2024-09-19 RX ORDER — ESCITALOPRAM OXALATE 5 MG/1
TABLET ORAL
COMMUNITY
Start: 2024-09-19

## 2024-09-19 RX ORDER — ESCITALOPRAM OXALATE 10 MG/1
TABLET ORAL
COMMUNITY
Start: 2024-09-19

## 2024-09-19 NOTE — TELEPHONE ENCOUNTER
Gonzalez Gibbs is requesting a refill of:    Refused Prescriptions:                       Disp   Refills    escitalopram (LEXAPRO) 10 MG tablet [Pharm*                Sig: TAKE 1 TABLET BY MOUTH DAILY WITH 5 MG TABLET  Refused By: PAUL KINGSLEY  Reason for Refusal: Patient needs appointment    escitalopram (LEXAPRO) 5 MG tablet [Pharma*                Sig: TAKE 1 TABLET BY MOUTH DAILY WITH 10 MG TABLET  Refused By: PAUL KINGSLEY  Reason for Refusal: Patient needs appointment

## 2024-09-20 RX ORDER — ESCITALOPRAM OXALATE 10 MG/1
10 TABLET ORAL DAILY
Qty: 7 TABLET | Refills: 0 | Status: SHIPPED | OUTPATIENT
Start: 2024-09-20 | End: 2024-09-25

## 2024-09-20 RX ORDER — ESCITALOPRAM OXALATE 5 MG/1
5 TABLET ORAL DAILY
Qty: 7 TABLET | Refills: 0 | Status: SHIPPED | OUTPATIENT
Start: 2024-09-20 | End: 2024-09-25

## 2024-09-25 ENCOUNTER — OFFICE VISIT (OUTPATIENT)
Dept: FAMILY MEDICINE | Facility: CLINIC | Age: 34
End: 2024-09-25

## 2024-09-25 VITALS
HEART RATE: 71 BPM | TEMPERATURE: 97.8 F | DIASTOLIC BLOOD PRESSURE: 72 MMHG | SYSTOLIC BLOOD PRESSURE: 128 MMHG | OXYGEN SATURATION: 96 % | WEIGHT: 279 LBS | BODY MASS INDEX: 41.5 KG/M2

## 2024-09-25 DIAGNOSIS — E66.01 MORBID OBESITY (H): ICD-10-CM

## 2024-09-25 DIAGNOSIS — F41.0 GENERALIZED ANXIETY DISORDER WITH PANIC ATTACKS: ICD-10-CM

## 2024-09-25 DIAGNOSIS — I10 BENIGN ESSENTIAL HYPERTENSION: Primary | ICD-10-CM

## 2024-09-25 DIAGNOSIS — F41.1 GENERALIZED ANXIETY DISORDER WITH PANIC ATTACKS: ICD-10-CM

## 2024-09-25 DIAGNOSIS — E10.9 TYPE 1 DIABETES MELLITUS WITHOUT COMPLICATION (H): ICD-10-CM

## 2024-09-25 LAB
ALBUMIN (URINE) MG/L: 10
ALBUMIN SERPL-MCNC: 4.3 G/DL (ref 3.6–5.1)
ALBUMIN URINE MG/G CR: <30 MG/G CREATININE
ALP SERPL-CCNC: 50 U/L (ref 33–130)
ALT 1742-6: 25 U/L (ref 0–32)
AST 1920-8: 16 U/L (ref 0–35)
BILIRUB SERPL-MCNC: 0.4 MG/DL (ref 0.2–1.2)
BUN SERPL-MCNC: 13 MG/DL (ref 7–25)
BUN/CREATININE RATIO: 15 (ref 6–32)
CALCIUM SERPL-MCNC: 9.3 MG/DL (ref 8.6–10.3)
CHLORIDE SERPLBLD-SCNC: 100.8 MMOL/L (ref 98–110)
CHOLEST SERPL-MCNC: 211 MG/DL (ref 0–199)
CHOLEST/HDLC SERPL: 4 {RATIO} (ref 0–5)
CO2 SERPL-SCNC: 29 MMOL/L (ref 20–32)
CREAT SERPL-MCNC: 0.87 MG/DL (ref 0.6–1.3)
CREATININE URINE MG/DL: 50 MG/DL
GLUCOSE SERPL-MCNC: 262 MG/DL (ref 60–99)
HDLC SERPL-MCNC: 60 MG/DL (ref 40–150)
HEMOGLOBIN A1C: 8 % (ref 4–5.6)
LDLC SERPL CALC-MCNC: 128 MG/DL
POTASSIUM SERPL-SCNC: 4.47 MMOL/L (ref 3.5–5.3)
PROT SERPL-MCNC: 6.5 G/DL (ref 6.1–8.1)
SODIUM SERPL-SCNC: 135.3 MMOL/L (ref 135–146)
TRIGL SERPL-MCNC: 116 MG/DL (ref 0–149)

## 2024-09-25 PROCEDURE — 82570 ASSAY OF URINE CREATININE: CPT | Performed by: PHYSICIAN ASSISTANT

## 2024-09-25 PROCEDURE — 90656 IIV3 VACC NO PRSV 0.5 ML IM: CPT | Performed by: PHYSICIAN ASSISTANT

## 2024-09-25 PROCEDURE — 90471 IMMUNIZATION ADMIN: CPT | Performed by: PHYSICIAN ASSISTANT

## 2024-09-25 PROCEDURE — 83036 HEMOGLOBIN GLYCOSYLATED A1C: CPT | Performed by: PHYSICIAN ASSISTANT

## 2024-09-25 PROCEDURE — 82043 UR ALBUMIN QUANTITATIVE: CPT | Performed by: PHYSICIAN ASSISTANT

## 2024-09-25 PROCEDURE — 99213 OFFICE O/P EST LOW 20 MIN: CPT | Mod: 25 | Performed by: PHYSICIAN ASSISTANT

## 2024-09-25 PROCEDURE — 80053 COMPREHEN METABOLIC PANEL: CPT | Performed by: PHYSICIAN ASSISTANT

## 2024-09-25 PROCEDURE — 36415 COLL VENOUS BLD VENIPUNCTURE: CPT | Performed by: PHYSICIAN ASSISTANT

## 2024-09-25 PROCEDURE — 80061 LIPID PANEL: CPT | Performed by: PHYSICIAN ASSISTANT

## 2024-09-25 RX ORDER — LISINOPRIL AND HYDROCHLOROTHIAZIDE 12.5; 2 MG/1; MG/1
1 TABLET ORAL DAILY
Qty: 90 TABLET | Refills: 1 | Status: SHIPPED | OUTPATIENT
Start: 2024-09-25

## 2024-09-25 RX ORDER — ESCITALOPRAM OXALATE 10 MG/1
10 TABLET ORAL DAILY
Qty: 90 TABLET | Refills: 1 | Status: SHIPPED | OUTPATIENT
Start: 2024-09-25

## 2024-09-25 RX ORDER — ESCITALOPRAM OXALATE 5 MG/1
5 TABLET ORAL DAILY
Qty: 90 TABLET | Refills: 1 | Status: SHIPPED | OUTPATIENT
Start: 2024-09-25

## 2024-09-25 ASSESSMENT — ANXIETY QUESTIONNAIRES
3. WORRYING TOO MUCH ABOUT DIFFERENT THINGS: NOT AT ALL
2. NOT BEING ABLE TO STOP OR CONTROL WORRYING: NOT AT ALL
6. BECOMING EASILY ANNOYED OR IRRITABLE: NOT AT ALL
7. FEELING AFRAID AS IF SOMETHING AWFUL MIGHT HAPPEN: NOT AT ALL
IF YOU CHECKED OFF ANY PROBLEMS ON THIS QUESTIONNAIRE, HOW DIFFICULT HAVE THESE PROBLEMS MADE IT FOR YOU TO DO YOUR WORK, TAKE CARE OF THINGS AT HOME, OR GET ALONG WITH OTHER PEOPLE: NOT DIFFICULT AT ALL
GAD7 TOTAL SCORE: 1
GAD7 TOTAL SCORE: 1
5. BEING SO RESTLESS THAT IT IS HARD TO SIT STILL: NOT AT ALL
1. FEELING NERVOUS, ANXIOUS, OR ON EDGE: SEVERAL DAYS

## 2024-09-25 ASSESSMENT — PATIENT HEALTH QUESTIONNAIRE - PHQ9
5. POOR APPETITE OR OVEREATING: NOT AT ALL
SUM OF ALL RESPONSES TO PHQ QUESTIONS 1-9: 2

## 2024-09-25 NOTE — PROGRESS NOTES
CC: Medication Check    History:  HTN:  Takes lisinopril-hctz. Has been taking consistently. Denies any side effects. Does check BP at home and getting 120s/mid 70s-low 80s. Weight has been stable. Denies any chest pain, palpitations, SOB. Has been staying active. Has had eye exam in the past 1 year- March 2024.      Anxiety, Depression:  Stable on escitalopram 15 mg. Feels like it's working well to control symptoms. No SI/HI. No side effects. Hasn't had any panic attacks for years.     Obesity:  Weight has been stable. Now working new job as  - walking 15 miles/day, but not as much lifting as previous job. Having trouble following healthy diet.      DM1:  Managed by endocrinologist Dr. Katz but visits are often virtual. Next visit scheduled for 10/21/2024. Has been referred to podiatry for diabetic foot care and Charcot foot.     PMH, MEDICATIONS, ALLERGIES, SOCIAL AND FAMILY HISTORY in [facility] and reviewed by me personally.    ROS negative other than the symptoms noted above in the HPI.     Examination   /72 (BP Location: Right arm, Patient Position: Sitting, Cuff Size: Adult Large)   Pulse 71   Temp 97.8  F (36.6  C) (Temporal)   Wt 126.6 kg (279 lb)   SpO2 96%   BMI 41.50 kg/m         Constitutional: Sitting comfortably, in no acute distress. Vital signs noted  Neck:  no adenopathy, trachea midline and normal to palpation, thyroid normal to palpation  Cardiovascular:  regular rate and rhythm, no murmurs, clicks, or gallops  Respiratory:  normal respiratory rate and rhythm, lungs clear to auscultation  SKIN: No jaundice/pallor/rash.   Psychiatric: mentation appears normal and affect normal/bright  Diabetic foot exam: No ulcer or callous formation. Monofilament test diminished other than in heels.           A/P    ICD-10-CM    1. Benign essential hypertension  I10 lisinopril-hydrochlorothiazide (ZESTORETIC) 20-12.5 MG tablet     VENOUS COLLECTION     Comprehensive Metobolic Panel (BFP)       2. Generalized anxiety disorder with panic attacks  F41.1 escitalopram (LEXAPRO) 10 MG tablet    F41.0 escitalopram (LEXAPRO) 5 MG tablet      3. Type 1 diabetes mellitus without complication (H)  E10.9 lisinopril-hydrochlorothiazide (ZESTORETIC) 20-12.5 MG tablet     VENOUS COLLECTION     Comprehensive Metobolic Panel (BFP)     ALBUMIN RANDOM URINE QUANTITATIVE (BFP)     Lipid Panel (BFP)     HEMOGLOBIN A1C (BFP)     VA FOOT EXAM NO CHARGE      4. Morbid obesity (H)  E66.01           DISCUSSION:  HTN:  BP controlled today. Will refill medication without change for 6 months.     Anxiety, Depression:  PHQ-9 and ANTONETTE-7 updated today and well controlled. Agreed to refill current medication without change for 6 months. Contact us sooner with concerns, worsening.     Obesity:  Encouraged healthy diet and exercise with the goal of weight loss.     DM1:  Will update fasting labs, A1c, microalbuminuria and send MyChart. Updated foot exam today. Encouraged establish/follow-up with podiatry. Follow-up with endocrinology as planned.     follow up visit: 6 months    Verito Molina PA-C  [facility]

## 2024-09-25 NOTE — NURSING NOTE
Chief Complaint   Patient presents with    Recheck Medication     Fasting today, refill medications     Pre-visit Screening:  Immunizations:  up to date  Colonoscopy:  NA  Mammogram: NA  Asthma Action Test/Plan:  nA  PHQ9:  Done today  GAD7:  Done today  Questioned patient about current smoking habits Pt. has never smoked.  Ok to leave detailed message on voice mail for today's visit only Yes, phone # [phone]

## 2024-10-17 ENCOUNTER — TELEPHONE (OUTPATIENT)
Dept: ENDOCRINOLOGY | Facility: CLINIC | Age: 34
End: 2024-10-17
Payer: COMMERCIAL

## 2024-10-17 NOTE — TELEPHONE ENCOUNTER
Called patient and left voicemail. Patient has an appointment on  10/21/24 . Need patient to upload their Tandem device to site for provider to review prior to their appointment.  Bobbi Grossaint, VF

## 2024-11-20 ENCOUNTER — OFFICE VISIT (OUTPATIENT)
Dept: FAMILY MEDICINE | Facility: CLINIC | Age: 34
End: 2024-11-20

## 2024-11-20 VITALS
OXYGEN SATURATION: 97 % | TEMPERATURE: 97.2 F | HEART RATE: 84 BPM | BODY MASS INDEX: 39.39 KG/M2 | WEIGHT: 264.8 LBS | SYSTOLIC BLOOD PRESSURE: 140 MMHG | DIASTOLIC BLOOD PRESSURE: 88 MMHG

## 2024-11-20 DIAGNOSIS — F41.0 GENERALIZED ANXIETY DISORDER WITH PANIC ATTACKS: Primary | ICD-10-CM

## 2024-11-20 DIAGNOSIS — F41.1 GENERALIZED ANXIETY DISORDER WITH PANIC ATTACKS: Primary | ICD-10-CM

## 2024-11-20 ASSESSMENT — ANXIETY QUESTIONNAIRES
IF YOU CHECKED OFF ANY PROBLEMS ON THIS QUESTIONNAIRE, HOW DIFFICULT HAVE THESE PROBLEMS MADE IT FOR YOU TO DO YOUR WORK, TAKE CARE OF THINGS AT HOME, OR GET ALONG WITH OTHER PEOPLE: SOMEWHAT DIFFICULT
GAD7 TOTAL SCORE: 9
5. BEING SO RESTLESS THAT IT IS HARD TO SIT STILL: NOT AT ALL
GAD7 TOTAL SCORE: 9
3. WORRYING TOO MUCH ABOUT DIFFERENT THINGS: MORE THAN HALF THE DAYS
7. FEELING AFRAID AS IF SOMETHING AWFUL MIGHT HAPPEN: NOT AT ALL
6. BECOMING EASILY ANNOYED OR IRRITABLE: MORE THAN HALF THE DAYS
2. NOT BEING ABLE TO STOP OR CONTROL WORRYING: SEVERAL DAYS
1. FEELING NERVOUS, ANXIOUS, OR ON EDGE: MORE THAN HALF THE DAYS

## 2024-11-20 ASSESSMENT — PATIENT HEALTH QUESTIONNAIRE - PHQ9
5. POOR APPETITE OR OVEREATING: MORE THAN HALF THE DAYS
SUM OF ALL RESPONSES TO PHQ QUESTIONS 1-9: 5

## 2024-11-20 NOTE — NURSING NOTE
Chief Complaint   Patient presents with    Forms     FMLA paperwork for reduced work schedule 5 days/week 50 hour restriction     Consult     Mental health has been tanking due to long working hours plus seasonal change     Pre-visit Screening:  Immunizations:  not up to date - will do tdap   Colonoscopy:    Mammogram:   Asthma Action Test/Plan:    PHQ9:  given  GAD7:  given   Questioned patient about current smoking habits Pt. has never smoked.  Ok to leave detailed message on voice mail for today's visit only yes, phone # [phone] (home)

## 2024-11-20 NOTE — PROGRESS NOTES
CC: Anxiety, FMLA    History:  Anxiety, depression:  Pt has been stable on escitalopram 15 mg daily. Last discussed 9/25/2024, where pt felt it was working well, so I refilled without change. Since that time, his job has become more demanding. Works as a . Was hired at 5 days per week, and was told the frequency could increase to 6 days per week, but would do their best to keep to 5 days. Shifts start at 7 AM, working in station for 2 hours, and then remainder of day is out driving and walking. Recently he has been getting more 55-60 hour weeks, where he is working 5-6 days per week.  Anxiety has become more active, mood has been lower, feeling more down, feelings of failure. Currently working on setting up therapy. Having more physical symptoms of nausea, headaches.     PMH, MEDICATIONS, ALLERGIES, SOCIAL AND FAMILY HISTORY in [facility] and reviewed by me personally.    ROS negative other than the symptoms noted above in the HPI.      Examination   BP (!) 140/88 (BP Location: Right arm, Patient Position: Sitting, Cuff Size: Adult Large)   Pulse 84   Temp 97.2  F (36.2  C) (Temporal)   Wt 120.1 kg (264 lb 12.8 oz)   SpO2 97%   BMI 39.39 kg/m       Constitutional: Sitting comfortably, in no acute distress. Vital signs noted  SKIN: No jaundice/pallor/rash.   Psychiatric: mentation appears normal and affect normal/bright    A/P    ICD-10-CM    1. Generalized anxiety disorder with panic attacks  F41.1     F41.0           DISCUSSION:  Anxiety:  Spent time discussing worsening anxiety, including physical symptoms in the setting of work demands. Agreed it would be beneficial to set limitations with his work to prevent further worsening. Reviewed FMLA paperwork with pt and am advising limiting to 10 hour days, no more than 50 hours per week. I also wrote a letter stating this that pt plans to give to his supervisor that was requested. Will scan copy of FMLA into chart.     Placed these limitations for 6 months.  Pt is due for follow-up appt this spring, but please return sooner if further worsening.     follow up visit: As needed    Verito Molina PA-C  [facility]

## 2024-11-20 NOTE — LETTER
[facility]  [address], [address]  [place]  [address]    2024        Gonzalez Gibbs  [address]  [facility] [address]  : 1990              To Whom It May Concern:    Gonzalez is a patient at our clinic seen 2024. Due to a medical condition, we completed [facility] paperwork requesting his work hours to be limited to 10 hours/day, up to 50 hours/week effective immediately starting 2024 and continuing to 2025 at which we point we can reevaluate if needed.     If you have any further questions or problems, please contact our office at [phone].          Verito Molina PA-C

## 2024-12-08 ENCOUNTER — TELEPHONE (OUTPATIENT)
Dept: ENDOCRINOLOGY | Facility: CLINIC | Age: 34
End: 2024-12-08

## 2024-12-08 DIAGNOSIS — E10.9 TYPE 1 DIABETES MELLITUS WITHOUT COMPLICATION (H): ICD-10-CM

## 2024-12-09 RX ORDER — INSULIN LISPRO 100 [IU]/ML
INJECTION, SOLUTION INTRAVENOUS; SUBCUTANEOUS
Qty: 140 ML | Refills: 0 | Status: SHIPPED | OUTPATIENT
Start: 2024-12-09

## 2024-12-09 NOTE — TELEPHONE ENCOUNTER
Please advise if okay to bridge to follow up.    Last OV 4/16/24  Next OV 4/8/25    Requested Prescriptions   Pending Prescriptions Disp Refills    insulin lispro (HUMALOG VIAL) 100 UNIT/ML vial [Pharmacy Med Name: INSULIN LISPRO 100U/ML VIAL 10ML] 80 mL 0     Sig: INJECT UP  UNITS DAILY VIA PUMP.       Insulin Protocol Failed - 12/9/2024  9:43 AM        Failed - Recent (6 mo) or future (90 days) visit within the authorizing provider's specialty     The patient must have completed an in-person or virtual visit within the past 6 months or has a future visit scheduled within the next 90 days with the authorizing provider s specialty.  Urgent care and e-visits do not quality as an office visit for this protocol.          Failed - Chart review required     Review Chart.    Do not approve if insulin is used in a pump.  Instead, direct refill request to the patient's endocrinologist.  If the patient doesn't have an endocrinologist, then send the refill to the patient's PCP for review            Passed - HgbA1C in past 3 or 6 months     If HgbA1C is 8 or greater, it needs to be on file within the past 3 months.  If less than 8, must be on file within the past 6 months.     Recent Labs   Lab Test 09/25/24  0000   A1C 8.0*             Passed - Medication is active on med list        Passed - Medication indicated for associated diagnosis     Medication is associated with one or more of the following diagnoses:   - Type 1 diabetes mellitus  - Type 2 diabetes mellitus  - Diabetic nephropathy; Prophylaxis  - Neuropathy due to diabetes mellitus; Prophylaxis  - Retinopathy due to diabetes mellitus; Prophylaxis  - Diabetes mellitus associated with cystic fibrosis  - Disorder of cardiovascular system; Prophylaxis - Type 1 diabetes mellitus   - Disorder of cardiovascular system; Prophylaxis - Type 2 diabetes mellitus            Passed - Patient is 18 years of age or older

## 2024-12-09 NOTE — TELEPHONE ENCOUNTER
Okay to bridge to follow up.   Will need labs prior to visit as well.    Lab Results   Component Value Date    A1C 8.0 09/25/2024    A1C 6.9 03/26/2024    A1C 7.1 08/15/2023    A1C 6.8 12/14/2022    A1C 7.2 04/25/2022    A1C 7.1 12/01/2021    A1C 7.2 12/15/2020    A1C 7.5 07/23/2020

## 2025-01-06 ENCOUNTER — OFFICE VISIT (OUTPATIENT)
Dept: FAMILY MEDICINE | Facility: CLINIC | Age: 35
End: 2025-01-06

## 2025-01-06 VITALS
WEIGHT: 266 LBS | DIASTOLIC BLOOD PRESSURE: 78 MMHG | SYSTOLIC BLOOD PRESSURE: 132 MMHG | HEART RATE: 71 BPM | TEMPERATURE: 98.3 F | BODY MASS INDEX: 39.57 KG/M2 | OXYGEN SATURATION: 96 %

## 2025-01-06 DIAGNOSIS — R68.83 CHILLS: Primary | ICD-10-CM

## 2025-01-06 DIAGNOSIS — R52 BODY ACHES: ICD-10-CM

## 2025-01-06 LAB
FLUAV AG UPPER RESP QL IA.RAPID: NORMAL
FLUBV AG UPPER RESP QL IA.RAPID: NORMAL

## 2025-01-06 PROCEDURE — 99213 OFFICE O/P EST LOW 20 MIN: CPT | Performed by: FAMILY MEDICINE

## 2025-01-06 PROCEDURE — 87804 INFLUENZA ASSAY W/OPTIC: CPT | Mod: 51 | Performed by: FAMILY MEDICINE

## 2025-01-06 PROCEDURE — G2211 COMPLEX E/M VISIT ADD ON: HCPCS | Performed by: FAMILY MEDICINE

## 2025-01-06 PROCEDURE — 87804 INFLUENZA ASSAY W/OPTIC: CPT | Performed by: FAMILY MEDICINE

## 2025-01-06 NOTE — PROGRESS NOTES
"Assessment & Plan   Problem List Items Addressed This Visit    None  Visit Diagnoses       Chills    -  Primary    Relevant Orders    Influenza A and B (BFP)    Body aches        Relevant Orders    Influenza A and B (BFP)             1. Chills (Primary)    - Influenza A and B (BFP)    2. Body aches  Viral uri, continue to treat symptoms.  - Influenza A and B (BFP)          BMI  Estimated body mass index is 39.57 kg/m  as calculated from the following:    Height as of 3/26/24: 1.746 m (5' 8.75\").    Weight as of this encounter: 120.7 kg (266 lb).         FUTURE APPOINTMENTS:       - Follow-up visit as needed. We manage his chronic medical care.    No follow-ups on file.    Yarelis Sevilla MD  [facility]    Subjective     Nursing Notes:   Milana Tinoco CMA  1/6/2025 11:41 AM  Signed  Chief Complaint   Patient presents with    URI     Symptoms started a few days ago with body aches, chills, lethargic, cough and sinus congestion, exposure to influenza at work, negative at home covid tests     Pre-visit Screening:  Immunizations:  up to date  Colonoscopy:  NA  Mammogram: NA  Asthma Action Test/Plan:  NA  PHQ9:  NA  GAD7:  NA  Questioned patient about current smoking habits Pt. has never smoked.  Ok to leave detailed message on voice mail for today's visit only Yes, phone # [phone]       Gonzalez Gibbs is a 34 year old male who presents to clinic today for the following health issues   HPI     Sx for a few days, wants to be checked for influenza, someone at work has influenza. He has chills, no fevers, breathing is ok. Has some facial congestion. Doesn't want to check for covid.          Review of Systems   Constitutional, HEENT, cardiovascular, pulmonary, gi and gu systems are negative, except as otherwise noted.      Objective    /78 (BP Location: Right arm, Patient Position: Sitting, Cuff Size: Adult Large)   Pulse 71   Temp 98.3  F (36.8  C) (Temporal)   Wt 120.7 kg (266 " lb)   SpO2 96%   BMI 39.57 kg/m    Body mass index is 39.57 kg/m .  Physical Exam   GENERAL: alert and no distress  RESP: lungs clear to auscultation - no rales, rhonchi or wheezes  CV: regular rate and rhythm, normal S1 S2, no S3 or S4, no murmur, click or rub, no peripheral edema  MS: no gross musculoskeletal defects noted, no edema  PSYCH: mentation appears normal, affect normal/bright    Results for orders placed or performed in visit on 01/06/25   Influenza A and B (BFP)     Status: None   Result Value Ref Range    Influenza A neg neg    Influenza B neg neg

## 2025-01-06 NOTE — LETTER
2025    Gonzalez Gibbs   1990        To Whom it May Concern;    Please excuse Gonzalez Gibbs from work/school for a healthcare visit on 2025.    Sincerely,        Yarelis Sevilla MD

## 2025-01-06 NOTE — NURSING NOTE
Chief Complaint   Patient presents with    URI     Symptoms started a few days ago with body aches, chills, lethargic, cough and sinus congestion, exposure to influenza at work, negative at home covid tests     Pre-visit Screening:  Immunizations:  up to date  Colonoscopy:  NA  Mammogram: NA  Asthma Action Test/Plan:  NA  PHQ9:  NA  GAD7:  NA  Questioned patient about current smoking habits Pt. has never smoked.  Ok to leave detailed message on voice mail for today's visit only Yes, phone # [phone]

## 2025-03-16 ENCOUNTER — MYC MEDICAL ADVICE (OUTPATIENT)
Dept: FAMILY MEDICINE | Facility: CLINIC | Age: 35
End: 2025-03-16

## 2025-03-16 DIAGNOSIS — F41.0 GENERALIZED ANXIETY DISORDER WITH PANIC ATTACKS: ICD-10-CM

## 2025-03-16 DIAGNOSIS — F41.1 GENERALIZED ANXIETY DISORDER WITH PANIC ATTACKS: ICD-10-CM

## 2025-03-17 RX ORDER — ESCITALOPRAM OXALATE 5 MG/1
TABLET ORAL
COMMUNITY
Start: 2025-03-17

## 2025-03-17 RX ORDER — ESCITALOPRAM OXALATE 10 MG/1
TABLET ORAL
COMMUNITY
Start: 2025-03-17

## 2025-03-17 NOTE — TELEPHONE ENCOUNTER
Pt is requesting refills of:  Refused Prescriptions:                       Disp   Refills    escitalopram (LEXAPRO) 5 MG tablet [Pharma*90 tab*1        Sig: TAKE 1 TABLET(5MG) BY MOUTH DAILY. TAKE WITH THE 10MG           FOR A TOTAL DAILY DOSE OF 15MG    escitalopram (LEXAPRO) 10 MG tablet [Pharm*90 tab*1        Sig: TAKE 1 TABLET(10MG) BY MOUTH DAILY. TAKE WITH 5MG FOR           A TOTAL DAILY DOSE OF 15MG    Pt was seen 9/25/24, advised to follow up in 6 months. Due for OV. Shae sent.

## 2025-03-23 RX ORDER — ESCITALOPRAM OXALATE 10 MG/1
10 TABLET ORAL DAILY
Qty: 90 TABLET | Refills: 0 | Status: SHIPPED | OUTPATIENT
Start: 2025-03-23

## 2025-03-23 RX ORDER — ESCITALOPRAM OXALATE 5 MG/1
5 TABLET ORAL DAILY
Qty: 90 TABLET | Refills: 0 | Status: SHIPPED | OUTPATIENT
Start: 2025-03-23

## 2025-04-12 ENCOUNTER — HEALTH MAINTENANCE LETTER (OUTPATIENT)
Age: 35
End: 2025-04-12

## 2025-05-30 NOTE — TELEPHONE ENCOUNTER
Addended by: EVELIA JUAREZ on: 5/30/2025 10:35 AM     Modules accepted: Orders     Talked to patient and will call back to make appointment

## 2025-06-03 ENCOUNTER — MYC MEDICAL ADVICE (OUTPATIENT)
Dept: FAMILY MEDICINE | Facility: CLINIC | Age: 35
End: 2025-06-03

## 2025-06-03 DIAGNOSIS — E10.9 TYPE 1 DIABETES MELLITUS WITHOUT COMPLICATION (H): ICD-10-CM

## 2025-06-03 DIAGNOSIS — I10 BENIGN ESSENTIAL HYPERTENSION: ICD-10-CM

## 2025-06-04 RX ORDER — LISINOPRIL AND HYDROCHLOROTHIAZIDE 12.5; 2 MG/1; MG/1
1 TABLET ORAL DAILY
Qty: 30 TABLET | Refills: 0 | Status: SHIPPED | OUTPATIENT
Start: 2025-06-04

## 2025-06-04 NOTE — TELEPHONE ENCOUNTER
Pt has OV scheduled on 7/3/25, needs ext of medication until then.     Pending Prescriptions:                       Disp   Refills    lisinopril-hydrochlorothiazide (ZESTORETI*30 tab*0            Sig: Take 1 tablet by mouth daily.    Routing to Verito GALLEGO to be sent in.

## 2025-06-14 ENCOUNTER — HEALTH MAINTENANCE LETTER (OUTPATIENT)
Age: 35
End: 2025-06-14

## 2025-06-22 ENCOUNTER — MYC MEDICAL ADVICE (OUTPATIENT)
Dept: FAMILY MEDICINE | Facility: CLINIC | Age: 35
End: 2025-06-22

## 2025-06-22 DIAGNOSIS — F41.0 GENERALIZED ANXIETY DISORDER WITH PANIC ATTACKS: ICD-10-CM

## 2025-06-22 DIAGNOSIS — F41.1 GENERALIZED ANXIETY DISORDER WITH PANIC ATTACKS: ICD-10-CM

## 2025-06-23 NOTE — TELEPHONE ENCOUNTER
Pt scheduled appt for 07/03 needs extension of his escitalopram.    Gonzalez Gibbs is requesting a refill of:    Pending Prescriptions:                       Disp   Refills    escitalopram (LEXAPRO) 5 MG tablet        14 tab*0            Sig: Take 1 tablet (5 mg) by mouth daily. With the           10mg

## 2025-06-24 RX ORDER — ESCITALOPRAM OXALATE 10 MG/1
10 TABLET ORAL DAILY
Qty: 14 TABLET | Refills: 0 | Status: SHIPPED | OUTPATIENT
Start: 2025-06-24

## 2025-06-24 RX ORDER — ESCITALOPRAM OXALATE 5 MG/1
5 TABLET ORAL DAILY
Qty: 14 TABLET | Refills: 0 | Status: SHIPPED | OUTPATIENT
Start: 2025-06-24

## 2025-06-29 ENCOUNTER — MYC MEDICAL ADVICE (OUTPATIENT)
Dept: FAMILY MEDICINE | Facility: CLINIC | Age: 35
End: 2025-06-29

## 2025-06-30 NOTE — TELEPHONE ENCOUNTER
Verito Molina PA-C please review patients MY CHART message below regarding new Endo providers.     Please advise     Thank you

## 2025-07-03 ENCOUNTER — OFFICE VISIT (OUTPATIENT)
Dept: FAMILY MEDICINE | Facility: CLINIC | Age: 35
End: 2025-07-03

## 2025-07-03 ENCOUNTER — RESULTS FOLLOW-UP (OUTPATIENT)
Dept: LAB | Facility: CLINIC | Age: 35
End: 2025-07-03
Payer: COMMERCIAL

## 2025-07-03 VITALS
SYSTOLIC BLOOD PRESSURE: 128 MMHG | DIASTOLIC BLOOD PRESSURE: 82 MMHG | HEIGHT: 69 IN | WEIGHT: 273.2 LBS | OXYGEN SATURATION: 97 % | BODY MASS INDEX: 40.46 KG/M2 | HEART RATE: 73 BPM | TEMPERATURE: 98 F

## 2025-07-03 DIAGNOSIS — Z00.00 ENCOUNTER FOR GENERAL MEDICAL EXAMINATION: Primary | ICD-10-CM

## 2025-07-03 DIAGNOSIS — E10.9 TYPE 1 DIABETES MELLITUS WITHOUT COMPLICATION (H): ICD-10-CM

## 2025-07-03 DIAGNOSIS — F41.0 GENERALIZED ANXIETY DISORDER WITH PANIC ATTACKS: ICD-10-CM

## 2025-07-03 DIAGNOSIS — F41.1 GENERALIZED ANXIETY DISORDER WITH PANIC ATTACKS: ICD-10-CM

## 2025-07-03 DIAGNOSIS — Z96.41 INSULIN PUMP IN PLACE: ICD-10-CM

## 2025-07-03 DIAGNOSIS — Z13.220 SCREENING FOR LIPID DISORDERS: ICD-10-CM

## 2025-07-03 DIAGNOSIS — I10 BENIGN ESSENTIAL HYPERTENSION: ICD-10-CM

## 2025-07-03 LAB
ALBUMIN (URINE) MG/L: 80
ALBUMIN SERPL-MCNC: 4.5 G/DL (ref 3.6–5.1)
ALBUMIN URINE MG/G CR: ABNORMAL MG/G CREATININE
ALP SERPL-CCNC: 52 U/L (ref 33–130)
ALT 1742-6: 26 U/L (ref 0–32)
AST 1920-8: 20 U/L (ref 0–35)
BILIRUB SERPL-MCNC: 0.9 MG/DL (ref 0.2–1.2)
BUN SERPL-MCNC: 10 MG/DL (ref 7–25)
BUN/CREATININE RATIO: 13 (ref 6–32)
CALCIUM SERPL-MCNC: 9.7 MG/DL (ref 8.6–10.3)
CHLORIDE SERPLBLD-SCNC: 101 MMOL/L (ref 98–110)
CHOLEST SERPL-MCNC: 230 MG/DL (ref 0–199)
CHOLEST/HDLC SERPL: 3 {RATIO} (ref 0–5)
CO2 SERPL-SCNC: 23.1 MMOL/L (ref 20–32)
CREAT SERPL-MCNC: 0.8 MG/DL (ref 0.6–1.3)
CREATININE URINE MG/DL: 200 MG/DL
GLUCOSE SERPL-MCNC: 152 MG/DL (ref 60–99)
HDLC SERPL-MCNC: 69 MG/DL (ref 40–150)
HEMOGLOBIN A1C: 7.7 % (ref 4–5.6)
LDLC SERPL CALC-MCNC: 144 MG/DL (ref 0–129)
POTASSIUM SERPL-SCNC: 4.21 MMOL/L (ref 3.5–5.3)
PROT SERPL-MCNC: 7.2 G/DL (ref 6.1–8.1)
SODIUM SERPL-SCNC: 135.6 MMOL/L (ref 135–146)
TRIGL SERPL-MCNC: 84 MG/DL (ref 0–149)

## 2025-07-03 PROCEDURE — 83036 HEMOGLOBIN GLYCOSYLATED A1C: CPT | Performed by: PHYSICIAN ASSISTANT

## 2025-07-03 PROCEDURE — 99395 PREV VISIT EST AGE 18-39: CPT | Performed by: PHYSICIAN ASSISTANT

## 2025-07-03 PROCEDURE — 36415 COLL VENOUS BLD VENIPUNCTURE: CPT | Performed by: PHYSICIAN ASSISTANT

## 2025-07-03 PROCEDURE — 82570 ASSAY OF URINE CREATININE: CPT | Performed by: PHYSICIAN ASSISTANT

## 2025-07-03 PROCEDURE — 99213 OFFICE O/P EST LOW 20 MIN: CPT | Mod: 25 | Performed by: PHYSICIAN ASSISTANT

## 2025-07-03 PROCEDURE — 82043 UR ALBUMIN QUANTITATIVE: CPT | Performed by: PHYSICIAN ASSISTANT

## 2025-07-03 PROCEDURE — 80061 LIPID PANEL: CPT | Performed by: PHYSICIAN ASSISTANT

## 2025-07-03 PROCEDURE — 80053 COMPREHEN METABOLIC PANEL: CPT | Performed by: PHYSICIAN ASSISTANT

## 2025-07-03 RX ORDER — LISINOPRIL AND HYDROCHLOROTHIAZIDE 12.5; 2 MG/1; MG/1
1 TABLET ORAL DAILY
Qty: 90 TABLET | Refills: 3 | Status: SHIPPED | OUTPATIENT
Start: 2025-07-03

## 2025-07-03 RX ORDER — ESCITALOPRAM OXALATE 10 MG/1
10 TABLET ORAL DAILY
Qty: 90 TABLET | Refills: 3 | Status: SHIPPED | OUTPATIENT
Start: 2025-07-03

## 2025-07-03 RX ORDER — ESCITALOPRAM OXALATE 5 MG/1
5 TABLET ORAL DAILY
Qty: 90 TABLET | Refills: 3 | Status: SHIPPED | OUTPATIENT
Start: 2025-07-03

## 2025-07-03 ASSESSMENT — ANXIETY QUESTIONNAIRES
GAD7 TOTAL SCORE: 7
1. FEELING NERVOUS, ANXIOUS, OR ON EDGE: SEVERAL DAYS
IF YOU CHECKED OFF ANY PROBLEMS ON THIS QUESTIONNAIRE, HOW DIFFICULT HAVE THESE PROBLEMS MADE IT FOR YOU TO DO YOUR WORK, TAKE CARE OF THINGS AT HOME, OR GET ALONG WITH OTHER PEOPLE: SOMEWHAT DIFFICULT
3. WORRYING TOO MUCH ABOUT DIFFERENT THINGS: SEVERAL DAYS
GAD7 TOTAL SCORE: 7
5. BEING SO RESTLESS THAT IT IS HARD TO SIT STILL: NOT AT ALL
2. NOT BEING ABLE TO STOP OR CONTROL WORRYING: SEVERAL DAYS
7. FEELING AFRAID AS IF SOMETHING AWFUL MIGHT HAPPEN: SEVERAL DAYS
6. BECOMING EASILY ANNOYED OR IRRITABLE: MORE THAN HALF THE DAYS

## 2025-07-03 ASSESSMENT — PATIENT HEALTH QUESTIONNAIRE - PHQ9
5. POOR APPETITE OR OVEREATING: SEVERAL DAYS
SUM OF ALL RESPONSES TO PHQ QUESTIONS 1-9: 5

## 2025-07-03 NOTE — NURSING NOTE
Chief Complaint   Patient presents with    Physical     CPX fasting med check and refill - has a concern about L foot      Pre-visit Screening:  Immunizations:  up to date  Colonoscopy:  na  Mammogram: na  Asthma Action Test/Plan:  na  PHQ9:  given  GAD7:  given  Questioned patient about current smoking habits Pt. has never smoked.  Ok to leave detailed message on voice mail for today's visit only yes, phone # [phone] (home)

## 2025-07-03 NOTE — PROGRESS NOTES
Gonzalez Gibbs is a 35 year old male presents for routine health maintenance.    Current concerns:   Needs new endocrinologist. Current endo is not within insurance network with new job.     Body mass index is 40.34 kg/m .    Present exercise habits:  Physical Job- nothing formal. May get rowing machine  Present dietary habits:  follows a balanced nutrition diet. Him and gf have been cleaning up diet past 2 weeks.     Vit D intake: is not taking supplement    Is the patient a smoker? No  If yes, smoking cessation advised and counseling provided.     Cardiovascular risk factors: diabetes mellitus and obesity    Over the past few weeks, have you felt down or depressed? Little interest or pleasure in doing things? Doing well see above.    Last dental appointment:  this year  Last optical appointment:  last year    Was the patient born between [birth year] and has not had Hep C testing?  No, not applicable    I have reviewed the following histories: Past Medical History, Past Surgical History, Social History, Family History, Problem List, Medication List and Allergies    Past Medical History:   Diagnosis Date    Family history of diabetes mellitus     Mild intermittent asthma     EIA    Mild intermittent asthma     EIA    Type I diabetes mellitus, uncontrolled 6/13/2016     Family History   Problem Relation Age of Onset    Depression Mother     Hypertension Father     Heart Disease Father         TAVR, pacemaker    Bipolar Disorder Brother     Dementia Paternal Grandmother     Heart Disease Paternal Grandfather     Diabetes Paternal Uncle     Diabetes Other         2nd cousins    Cancer No family hx of      Social History     Socioeconomic History    Marital status: Single     Spouse name: Not on file    Number of children: Not on file    Years of education: Not on file    Highest education level: Not on file   Occupational History    Occupation: [employer]   Tobacco Use    Smoking status: Never     Passive exposure:  Never    Smokeless tobacco: Never   Vaping Use    Vaping status: Never Used   Substance and Sexual Activity    Alcohol use: Yes     Alcohol/week: 0.0 standard drinks of alcohol    Drug use: No    Sexual activity: Yes     Partners: Female     Birth control/protection: Condom   Other Topics Concern     Service Not Asked    Blood Transfusions Not Asked    Caffeine Concern Not Asked    Occupational Exposure Not Asked    Hobby Hazards Not Asked    Sleep Concern Not Asked    Stress Concern Not Asked    Weight Concern Not Asked    Special Diet Not Asked    Back Care Not Asked    Exercise Yes    Bike Helmet Not Asked    Seat Belt Yes    Self-Exams Not Asked    Parent/sibling w/ CABG, MI or angioplasty before 65F 55M? Not Asked   Social History Narrative    Not on file     Social Drivers of Health     Financial Resource Strain: Not on file   Food Insecurity: Not on file   Transportation Needs: Not on file   Physical Activity: Not on file   Stress: Not on file   Social Connections: Not on file   Interpersonal Safety: Not on file   Housing Stability: Not on file     Patient Active Problem List   Diagnosis    Family history of diabetes mellitus    Fatty liver disease, nonalcoholic    Benign essential hypertension    Nonspecific abnormal results of liver function study/ monitor ALT    Type 1 diabetes mellitus with diabetic polyneuropathy (H)    ACP (advance care planning)    Morbid obesity (H)    Generalized anxiety disorder    Insulin pump in place     Current Outpatient Medications   Medication Sig Dispense Refill    blood glucose (NO BRAND SPECIFIED) test strip Use to test blood sugar 4 times daily or as directed. 400 strip 1    blood glucose monitoring (ACCU-CHEK FASTCLIX) lancets Use to test blood sugar 4 times daily or as directed. 300 each 1    Continuous Blood Gluc  (DEXCOM G6 ) REJI U UTD FOR CONTINUOUS GLUCOSE MONITORING. 1 each 1    Continuous Blood Gluc Sensor (DEXCOM G6 SENSOR) MISC CHANGE  "EVERY 10 DAYS AS DIRECTED 9 each 3    Continuous Glucose Transmitter (DEXCOM G6 TRANSMITTER) MISC CHANGE EVERY 90 DAYS 1 each 0    escitalopram (LEXAPRO) 10 MG tablet Take 1 tablet (10 mg) by mouth daily. With the 5mg 90 tablet 3    escitalopram (LEXAPRO) 5 MG tablet Take 1 tablet (5 mg) by mouth daily. With the 10mg 90 tablet 3    Glucagon (GVOKE HYPOPEN) 1 MG/0.2ML pen Inject the contents of 1 device under the skin into lower abdomen, outer thigh, or outer upper arm as needed for hypoglycemia. If no response after 15 minutes, additional 1 mg dose from a new device may be injected while waiting for emergency assistance. 1 each 0    insulin lispro (HUMALOG VIAL) 100 UNIT/ML vial INJECT UP  UNITS DAILY VIA PUMP. 140 mL 0    insulin syringe-needle U-100 (BD INSULIN SYRINGE) 29G X 1/2\" 1 ML miscellaneous USE AS DIRECTED FOR PUMP FAILURE. 400 each 0    lisinopril-hydrochlorothiazide (ZESTORETIC) 20-12.5 MG tablet Take 1 tablet by mouth daily. 90 tablet 3    metFORMIN (GLUCOPHAGE) 1000 MG tablet Take 1 tablet (1,000 mg) by mouth 2 times daily (with meals) 180 tablet 3    order for DME All diabetic supplies including test strips, lancets and solutions for testing for 3 months.  Please dispense glucometer compatible supplies- [employer]. Patient needs to check BG 4 times a day. 3 Month 3     No current facility-administered medications for this visit.       Allergies:    Allergies   Allergen Reactions    Sulfa Antibiotics Hives     rash       ROS:  E/M: NEGATIVE for ear, nose, mouth and throat problems  R: NEGATIVE for significant/chronic cough or SOB  CV: NEGATIVE for chest pain or palpitations  GI: NEGATIVE for abdominal pain, chronic diarrhea or constipation  : NEGATIVE for dysuria, hematuria, weakened urinary stream      OBJECTIVE:    Vitals:    07/03/25 1300   BP: 128/82   BP Location: Right arm   Patient Position: Sitting   Cuff Size: Adult Large   Pulse: 73   Temp: 98  F (36.7  C)   TempSrc: Temporal " "  SpO2: 97%   Weight: 123.9 kg (273 lb 3.2 oz)   Height: 1.753 m (5' 9\")       General: 35 year old male who appears his stated age. Vital signs noted  Head: Normocephalic  Eyes: pupils equal round reactive to light and accomodation, extra ocular movements intact  Ears: external canals and tms free of abnormalities  Nose: patent, without mucosal abnormalities  Mouth and throat: without erythema or lesions of the mucosa  Neck: supple, without adenopathy or thyromegaly  Lungs: clear to auscultation, no wheezing or crackles  Cv: regular rate and rhythm, normal s1 and s2 without murmur or click  Abd: soft, non-tender, no masses, no hepatomegaly or splenomegaly.  Gu: Declined no concerns.  Rectal: Declined no concerns.  Ms: normal muscle tone & symmetry  Skin: Clear to inspection  Neuro: sensation and motor function grossly intact; cranial nerves without obvious abnormalities.    ASSESSMENT/PLAN:    Encounter for general medical examination  Enrique is doing relatively well today. Will refill medication without change for 6 months. Will update fasting labs and send [facility] with results.    Generalized anxiety disorder with panic attacks  - escitalopram (LEXAPRO) 10 MG tablet; Take 1 tablet (10 mg) by mouth daily. With the 5mg  - escitalopram (LEXAPRO) 5 MG tablet; Take 1 tablet (5 mg) by mouth daily. With the 10mg    Benign essential hypertension  - lisinopril-hydrochlorothiazide (ZESTORETIC) 20-12.5 MG tablet; Take 1 tablet by mouth daily.  - Comprehensive Metobolic Panel (BFP)    Type 1 diabetes mellitus without complication (H)  Insulin pump in place  - lisinopril-hydrochlorothiazide (ZESTORETIC) 20-12.5 MG tablet; Take 1 tablet by mouth daily.  - HEMOGLOBIN A1C (BFP)  - VENOUS COLLECTION  - ALBUMIN RANDOM URINE QUANTITATIVE (BFP)  - [place] FOOT EXAM NO CHARGE    Screening for lipid disorders  - Lipid Panel (BFP)     reports that he has never smoked. He has never been exposed to tobacco smoke. He has never used smokeless " "tobacco.    Estimated body mass index is 40.34 kg/m  as calculated from the following:    Height as of this encounter: 1.753 m (5' 9\").    Weight as of this encounter: 123.9 kg (273 lb 3.2 oz).  Weight management plan: Discussed healthy diet and exercise guidelines      Labs pending:      Fasting glucose      Fasting lipids  Meds Suggested:      Vitamin D       Calcium  Tests Recommended:      Regular Dental Examinations        Eye exam        Mammogram yearly  Behavior Modifications:       Cardiovascular exercise 3 times per week--enough to get your Target Heart rate    The patient will return to the clinic if symptoms are changing or concern with follow up as discussed. The patient understands and agrees with the plan.      Verito Molina PA-C  7/3/2025        Counseling Resources:  ATP IV Guidelines  Pooled Cohorts Equation Calculator  Breast Cancer Risk Calculator  FRAX Risk Assessment  ICSI Preventive Guidelines  Dietary Guidelines for Americans, 2010  USDA's MyPlate    "

## 2025-08-03 ENCOUNTER — MYC MEDICAL ADVICE (OUTPATIENT)
Dept: FAMILY MEDICINE | Facility: CLINIC | Age: 35
End: 2025-08-03

## 2025-08-03 DIAGNOSIS — E10.9 TYPE 1 DIABETES MELLITUS WITHOUT COMPLICATION (H): ICD-10-CM

## 2025-08-06 RX ORDER — INSULIN LISPRO 100 [IU]/ML
INJECTION, SOLUTION INTRAVENOUS; SUBCUTANEOUS
Qty: 30 ML | Refills: 1 | Status: SHIPPED | OUTPATIENT
Start: 2025-08-06